# Patient Record
Sex: MALE | Race: BLACK OR AFRICAN AMERICAN
[De-identification: names, ages, dates, MRNs, and addresses within clinical notes are randomized per-mention and may not be internally consistent; named-entity substitution may affect disease eponyms.]

---

## 2017-02-14 ENCOUNTER — HOSPITAL ENCOUNTER (OUTPATIENT)
Dept: HOSPITAL 61 - SURG | Age: 67
Discharge: HOME | End: 2017-02-14
Attending: SURGERY
Payer: MEDICARE

## 2017-02-14 VITALS — WEIGHT: 254 LBS | BODY MASS INDEX: 36.77 KG/M2 | HEIGHT: 69.5 IN

## 2017-02-14 VITALS — SYSTOLIC BLOOD PRESSURE: 174 MMHG | DIASTOLIC BLOOD PRESSURE: 97 MMHG

## 2017-02-14 DIAGNOSIS — I10: ICD-10-CM

## 2017-02-14 DIAGNOSIS — J44.9: ICD-10-CM

## 2017-02-14 DIAGNOSIS — K42.0: ICD-10-CM

## 2017-02-14 DIAGNOSIS — N18.6: Primary | ICD-10-CM

## 2017-02-14 DIAGNOSIS — Z98.42: ICD-10-CM

## 2017-02-14 DIAGNOSIS — J45.909: ICD-10-CM

## 2017-02-14 DIAGNOSIS — E78.00: ICD-10-CM

## 2017-02-14 LAB
ALBUMIN SERPL-MCNC: 3.6 G/DL (ref 3.4–5)
ANION GAP SERPL CALC-SCNC: 11 MMOL/L (ref 6–14)
BASOPHILS # BLD AUTO: 0 X10^3/UL (ref 0–0.2)
BASOPHILS NFR BLD: 1 % (ref 0–3)
BUN SERPL-MCNC: 37 MG/DL (ref 8–26)
CALCIUM SERPL-MCNC: 9.3 MG/DL (ref 8.5–10.1)
CHLORIDE SERPL-SCNC: 103 MMOL/L (ref 98–107)
CO2 SERPL-SCNC: 28 MMOL/L (ref 21–32)
CREAT SERPL-MCNC: 4 MG/DL (ref 0.7–1.3)
EOSINOPHIL NFR BLD: 3 % (ref 0–3)
ERYTHROCYTE [DISTWIDTH] IN BLOOD BY AUTOMATED COUNT: 17.1 % (ref 11.5–14.5)
GFR SERPLBLD BASED ON 1.73 SQ M-ARVRAT: 18.2 ML/MIN
GLUCOSE SERPL-MCNC: 149 MG/DL (ref 70–99)
HCT VFR BLD CALC: 36.2 % (ref 39–53)
HGB BLD-MCNC: 11.7 G/DL (ref 13–17.5)
LYMPHOCYTES # BLD: 1.2 X10^3/UL (ref 1–4.8)
LYMPHOCYTES NFR BLD AUTO: 17 % (ref 24–48)
MCH RBC QN AUTO: 32 PG (ref 25–35)
MCHC RBC AUTO-ENTMCNC: 32 G/DL (ref 31–37)
MCV RBC AUTO: 100 FL (ref 79–100)
MONOCYTES NFR BLD: 9 % (ref 0–9)
NEUTROPHILS NFR BLD AUTO: 70 % (ref 31–73)
PLATELET # BLD AUTO: 181 X10^3/UL (ref 140–400)
POTASSIUM SERPL-SCNC: 4.2 MMOL/L (ref 3.5–5.1)
RBC # BLD AUTO: 3.64 X10^6/UL (ref 4.3–5.7)
SODIUM SERPL-SCNC: 142 MMOL/L (ref 136–145)
WBC # BLD AUTO: 7.1 X10^3/UL (ref 4–11)

## 2017-02-14 PROCEDURE — 85027 COMPLETE CBC AUTOMATED: CPT

## 2017-02-14 PROCEDURE — 49587: CPT

## 2017-02-14 PROCEDURE — 82040 ASSAY OF SERUM ALBUMIN: CPT

## 2017-02-14 PROCEDURE — 82947 ASSAY GLUCOSE BLOOD QUANT: CPT

## 2017-02-14 PROCEDURE — 36415 COLL VENOUS BLD VENIPUNCTURE: CPT

## 2017-02-14 PROCEDURE — 80048 BASIC METABOLIC PNL TOTAL CA: CPT

## 2017-02-14 PROCEDURE — C1769 GUIDE WIRE: HCPCS

## 2017-02-14 PROCEDURE — 49324 LAP INSERT TUNNEL IP CATH: CPT

## 2017-02-14 NOTE — DISCH
DISCHARGE INSTRUCTIONS


Condition on Discharge


Condition on Discharge:  Stable





Activity After Discharge


Activity Instructions for Disc:  Activity as tolerated, Avoid exertion


Lifting Instructions after Dis:  No heavy lifting


Driving Instructions after Dis:  Do not drive today





Diet after Discharge


Diet after Discharge:  Renal Dialysis





Wound Incision Care


Wound/Incision Care:  Ice to area for comfort, Keep wound/cast CDI





Follow-Up


Follow up with:  Gutierrez next week








EDI THOMASON MD Feb 14, 2017 09:58

## 2017-02-14 NOTE — OP
DATE OF SURGERY:  



PREOPERATIVE DIAGNOSIS:  End-stage renal disease on hemodialysis, requesting

peritoneal dialysis catheter incarcerated umbilical hernia.



POSTOPERATIVE DIAGNOSIS:   End-stage renal disease on hemodialysis, requesting

peritoneal dialysis catheter incarcerated umbilical hernia.



PROCEDURE:

1.  Laparoscopic placement of peritoneal dialysis catheter.

2.  Primary repair of incarcerated umbilical hernia.



SURGEON:  Royal Thomason MD



ANESTHESIA:  General endotracheal.



BLOOD LOSS:  5 mL.



IV FLUID:  250.



INDICATIONS:  The patient is a 66-year-old gentleman with end-stage renal

disease on hemodialysis through a temporary catheter who comes for placement of

peritoneal dialysis catheter.



OPERATIVE FINDINGS:  There was incarcerated preperitoneal fat in the umbilical

hernia.  Visual inspection of the remainder of the abdomen failed to reveal

obvious abnormalities.



OPERATIVE REPORT:  The patient brought to the operating suite, given a general

endotracheal anesthetic and the abdomen prepped and draped in usual sterile

fashion.  An epigastric incision was infiltrated with local anesthetic, sharply

incised and a 5 mm Visiport used to gain access into the abdominal cavity. 

Pneumoperitoneum established.  Inspection revealed no evidence of injury to

abdominal contents.  The umbilical hernia was identified and contained

preperitoneal fat.



The template for placement of the catheter had been used before insufflation to

henry at the course of the catheter.  The insertion site was infiltrated with

local anesthetic, sharply incised and under direct vision the needle passed in

the abdominal cavity.  The needle was removed and the dilators were passed

through the sheath and then the catheter was then threaded through the sheath. 

The distal Dacron cuff was seated just above the peritoneum.  The sheath was

then removed.



The remaining catheter was tunneled subcutaneously through the access site

placing the second Dacron cuff in the subcutaneous space away from the access

site incision.  We then turned our attention to the umbilical hernia.



An infraumbilical incision was infiltrated with local anesthetic, sharply

incised and dissection carried down to the anterior sheath.  The hernia was

encircled.  A Penrose drain placed around it and the umbilical skin carefully

freed from the underlying hernia sac and contents.  The contents were then

reduced and the small defect was repaired with interrupted inverted

figure-of-eight 0 Vicryl suture.  A second suture was placed as a second row of

reinforcement and good hemostasis was present.  When a correct sponge count was

obtained, the umbilical skin was tacked to the underlying repair.



The abdomen was then decompressed and the peritoneal dialysis catheter radially

accepted 500 mL of normal saline under a minute.  A similar amount was then

drained.  The catheter was "packed" with heparinized saline.  Skin incisions

closed with subcuticular 4-0 Monocryl.  Steri-Strips and sterile dressings

applied.  The patient was awakened from his anesthetic and taken to the recovery

room in satisfactory condition.

 



______________________________

ROYAL THOMASON MD



DR:  BRUNA/yuniel  JOB#:  [id number] / [id number]

DD:  02/14/2017 09:55  DT:  02/14/2017 11:40



LILA Gee MD

## 2017-02-14 NOTE — PDOC
BRIEF OPERATIVE NOTE


Date:  Feb 14, 2017


Pre-Op Diagnosis


ESRD, incarcerated umbilical hernia


Post-Op Diagnosis


same


Procedure Performed


l/s placement of PD catheter, primary repair of incarcerated umbilical hernia


Surgeon


Gutierrez


Anesthesia Type:  General


Blood Loss


5cc


IV Fluid


250cc


Findings


incarcerated fat in umbilical hernia


Complications


none


Additional Remarks


 # [id number]








EDI THOMASON MD Feb 14, 2017 09:57

## 2017-05-21 ENCOUNTER — HOSPITAL ENCOUNTER (INPATIENT)
Dept: HOSPITAL 61 - ER | Age: 67
LOS: 4 days | Discharge: HOME | DRG: 981 | End: 2017-05-25
Attending: FAMILY MEDICINE | Admitting: FAMILY MEDICINE
Payer: COMMERCIAL

## 2017-05-21 VITALS — SYSTOLIC BLOOD PRESSURE: 107 MMHG | DIASTOLIC BLOOD PRESSURE: 64 MMHG

## 2017-05-21 VITALS — BODY MASS INDEX: 36.68 KG/M2 | WEIGHT: 262 LBS | HEIGHT: 71 IN

## 2017-05-21 VITALS — DIASTOLIC BLOOD PRESSURE: 62 MMHG | SYSTOLIC BLOOD PRESSURE: 95 MMHG

## 2017-05-21 VITALS — SYSTOLIC BLOOD PRESSURE: 138 MMHG | DIASTOLIC BLOOD PRESSURE: 78 MMHG

## 2017-05-21 VITALS — DIASTOLIC BLOOD PRESSURE: 78 MMHG | SYSTOLIC BLOOD PRESSURE: 138 MMHG

## 2017-05-21 DIAGNOSIS — Z90.49: ICD-10-CM

## 2017-05-21 DIAGNOSIS — I25.10: ICD-10-CM

## 2017-05-21 DIAGNOSIS — J44.9: ICD-10-CM

## 2017-05-21 DIAGNOSIS — Z96.619: ICD-10-CM

## 2017-05-21 DIAGNOSIS — Z79.4: ICD-10-CM

## 2017-05-21 DIAGNOSIS — T85.611A: Primary | ICD-10-CM

## 2017-05-21 DIAGNOSIS — Z90.2: ICD-10-CM

## 2017-05-21 DIAGNOSIS — K21.9: ICD-10-CM

## 2017-05-21 DIAGNOSIS — E78.5: ICD-10-CM

## 2017-05-21 DIAGNOSIS — I89.0: ICD-10-CM

## 2017-05-21 DIAGNOSIS — K59.00: ICD-10-CM

## 2017-05-21 DIAGNOSIS — Z99.2: ICD-10-CM

## 2017-05-21 DIAGNOSIS — E11.22: ICD-10-CM

## 2017-05-21 DIAGNOSIS — I50.22: ICD-10-CM

## 2017-05-21 DIAGNOSIS — Z79.891: ICD-10-CM

## 2017-05-21 DIAGNOSIS — Z79.899: ICD-10-CM

## 2017-05-21 DIAGNOSIS — K66.0: ICD-10-CM

## 2017-05-21 DIAGNOSIS — Z79.82: ICD-10-CM

## 2017-05-21 DIAGNOSIS — Z95.1: ICD-10-CM

## 2017-05-21 DIAGNOSIS — I13.2: ICD-10-CM

## 2017-05-21 DIAGNOSIS — N18.6: ICD-10-CM

## 2017-05-21 DIAGNOSIS — Z87.891: ICD-10-CM

## 2017-05-21 DIAGNOSIS — K76.0: ICD-10-CM

## 2017-05-21 DIAGNOSIS — K65.8: ICD-10-CM

## 2017-05-21 DIAGNOSIS — B96.89: ICD-10-CM

## 2017-05-21 DIAGNOSIS — E21.3: ICD-10-CM

## 2017-05-21 DIAGNOSIS — D64.9: ICD-10-CM

## 2017-05-21 LAB
ALBUMIN SERPL-MCNC: 2.9 G/DL (ref 3.4–5)
ALBUMIN/GLOB SERPL: 0.6 {RATIO} (ref 1–1.7)
ALP SERPL-CCNC: 83 U/L (ref 46–116)
ALT SERPL-CCNC: 21 U/L (ref 16–63)
ANION GAP SERPL CALC-SCNC: 10 MMOL/L (ref 6–14)
APTT BLD: 31 SEC (ref 24–38)
AST SERPL-CCNC: 16 U/L (ref 15–37)
BASOPHILS # BLD AUTO: 0.1 X10^3/UL (ref 0–0.2)
BASOPHILS NFR BLD: 1 % (ref 0–3)
BILIRUB SERPL-MCNC: 0.6 MG/DL (ref 0.2–1)
BUN SERPL-MCNC: 63 MG/DL (ref 8–26)
BUN/CREAT SERPL: 10 (ref 6–20)
CALCIUM SERPL-MCNC: 8.8 MG/DL (ref 8.5–10.1)
CHLORIDE SERPL-SCNC: 100 MMOL/L (ref 98–107)
CK SERPL-CCNC: 145 U/L (ref 39–308)
CO2 SERPL-SCNC: 28 MMOL/L (ref 21–32)
CREAT SERPL-MCNC: 6.3 MG/DL (ref 0.7–1.3)
EOSINOPHIL NFR BLD: 1 % (ref 0–3)
ERYTHROCYTE [DISTWIDTH] IN BLOOD BY AUTOMATED COUNT: 15 % (ref 11.5–14.5)
GFR SERPLBLD BASED ON 1.73 SQ M-ARVRAT: 10.8 ML/MIN
GLOBULIN SER-MCNC: 4.9 G/DL (ref 2.2–3.8)
GLUCOSE SERPL-MCNC: 162 MG/DL (ref 70–99)
HCT VFR BLD CALC: 32.8 % (ref 39–53)
HGB BLD-MCNC: 10.8 G/DL (ref 13–17.5)
INR PPP: 1.1 (ref 0.8–1.1)
LYMPHOCYTES # BLD: 1 X10^3/UL (ref 1–4.8)
LYMPHOCYTES NFR BLD AUTO: 10 % (ref 24–48)
MAGNESIUM SERPL-MCNC: 2.2 MG/DL (ref 1.8–2.4)
MCH RBC QN AUTO: 32 PG (ref 25–35)
MCHC RBC AUTO-ENTMCNC: 33 G/DL (ref 31–37)
MCV RBC AUTO: 98 FL (ref 79–100)
MONOCYTES NFR BLD: 8 % (ref 0–9)
NEUTROPHILS NFR BLD AUTO: 81 % (ref 31–73)
PLATELET # BLD AUTO: 189 X10^3/UL (ref 140–400)
POTASSIUM SERPL-SCNC: 4.1 MMOL/L (ref 3.5–5.1)
PROT SERPL-MCNC: 7.8 G/DL (ref 6.4–8.2)
PROTHROMBIN TIME: 13.8 SEC (ref 11.7–14)
RBC # BLD AUTO: 3.34 X10^6/UL (ref 4.3–5.7)
SODIUM SERPL-SCNC: 138 MMOL/L (ref 136–145)
WBC # BLD AUTO: 10.3 X10^3/UL (ref 4–11)

## 2017-05-21 PROCEDURE — 83880 ASSAY OF NATRIURETIC PEPTIDE: CPT

## 2017-05-21 PROCEDURE — 85730 THROMBOPLASTIN TIME PARTIAL: CPT

## 2017-05-21 PROCEDURE — 85610 PROTHROMBIN TIME: CPT

## 2017-05-21 PROCEDURE — 85027 COMPLETE CBC AUTOMATED: CPT

## 2017-05-21 PROCEDURE — 96375 TX/PRO/DX INJ NEW DRUG ADDON: CPT

## 2017-05-21 PROCEDURE — 74176 CT ABD & PELVIS W/O CONTRAST: CPT

## 2017-05-21 PROCEDURE — C1713 ANCHOR/SCREW BN/BN,TIS/BN: HCPCS

## 2017-05-21 PROCEDURE — 77001 FLUOROGUIDE FOR VEIN DEVICE: CPT

## 2017-05-21 PROCEDURE — 80048 BASIC METABOLIC PNL TOTAL CA: CPT

## 2017-05-21 PROCEDURE — A4215 STERILE NEEDLE: HCPCS

## 2017-05-21 PROCEDURE — 96374 THER/PROPH/DIAG INJ IV PUSH: CPT

## 2017-05-21 PROCEDURE — 36415 COLL VENOUS BLD VENIPUNCTURE: CPT

## 2017-05-21 PROCEDURE — C1892 INTRO/SHEATH,FIXED,PEEL-AWAY: HCPCS

## 2017-05-21 PROCEDURE — S0028 INJECTION, FAMOTIDINE, 20 MG: HCPCS

## 2017-05-21 PROCEDURE — 49400 AIR INJECTION INTO ABDOMEN: CPT

## 2017-05-21 PROCEDURE — C1769 GUIDE WIRE: HCPCS

## 2017-05-21 PROCEDURE — C1750 CATH, HEMODIALYSIS,LONG-TERM: HCPCS

## 2017-05-21 PROCEDURE — 74000: CPT

## 2017-05-21 PROCEDURE — 83690 ASSAY OF LIPASE: CPT

## 2017-05-21 PROCEDURE — 83735 ASSAY OF MAGNESIUM: CPT

## 2017-05-21 PROCEDURE — 82947 ASSAY GLUCOSE BLOOD QUANT: CPT

## 2017-05-21 PROCEDURE — 96376 TX/PRO/DX INJ SAME DRUG ADON: CPT

## 2017-05-21 PROCEDURE — 85007 BL SMEAR W/DIFF WBC COUNT: CPT

## 2017-05-21 PROCEDURE — 87641 MR-STAPH DNA AMP PROBE: CPT

## 2017-05-21 PROCEDURE — 36558 INSERT TUNNELED CV CATH: CPT

## 2017-05-21 PROCEDURE — 74190 PERITONEOGRAM RS&I: CPT

## 2017-05-21 PROCEDURE — 76937 US GUIDE VASCULAR ACCESS: CPT

## 2017-05-21 PROCEDURE — 80053 COMPREHEN METABOLIC PANEL: CPT

## 2017-05-21 PROCEDURE — 82550 ASSAY OF CK (CPK): CPT

## 2017-05-21 RX ADMIN — OXYCODONE HYDROCHLORIDE AND ACETAMINOPHEN PRN TAB: 5; 325 TABLET ORAL at 16:42

## 2017-05-21 RX ADMIN — INSULIN ASPART SCH UNITS: 100 INJECTION, SOLUTION INTRAVENOUS; SUBCUTANEOUS at 17:06

## 2017-05-21 NOTE — RAD
Indication: Peritoneal dialysis catheter does not appear to be functioning

adequately. 



Technique:  KUB was obtained. Please see separate KUB report post

injection.



Findings: Peritoneal dialysis catheter is noted in the pelvis. There is a kink

at the pelvic inlet. Bowel gas pattern is nonobstructive. Vascular

calcifications are noted. There are degenerative changes in the spine.



Impression: Peritoneal dialysis catheter is coiled in the pelvis. There is a

mild kink at the pelvic inlet.

## 2017-05-21 NOTE — PHYS DOC
Past Medical History


Past Medical History:  CAD, CHF, COPD, Diabetes-Type II, Hypertension, Renal 

Disease, Renal Failure


Past Surgical History:  Coronary Bypass Surgery, Other


Additional Past Surgical Histo:  R middle and lower lobe lung removal, vein 

graft left thigh, SHUNT RT CHEST


Alcohol Use:  Sober


Drug Use:  None





Adult General


Chief Complaint


Chief Complaint:  ABDOMINAL PAIN





HPI


HPI





Patient is a 66  year old male presenting to the emergency department for 

evaluation of left lower quadrant abdominal pain that has been going on for the 

past several days. He also says that his peritoneal dialysis catheter has not 

been draining as it usually does.  He says that he has gained 7 pounds since 

his peritoneal dialysis stop working.  He was to follow with the dialysis team 

tomorrow however his pain was bothering him too much and he came to the 

emergency department for further evaluation.  Patient says that he has been 

quite constipated but denies any nausea vomiting diarrhea dysuria fevers chills 

shortness of breath or chest pain.





Review of Systems


Review of Systems





Constitutional: Denies fever or chills []


Eyes: Denies change in visual acuity, redness, or eye pain []


HENT: Denies nasal congestion or sore throat []


Respiratory: Denies cough or shortness of breath []


Cardiovascular: No additional information not addressed in HPI []


GI: + abdominal pain.  No nausea, vomiting, bloody stools or diarrhea []


: Denies dysuria or hematuria []


Musculoskeletal: Denies back pain or joint pain []


Integument: Denies rash or skin lesions []


Neurologic: Denies headache, focal weakness or sensory changes []





Current Medications


Current Medications





Current Medications








 Medications


  (Trade)  Dose


 Ordered  Sig/Anna  Start Time


 Stop Time Status Last Admin


Dose Admin


 


 Hydromorphone HCl


  (Dilaudid)  2 mg  STK-MED ONCE  5/21/17 13:14


 5/21/17 13:15 DC  


 


 


 Iohexol


  (Omnipaque 240


 Mg/ml)  50 ml  1X  ONCE  5/21/17 10:00


 5/21/17 10:09 DC 5/21/17 10:00


50 ML


 


 Iohexol


  (Omnipaque 300


 Mg/ml)  50 ml  1X  ONCE  5/21/17 10:00


 5/21/17 10:01 DC 5/21/17 10:15


25 ML


 


 Morphine Sulfate  5 mg  1X  ONCE  5/21/17 11:15


 5/21/17 11:16 DC 5/21/17 11:20


5 MG


 


 Ondansetron HCl


  (Zofran)  4 mg  1X  ONCE  5/21/17 12:00


 5/21/17 12:01 DC 5/21/17 12:02


4 MG











Allergies


Allergies





Allergies








Coded Allergies Type Severity Reaction Last Updated Verified


 


  No Known Drug Allergies    5/21/17 No











Physical Exam


Physical Exam





Constitutional: Well developed, well nourished, no acute distress, non-toxic 

appearance. []


HENT: Normocephalic, atraumatic, bilateral external ears normal, oropharynx 

moist, no oral exudates, nose normal. []


Eyes: PERRLA, EOMI, conjunctiva normal, no discharge. [] 


Neck: Normal range of motion, no tenderness, supple, no stridor. [] 


Cardiovascular:Heart rate regular rhythm, no murmur []


Lungs & Thorax:  Bilateral breath sounds clear to auscultation []


Abdomen: Bowel sounds normal, soft, + LLQ abdominal tenderness, no masses, no 

pulsatile masses. [] 


Skin: Warm, dry, no erythema, no rash. [] 


Back: No tenderness, no CVA tenderness. [] 


Extremities: No tenderness, no cyanosis, no clubbing, ROM intact, +2-3+ edema 

BL. [] 


Neurologic: Alert and oriented X 3, normal motor function, normal sensory 

function, no focal deficits noted. []





Current Patient Data


Vital Signs





 Vital Signs








  Date Time  Temp Pulse Resp B/P (MAP) Pulse Ox O2 Delivery O2 Flow Rate FiO2


 


5/21/17 13:19   20  95 Room Air  


 


5/21/17 09:16 98.8 79  110/63 (79)    





 98.8       








Lab Values





 Laboratory Tests








Test


  5/21/17


11:00


 


White Blood Count


  10.3 x10^3/uL


(4.0-11.0)


 


Red Blood Count


  3.34 x10^6/uL


(4.30-5.70)  L


 


Hemoglobin


  10.8 g/dL


(13.0-17.5)  L


 


Hematocrit


  32.8 %


(39.0-53.0)  L


 


Mean Corpuscular Volume


  98 fL ()


 


 


Mean Corpuscular Hemoglobin 32 pg (25-35)  


 


Mean Corpuscular Hemoglobin


Concent 33 g/dL


(31-37)


 


Red Cell Distribution Width


  15.0 %


(11.5-14.5)  H


 


Platelet Count


  189 x10^3/uL


(140-400)


 


Neutrophils (%) (Auto) 81 % (31-73)  H


 


Lymphocytes (%) (Auto) 10 % (24-48)  L


 


Monocytes (%) (Auto) 8 % (0-9)  


 


Eosinophils (%) (Auto) 1 % (0-3)  


 


Basophils (%) (Auto) 1 % (0-3)  


 


Neutrophils # (Auto)


  8.3 x10^3uL


(1.8-7.7)  H


 


Lymphocytes # (Auto)


  1.0 x10^3/uL


(1.0-4.8)


 


Monocytes # (Auto)


  0.8 x10^3/uL


(0.0-1.1)


 


Eosinophils # (Auto)


  0.1 x10^3/uL


(0.0-0.7)


 


Basophils # (Auto)


  0.1 x10^3/uL


(0.0-0.2)


 


Prothrombin Time


  13.8 SEC


(11.7-14.0)


 


Prothrombin Time INR 1.1 (0.8-1.1)  


 


PTT


  31 SEC (24-38)


 


 


Sodium Level


  138 mmol/L


(136-145)


 


Potassium Level


  4.1 mmol/L


(3.5-5.1)


 


Chloride Level


  100 mmol/L


()


 


Carbon Dioxide Level


  28 mmol/L


(21-32)


 


Anion Gap 10 (6-14)  


 


Blood Urea Nitrogen


  63 mg/dL


(8-26)  H


 


Creatinine


  6.3 mg/dL


(0.7-1.3)  H


 


Estimated GFR


(Cockcroft-Gault) 10.8  


 


 


BUN/Creatinine Ratio 10 (6-20)  


 


Glucose Level


  162 mg/dL


(70-99)  H


 


Calcium Level


  8.8 mg/dL


(8.5-10.1)


 


Magnesium Level


  2.2 mg/dL


(1.8-2.4)


 


Total Bilirubin


  0.6 mg/dL


(0.2-1.0)


 


Aspartate Amino Transferase


(AST) 16 U/L (15-37)


 


 


Alanine Aminotransferase (ALT)


  21 U/L (16-63)


 


 


Alkaline Phosphatase


  83 U/L


()


 


Creatine Kinase


  145 U/L


()


 


NT-Pro-B-Type Natriuretic


Peptide 1181 pg/mL


(0-124)  H


 


Total Protein


  7.8 g/dL


(6.4-8.2)


 


Albumin


  2.9 g/dL


(3.4-5.0)  L


 


Albumin/Globulin Ratio


  0.6 (1.0-1.7)


L


 


Lipase


  79 U/L


()





 Laboratory Tests


5/21/17 11:00








 Laboratory Tests


5/21/17 11:00











EKG


EKG


[]





Radiology/Procedures


Radiology/Procedures


Indication: Dialysis catheter does not appear to be functioning properly.





Technique: KUB was obtained and repeated to center over the pelvis after


injection of approximately 25 mL of iodinated contrast into the catheter


either by the radiology technologist or the ER clinician.





Findings: Contrast spills freely into the pelvis, catheter is patent. There


again is a mild kink where the catheter enters the pelvis.





Impression: Peritoneal dialysis catheter is patent.














DICTATED and SIGNED BY:     ELLIE KAYE MD


DATE:     05/21/17 1029





Indication: Left lower quadrant pain.





Technique: Axial images and coronal and sagittal reformatted images are


provided. Oral contrast was administered. The patient also had recent


injection of contrast into the peritoneal dialysis catheter. Recent KUBs along


with a CT from February 20, 2012 were reviewed in comparison.





One or more of the following individualized dose reduction techniques were


utilized for this examination:


1. Automated exposure control


2. Adjustment of the mA and/or kV according to patient size


3. Use of iterative reconstruction technique





Findings: There is linear atelectasis or scarring in the left lung base. There


is no pleural effusion. The heart is not enlarged. Coronary artery


calcifications are noted. Calcified granulomas are noted.





Solid organ evaluation is limited without contrast. There is mild fatty


infiltration of the liver. Gallbladder is unremarkable. Spleen, pancreas, and


adrenals are unremarkable. Probable cyst is noted in the right kidney


measuring 21 mm. There is atheromatous disease in the abdominal aorta without


aneurysm. Contrast in the esophagus may be related to reflux. There is no


small bowel obstruction or mural thickening. Moderate amount of stool is noted


in the colon. There are a few diverticula in the colon. Small amount of


ascites may be related to the dialysis catheter. Appendix is not definitely


visualized. Evaluation is limited. There is fluid in the paracolic gutter on


the right although again this could be related to the dialysis. Patient's


symptoms are on the left.





Peritoneal dialysis catheter is in position. Bladder is unremarkable. Prostate


is not enlarged. There are degenerative changes in the spine.





Impression:


1. No acute abdominal findings.


2. Fatty infiltration of the liver.


3. Peritoneal dialysis catheter.














DICTATED and SIGNED BY:     ELLIE KAYE MD


DATE:     05/21/17 5928





Course & Med Decision Making


Course & Med Decision Making


Catheter appears to be patent on the KUB. We will check labs CT his abdomen and 

pelvis to check for diverticulitis and then reassess.





Patient continues to have intractable abdominal pain in the emergency 

department.  Workup is largely negative but given his intractable pain and his 

failure of outpatient dialysis he will be admitted for further observation and 

treatment.  Patient admitted in stable condition.





Dragon Disclaimer


Dragon Disclaimer


This electronic medical record was generated, in whole or in part, using a 

voice recognition dictation system.





Departure


Departure


Impression:  


 Primary Impression:  


 Abdominal pain


 Additional Impressions:  


 ESRD (end stage renal disease) on dialysis


 Peripheral edema


 Fluid overload


Disposition:  09 ADMITTED AS INPATIENT


Admitting Physician:  Valentina Nuno


Condition:  IMPROVED


Referrals:  


VALENTINA NUNO MD (PCP)





Problem Qualifiers








 Primary Impression:  


 Abdominal pain


 Abdominal location:  left lower quadrant  Qualified Codes:  R10.32 - Left 

lower quadrant pain








MERISSA FAJARDO DO May 21, 2017 09:14

## 2017-05-21 NOTE — RAD
Indication: Left lower quadrant pain.



Technique: Axial images and coronal and sagittal reformatted images are

provided. Oral contrast was administered. The patient also had recent

injection of contrast into the peritoneal dialysis catheter. Recent KUBs along

with a CT from February 20, 2012 were reviewed in comparison.



One or more of the following individualized dose reduction techniques were

utilized for this examination:

1. Automated exposure control

2. Adjustment of the mA and/or kV according to patient size

3. Use of iterative reconstruction technique



Findings: There is linear atelectasis or scarring in the left lung base. There

is no pleural effusion. The heart is not enlarged. Coronary artery

calcifications are noted. Calcified granulomas are noted.



Solid organ evaluation is limited without contrast. There is mild fatty

infiltration of the liver. Gallbladder is unremarkable. Spleen, pancreas, and

adrenals are unremarkable. Probable cyst is noted in the right kidney

measuring 21 mm. There is atheromatous disease in the abdominal aorta without

aneurysm. Contrast in the esophagus may be related to reflux. There is no

small bowel obstruction or mural thickening. Moderate amount of stool is noted

in the colon. There are a few diverticula in the colon. Small amount of

ascites may be related to the dialysis catheter. Appendix is not definitely

visualized. Evaluation is limited. There is fluid in the paracolic gutter on

the right although again this could be related to the dialysis. Patient's

symptoms are on the left.



Peritoneal dialysis catheter is in position. Bladder is unremarkable. Prostate

is not enlarged. There are degenerative changes in the spine.



Impression:

1. No acute abdominal findings.

2. Fatty infiltration of the liver.

3. Peritoneal dialysis catheter.

## 2017-05-21 NOTE — RAD
Indication: Dialysis catheter does not appear to be functioning properly.



Technique: KUB was obtained and repeated to center over the pelvis after

injection of approximately 25 mL of iodinated contrast into the catheter

either by the radiology technologist or the ER clinician.



Findings: Contrast spills freely into the pelvis, catheter is patent. There

again is a mild kink where the catheter enters the pelvis.



Impression: Peritoneal dialysis catheter is patent.

## 2017-05-22 VITALS — DIASTOLIC BLOOD PRESSURE: 60 MMHG | SYSTOLIC BLOOD PRESSURE: 99 MMHG

## 2017-05-22 VITALS — SYSTOLIC BLOOD PRESSURE: 106 MMHG | DIASTOLIC BLOOD PRESSURE: 56 MMHG

## 2017-05-22 VITALS — DIASTOLIC BLOOD PRESSURE: 57 MMHG | SYSTOLIC BLOOD PRESSURE: 102 MMHG

## 2017-05-22 VITALS — DIASTOLIC BLOOD PRESSURE: 56 MMHG | SYSTOLIC BLOOD PRESSURE: 105 MMHG

## 2017-05-22 VITALS — DIASTOLIC BLOOD PRESSURE: 65 MMHG | SYSTOLIC BLOOD PRESSURE: 111 MMHG

## 2017-05-22 VITALS — SYSTOLIC BLOOD PRESSURE: 104 MMHG | DIASTOLIC BLOOD PRESSURE: 55 MMHG

## 2017-05-22 LAB
ANION GAP SERPL CALC-SCNC: 13 MMOL/L (ref 6–14)
BASOPHILS # BLD AUTO: 0 X10^3/UL (ref 0–0.2)
BASOPHILS NFR BLD: 0 % (ref 0–3)
BUN SERPL-MCNC: 67 MG/DL (ref 8–26)
CALCIUM SERPL-MCNC: 8.3 MG/DL (ref 8.5–10.1)
CHLORIDE SERPL-SCNC: 99 MMOL/L (ref 98–107)
CO2 SERPL-SCNC: 27 MMOL/L (ref 21–32)
CREAT SERPL-MCNC: 6.6 MG/DL (ref 0.7–1.3)
EOSINOPHIL NFR BLD AUTO: 1 % (ref 0–5)
EOSINOPHIL NFR BLD: 0 % (ref 0–3)
ERYTHROCYTE [DISTWIDTH] IN BLOOD BY AUTOMATED COUNT: 15.6 % (ref 11.5–14.5)
GFR SERPLBLD BASED ON 1.73 SQ M-ARVRAT: 10.2 ML/MIN
GLUCOSE SERPL-MCNC: 135 MG/DL (ref 70–99)
HCT VFR BLD CALC: 34 % (ref 39–53)
HGB BLD-MCNC: 11 G/DL (ref 13–17.5)
LYMPHOCYTES # BLD: 1.1 X10^3/UL (ref 1–4.8)
LYMPHOCYTES NFR BLD AUTO: 7 % (ref 24–48)
MCH RBC QN AUTO: 32 PG (ref 25–35)
MCHC RBC AUTO-ENTMCNC: 32 G/DL (ref 31–37)
MCV RBC AUTO: 99 FL (ref 79–100)
MONOCYTES NFR BLD: 4 % (ref 0–9)
NEUTROPHILS NFR BLD AUTO: 89 % (ref 31–73)
PLATELET # BLD AUTO: 222 X10^3/UL (ref 140–400)
PLATELET # BLD EST: ADEQUATE 10*3/UL
POTASSIUM SERPL-SCNC: 4.2 MMOL/L (ref 3.5–5.1)
RBC # BLD AUTO: 3.44 X10^6/UL (ref 4.3–5.7)
SODIUM SERPL-SCNC: 139 MMOL/L (ref 136–145)
WBC # BLD AUTO: 17.6 X10^3/UL (ref 4–11)

## 2017-05-22 RX ADMIN — OXYCODONE HYDROCHLORIDE AND ACETAMINOPHEN PRN TAB: 5; 325 TABLET ORAL at 03:30

## 2017-05-22 RX ADMIN — INSULIN ASPART SCH UNITS: 100 INJECTION, SOLUTION INTRAVENOUS; SUBCUTANEOUS at 17:00

## 2017-05-22 RX ADMIN — SEVELAMER CARBONATE SCH MG: 800 TABLET, FILM COATED ORAL at 12:41

## 2017-05-22 RX ADMIN — FUROSEMIDE SCH MG: 80 TABLET ORAL at 15:49

## 2017-05-22 RX ADMIN — FUROSEMIDE SCH MG: 80 TABLET ORAL at 10:07

## 2017-05-22 RX ADMIN — CALCITRIOL SCH MCG: 0.25 CAPSULE, LIQUID FILLED ORAL at 10:07

## 2017-05-22 RX ADMIN — POLYETHYLENE GLYCOL 3350 SCH GM: 17 POWDER, FOR SOLUTION ORAL at 08:11

## 2017-05-22 RX ADMIN — Medication SCH TAB: at 10:07

## 2017-05-22 RX ADMIN — OXYCODONE HYDROCHLORIDE AND ACETAMINOPHEN PRN TAB: 5; 325 TABLET ORAL at 10:56

## 2017-05-22 RX ADMIN — PIPERACILLIN SODIUM AND TAZOBACTAM SODIUM SCH MLS/HR: 2; .25 INJECTION, POWDER, LYOPHILIZED, FOR SOLUTION INTRAVENOUS at 20:27

## 2017-05-22 RX ADMIN — SEVELAMER CARBONATE SCH MG: 800 TABLET, FILM COATED ORAL at 10:07

## 2017-05-22 RX ADMIN — PIPERACILLIN SODIUM AND TAZOBACTAM SODIUM SCH MLS/HR: 2; .25 INJECTION, POWDER, LYOPHILIZED, FOR SOLUTION INTRAVENOUS at 10:00

## 2017-05-22 RX ADMIN — SIMVASTATIN SCH MG: 10 TABLET, FILM COATED ORAL at 20:27

## 2017-05-22 RX ADMIN — PIPERACILLIN SODIUM AND TAZOBACTAM SODIUM SCH MLS/HR: 2; .25 INJECTION, POWDER, LYOPHILIZED, FOR SOLUTION INTRAVENOUS at 17:02

## 2017-05-22 RX ADMIN — INSULIN ASPART SCH UNITS: 100 INJECTION, SOLUTION INTRAVENOUS; SUBCUTANEOUS at 08:00

## 2017-05-22 RX ADMIN — INSULIN ASPART SCH UNITS: 100 INJECTION, SOLUTION INTRAVENOUS; SUBCUTANEOUS at 12:00

## 2017-05-22 RX ADMIN — MORPHINE SULFATE PRN MG: 2 INJECTION, SOLUTION INTRAMUSCULAR; INTRAVENOUS at 15:49

## 2017-05-22 RX ADMIN — SEVELAMER CARBONATE SCH MG: 800 TABLET, FILM COATED ORAL at 17:02

## 2017-05-22 RX ADMIN — OXYCODONE HYDROCHLORIDE AND ACETAMINOPHEN PRN TAB: 5; 325 TABLET ORAL at 18:21

## 2017-05-22 RX ADMIN — MORPHINE SULFATE PRN MG: 4 INJECTION, SOLUTION INTRAMUSCULAR; INTRAVENOUS at 20:25

## 2017-05-22 RX ADMIN — PIPERACILLIN SODIUM AND TAZOBACTAM SODIUM SCH MLS/HR: 2; .25 INJECTION, POWDER, LYOPHILIZED, FOR SOLUTION INTRAVENOUS at 16:00

## 2017-05-22 NOTE — PDOC
Infectious Disease Note


Vital Sign


Vital Signs





Vital Signs








  Date Time  Temp Pulse Resp B/P (MAP) Pulse Ox O2 Delivery O2 Flow Rate FiO2


 


5/22/17 07:15     95 Room Air 2.0 


 


5/22/17 07:00 97.7 81 20 102/57 (72)    





 97.7       











Labs


Lab





Laboratory Tests








Test


  5/21/17


11:00 5/21/17


16:27 5/21/17


21:28 5/22/17


03:22


 


White Blood Count


  10.3 x10^3/uL


(4.0-11.0) 


  


  


 


 


Red Blood Count


  3.34 x10^6/uL


(4.30-5.70) 


  


  


 


 


Hemoglobin


  10.8 g/dL


(13.0-17.5) 


  


  


 


 


Hematocrit


  32.8 %


(39.0-53.0) 


  


  


 


 


Mean Corpuscular Volume 98 fL ()    


 


Mean Corpuscular Hemoglobin 32 pg (25-35)    


 


Mean Corpuscular Hemoglobin


Concent 33 g/dL


(31-37) 


  


  


 


 


Red Cell Distribution Width


  15.0 %


(11.5-14.5) 


  


  


 


 


Platelet Count


  189 x10^3/uL


(140-400) 


  


  


 


 


Neutrophils (%) (Auto) 81 % (31-73)    


 


Lymphocytes (%) (Auto) 10 % (24-48)    


 


Monocytes (%) (Auto) 8 % (0-9)    


 


Eosinophils (%) (Auto) 1 % (0-3)    


 


Basophils (%) (Auto) 1 % (0-3)    


 


Neutrophils # (Auto)


  8.3 x10^3uL


(1.8-7.7) 


  


  


 


 


Lymphocytes # (Auto)


  1.0 x10^3/uL


(1.0-4.8) 


  


  


 


 


Monocytes # (Auto)


  0.8 x10^3/uL


(0.0-1.1) 


  


  


 


 


Eosinophils # (Auto)


  0.1 x10^3/uL


(0.0-0.7) 


  


  


 


 


Basophils # (Auto)


  0.1 x10^3/uL


(0.0-0.2) 


  


  


 


 


Prothrombin Time


  13.8 SEC


(11.7-14.0) 


  


  


 


 


Prothromb Time International


Ratio 1.1 (0.8-1.1) 


  


  


  


 


 


Activated Partial


Thromboplast Time 31 SEC (24-38) 


  


  


  


 


 


Sodium Level


  138 mmol/L


(136-145) 


  


  139 mmol/L


(136-145)


 


Potassium Level


  4.1 mmol/L


(3.5-5.1) 


  


  4.2 mmol/L


(3.5-5.1)


 


Chloride Level


  100 mmol/L


() 


  


  99 mmol/L


()


 


Carbon Dioxide Level


  28 mmol/L


(21-32) 


  


  27 mmol/L


(21-32)


 


Anion Gap 10 (6-14)    13 (6-14) 


 


Blood Urea Nitrogen


  63 mg/dL


(8-26) 


  


  67 mg/dL


(8-26)


 


Creatinine


  6.3 mg/dL


(0.7-1.3) 


  


  6.6 mg/dL


(0.7-1.3)


 


Estimated GFR


(Cockcroft-Gault) 10.8 


  


  


  10.2 


 


 


BUN/Creatinine Ratio 10 (6-20)    


 


Glucose Level


  162 mg/dL


(70-99) 


  


  135 mg/dL


(70-99)


 


Calcium Level


  8.8 mg/dL


(8.5-10.1) 


  


  8.3 mg/dL


(8.5-10.1)


 


Magnesium Level


  2.2 mg/dL


(1.8-2.4) 


  


  


 


 


Total Bilirubin


  0.6 mg/dL


(0.2-1.0) 


  


  


 


 


Aspartate Amino Transf


(AST/SGOT) 16 U/L (15-37) 


  


  


  


 


 


Alanine Aminotransferase


(ALT/SGPT) 21 U/L (16-63) 


  


  


  


 


 


Alkaline Phosphatase


  83 U/L


() 


  


  


 


 


Creatine Kinase


  145 U/L


() 


  


  


 


 


NT-Pro-B-Type Natriuretic


Peptide 1181 pg/mL


(0-124) 


  


  


 


 


Total Protein


  7.8 g/dL


(6.4-8.2) 


  


  


 


 


Albumin


  2.9 g/dL


(3.4-5.0) 


  


  


 


 


Albumin/Globulin Ratio 0.6 (1.0-1.7)    


 


Lipase


  79 U/L


() 


  


  


 


 


Glucose (Fingerstick)


  


  114 mg/dL


(70-99) 144 mg/dL


(70-99) 


 


 


Test


  5/22/17


03:38 5/22/17


07:40 


  


 


 


White Blood Count


  17.6 x10^3/uL


(4.0-11.0) 


  


  


 


 


Red Blood Count


  3.44 x10^6/uL


(4.30-5.70) 


  


  


 


 


Hemoglobin


  11.0 g/dL


(13.0-17.5) 


  


  


 


 


Hematocrit


  34.0 %


(39.0-53.0) 


  


  


 


 


Mean Corpuscular Volume 99 fL ()    


 


Mean Corpuscular Hemoglobin 32 pg (25-35)    


 


Mean Corpuscular Hemoglobin


Concent 32 g/dL


(31-37) 


  


  


 


 


Red Cell Distribution Width


  15.6 %


(11.5-14.5) 


  


  


 


 


Platelet Count


  222 x10^3/uL


(140-400) 


  


  


 


 


Neutrophils (%) (Auto) 89 % (31-73)    


 


Lymphocytes (%) (Auto) 7 % (24-48)    


 


Monocytes (%) (Auto) 4 % (0-9)    


 


Eosinophils (%) (Auto) 0 % (0-3)    


 


Basophils (%) (Auto) 0 % (0-3)    


 


Neutrophils # (Auto)


  15.7 x10^3uL


(1.8-7.7) 


  


  


 


 


Lymphocytes # (Auto)


  1.1 x10^3/uL


(1.0-4.8) 


  


  


 


 


Monocytes # (Auto)


  0.8 x10^3/uL


(0.0-1.1) 


  


  


 


 


Eosinophils # (Auto)


  0.0 x10^3/uL


(0.0-0.7) 


  


  


 


 


Basophils # (Auto)


  0.0 x10^3/uL


(0.0-0.2) 


  


  


 


 


Segmented Neutrophils % 88 % (35-66)    


 


Band Neutrophils % 1 % (0-9)    


 


Lymphocytes % 8 % (24-48)    


 


Monocytes % 2 % (0-10)    


 


Eosinophils % 1 % (0-5)    


 


Platelet Estimate


  Adequate


(ADEQUATE) 


  


  


 


 


Glucose (Fingerstick)


  


  131 mg/dL


(70-99) 


  


 











Objective


Assessment


Abdominal pain


PD cath mal function vs infection/peritonitis


ESRD


DM


Chronic lymphedema


CHF





Plan


Plan of Care


PD fluid for cell count and culture


d/w Dr Whitley


once fluid taken then start zosyn


check culture


d/w SALOME Fisher MD May 22, 2017 09:37

## 2017-05-22 NOTE — CONS
DATE OF CONSULTATION:  05/22/2017



REQUESTING PHYSICIAN:  Dr. Valentina Delaney.



REASON FOR CONSULTATION:  Abdominal pain, possible peritonitis.



HISTORY OF PRESENT ILLNESS:  This is a 66-year-old -American gentleman

with history of congestive heart failure, end-stage renal disease, on peritoneal

dialysis for last 3 months or so, who came in with unable to do peritoneal

dialysis.  Last 4 days, he has not been able to get any fluid out and he is

having abdominal pain.  The patient also has been complaining of some

constipation.  The patient denies any fever, chills, nausea, vomiting, diarrhea.

 Denies any chest pain, headache, visual symptoms or urinary symptoms.



PAST MEDICAL HISTORY:  Positive for coronary artery disease, congestive heart

failure, COPD, diabetes, hypertension, end-stage renal disease on peritoneal

dialysis, has had coronary artery bypass surgery done as well as bilateral lower

extremity lymphedema.



SOCIAL HISTORY:  Negative for smoking, alcohol, illicit drug use.



ALLERGIES:  No known drug allergies.



CURRENT MEDICATIONS:  Reviewed.  The patient is not on any antibiotics.



REVIEW OF SYSTEMS:  As per HPI, all other systems reviewed are negative.



PHYSICAL EXAMINATION:

GENERAL:  Alert, oriented gentleman, not in distress.

VITAL SIGNS:  Stable, afebrile.

HEENT:  NAD.

NECK:  Supple, no JVP, no lymphadenopathy.

LUNGS:  Clear.

HEART:  S1, S2 regular.

ABDOMEN:  Not much distended, it is not very minimal or mild diffuse tenderness

present.  No rebound or guarding.  PD catheter site is unremarkable.

EXTREMITIES:  Bilateral chronic lymphedema, no signs of infection.

NEUROLOGIC:  The patient is neurologically intact.



LABORATORY DATA:  White count yesterday was 10,000, today is 17,000.  BUN 67,

creatinine 6.6.  PD fluid has not been obtained yet.



CT scan of the abdomen and pelvis was unremarkable.



IMPRESSION:

1.  Abdominal pain whether it is a mechanical failure of PD catheter versus PD

catheter associated peritonitis, needs to be ruled out.

2.  End-stage renal disease, on peritoneal dialysis.

3.  Leukocytosis.

4.  Diabetes.

5.  Coronary artery disease.

6.  Congestive heart failure.



RECOMMENDATIONS:  Would get PD fluid first for cell count, diff and culture and

then start Zosyn, supportive care.  Discussion with Dr. Whitley done and

consulted him and discussion with Dr. Delaney done.



Thank you very much, Dr. Delaney for giving me the opportunity to participate in

this patient's care.

 



______________________________

SALOME CRUZ MD



DR:  SULLY/yuniel  JOB#:  [id number] / [id number]

DD:  05/22/2017 09:43  DT:  05/22/2017 12:12

## 2017-05-22 NOTE — PDOC2
CONSULT


Date of Consult


Date of Consult


DATE: 5/22/17 


TIME: 11:28





Reason for Consult


Reason for Consult:


ESRD





Referring Physician


Referring Physician:


YAAKOV





Identification/Chief Complaint


Chief Complaint


ABD PAIN





Source


Source:  Chart review, Patient





History of Present Illness


Reason for Visit:


THIS IS A 66 YR OLD ESRD PT ON PD. HE HAS BEEN DOING WELL TILL THIS WEEKEND 

WHEN HE NOTICED PAIN WHILE ON HIS CYCLER AT NIGHT FOR DIALYSIS. HE HAS NOT HAD 

ANY FEVERS OR CHILLS. DUE TO AN INABILITY TO DO HIS PD AND ABD PAIN HE CAME TO 

THE ER AND THEN WAS ADMITTED TO THE HOSPITAL. PD CATHETER WAS EVALUATED AND 

THERE WAS NO OBSTRUCTION NOTED. HE IS NOTED TO HAVE LEUCOCYTOSIS. NO DIARRHEA. 

HAD SOME CONSTIPATION BUT THIS IS APPARENTLY RESOLVED





Past Medical History


Cardiovascular:  CAD, CHF, HTN, Hyperlipidemia, Other


Pulmonary:  Asthma, COPD


CENTRAL NERVOUS SYSTEM:  Other


GI:  GERD


Heme/Onc:  No pertinent hx


Hepatobiliary:  No pertinent hx


Psych:  No pertinent hx


Rheumatologic:  No pertinent hx


Infectious disease:  No pertinent hx


Renal/:  Chronic renal failure, Benign prostatic enlarg.


Endocrine:  Diabetes, Hyperparathyroidism





Past Surgical History


Past Surgical History:  Appendectomy, CABG, Other





Family History


Family History:  Diabetes, Hypertension, Family History Unknown





Social History


ALCOHOL:  none


Drugs:  None


Lives:  with Family


Domestic Violence:  Neg





Current Problem List


Problem List


Problems


Medical Problems:


(1) Abdominal pain


Status: Acute  





(2) ESRD (end stage renal disease) on dialysis


Status: Acute  





(3) Fluid overload


Status: Acute  





(4) Peripheral edema


Status: Acute  











Current Medications


Current Medications





Current Medications


Iohexol (Omnipaque 300 Mg/ml) 50 ml 1X  ONCE IJ  Last administered on 5/21/17at 

10:15;  Start 5/21/17 at 10:00;  Stop 5/21/17 at 10:01;  Status DC


Iohexol (Omnipaque 240 Mg/ml) 50 ml 1X  ONCE PO  Last administered on 5/21/17at 

10:00;  Start 5/21/17 at 10:00;  Stop 5/21/17 at 10:09;  Status DC


Ondansetron HCl (Zofran) 4 mg 1X  ONCE IV  Last administered on 5/21/17at 11:20

;  Start 5/21/17 at 11:15;  Stop 5/21/17 at 11:16;  Status DC


Morphine Sulfate 10 mg STK-MED ONCE .ROUTE ;  Start 5/21/17 at 11:08;  Stop 5/21 /17 at 11:09;  Status DC


Morphine Sulfate 5 mg 1X  ONCE IV  Last administered on 5/21/17at 11:20;  Start 

5/21/17 at 11:15;  Stop 5/21/17 at 11:16;  Status DC


Ondansetron HCl (Zofran) 4 mg 1X  ONCE IV  Last administered on 5/21/17at 12:02

;  Start 5/21/17 at 12:00;  Stop 5/21/17 at 12:01;  Status DC


Hydromorphone HCl (Dilaudid) 2 mg STK-MED ONCE .ROUTE ;  Start 5/21/17 at 13:14

;  Stop 5/21/17 at 13:15;  Status DC


Hydromorphone HCl (Dilaudid) 1 mg 1X  ONCE IV  Last administered on 5/21/17at 13

:19;  Start 5/21/17 at 13:30;  Stop 5/21/17 at 13:31;  Status DC


Ondansetron HCl (Zofran) 4 mg PRN Q8HRS  PRN IV NAUSEA/VOMITING;  Start 5/21/17 

at 14:30;  Stop 5/22/17 at 14:29


Fentanyl Citrate (Fentanyl 2ml Vial) 50 mcg PRN Q1HR  PRN IV PAIN;  Start 5/21/ 17 at 14:30;  Stop 5/22/17 at 14:29


Insulin Aspart (NovoLOG) 0-7 UNITS TIDWMEALS SQ ;  Start 5/21/17 at 17:00


Dextrose (Dextrose 50%-Water Syringe) 12.5 gm PRN Q15MIN  PRN IV SEE COMMENTS;  

Start 5/21/17 at 16:00


Oxycodone/ Acetaminophen (Percocet 5/325) 1 tab PRN Q6HRS  PRN PO PAIN Last 

administered on 5/22/17at 10:56;  Start 5/21/17 at 16:00


Polyethylene Glycol (miraLAX PACKET) 17 gm DAILY PO  Last administered on 5/22/ 17at 08:11;  Start 5/22/17 at 09:00


Bisacodyl (Dulcolax Tab) 10 mg PRN DAILY  PRN PO CONSTIPATION;  Start 5/21/17 

at 21:00


Vitamin B Complex/ Vitamin C (Winsome-Samir) 1 tab DAILY PO  Last administered on 5/ 22/17at 10:07;  Start 5/22/17 at 09:00


Furosemide (Lasix) 80 mg BID94 PO  Last administered on 5/22/17at 10:07;  Start 

5/22/17 at 09:00


Sevelamer Carbonate (Renvela) 400 mg TIDWMEALS PO  Last administered on 5/22/ 17at 10:07;  Start 5/22/17 at 09:00


Simvastatin (Zocor) 10 mg QHS PO ;  Start 5/22/17 at 21:00


Calcitriol (Rocaltrol) 0.5 mcg DAILY PO  Last administered on 5/22/17at 10:07;  

Start 5/22/17 at 09:00


Piperacillin Sod/ Tazobactam Sod 2.25 gm/Sodium Chloride 50 ml @  100 mls/hr 

Q8HRS IV ;  Start 5/22/17 at 10:00


Info (PHARMACY MONITORING -- do not chart) 4 each Q6HRS MC ;  Start 5/22/17 at 

12:00;  Status Cancel


Peritoneal Dialysis Solution 2,000 ml @  500 mls/hr Q4HRS IP ;  Start 5/22/17 

at 12:00





Active Scripts


Active


Calcitriol 0.5 Mcg Capsule 1 Cap PO DAILY


Reported


Nephro-Samir Tablet (Folic Acid/Vitamin B Comp W-C) 0.8 Mg Tablet 1 Tab PO DAILY


Lasix (Furosemide) 80 Mg Tablet 1 Tab PO BID


Simvastatin 10 Mg Tablet 1 Tab PO QHS


Renvela (Sevelamer Carbonate) 800 Mg Tablet 0.5 Tab PO TID


Percocet 5-325 Mg Tablet (Oxycodone/Acetaminophen) 1 Each Tablet 1 Tab PO Q4HRS 

PRN


     dose taken __________,  next dose may be taken at __________





Allergies


Allergies:  


Coded Allergies:  


     No Known Drug Allergies (Unverified , 5/21/17)





ROS


General:  YES: Fatigue, Malaise, Appetite


PSYCHOLOGICAL ROS:  YES: Anxiety


Eyes:  Yes Decreased vision


HEENT:  YES: Heacaches


Gastrointestinal:  Yes Abdominal Pain, Yes Constipation


Genitourinary:  YES Other (OLIGURIA)


Musculoskeletal:  Yes Muscular Weakness


Neurological:  Yes Weakness


Skin:  Yes Dry Skin





Physical Exam


General:  Alert, Oriented X3, Cooperative, No acute distress


HEENT:  Atraumatic, PERRLA, EOMI


Lungs:  Clear to auscultation


Heart:  Regular rate, Normal S1, Normal S2, No murmurs


Abdomen:  Normal bowel sounds, Soft, No hepatosplenomegaly, No masses, Other (

PD CATH IS CLEAN AND DRY. TENDER TO DEEP PALPATION BUT NO REBOUND PAIN NOTED)


Neuro:  Normal gait, Normal speech, Cranial nerves 3-12 NL


Psych/Mental Status:  Mental status NL, Mood NL


MUSCULOSKELETAL:  No deformity, No swelling





Vitals


VITALS





Vital Signs








  Date Time  Temp Pulse Resp B/P (MAP) Pulse Ox O2 Delivery O2 Flow Rate FiO2


 


5/22/17 10:56     96 Room Air 2.0 


 


5/22/17 10:54 97.8 93 20 104/55 (71)    





 97.8       











Labs


Labs





Laboratory Tests








Test


  5/21/17


11:00 5/21/17


16:27 5/21/17


21:28 5/22/17


03:22


 


White Blood Count


  10.3 x10^3/uL


(4.0-11.0) 


  


  


 


 


Red Blood Count


  3.34 x10^6/uL


(4.30-5.70) 


  


  


 


 


Hemoglobin


  10.8 g/dL


(13.0-17.5) 


  


  


 


 


Hematocrit


  32.8 %


(39.0-53.0) 


  


  


 


 


Mean Corpuscular Volume 98 fL ()    


 


Mean Corpuscular Hemoglobin 32 pg (25-35)    


 


Mean Corpuscular Hemoglobin


Concent 33 g/dL


(31-37) 


  


  


 


 


Red Cell Distribution Width


  15.0 %


(11.5-14.5) 


  


  


 


 


Platelet Count


  189 x10^3/uL


(140-400) 


  


  


 


 


Neutrophils (%) (Auto) 81 % (31-73)    


 


Lymphocytes (%) (Auto) 10 % (24-48)    


 


Monocytes (%) (Auto) 8 % (0-9)    


 


Eosinophils (%) (Auto) 1 % (0-3)    


 


Basophils (%) (Auto) 1 % (0-3)    


 


Neutrophils # (Auto)


  8.3 x10^3uL


(1.8-7.7) 


  


  


 


 


Lymphocytes # (Auto)


  1.0 x10^3/uL


(1.0-4.8) 


  


  


 


 


Monocytes # (Auto)


  0.8 x10^3/uL


(0.0-1.1) 


  


  


 


 


Eosinophils # (Auto)


  0.1 x10^3/uL


(0.0-0.7) 


  


  


 


 


Basophils # (Auto)


  0.1 x10^3/uL


(0.0-0.2) 


  


  


 


 


Prothrombin Time


  13.8 SEC


(11.7-14.0) 


  


  


 


 


Prothromb Time International


Ratio 1.1 (0.8-1.1) 


  


  


  


 


 


Activated Partial


Thromboplast Time 31 SEC (24-38) 


  


  


  


 


 


Sodium Level


  138 mmol/L


(136-145) 


  


  139 mmol/L


(136-145)


 


Potassium Level


  4.1 mmol/L


(3.5-5.1) 


  


  4.2 mmol/L


(3.5-5.1)


 


Chloride Level


  100 mmol/L


() 


  


  99 mmol/L


()


 


Carbon Dioxide Level


  28 mmol/L


(21-32) 


  


  27 mmol/L


(21-32)


 


Anion Gap 10 (6-14)    13 (6-14) 


 


Blood Urea Nitrogen


  63 mg/dL


(8-26) 


  


  67 mg/dL


(8-26)


 


Creatinine


  6.3 mg/dL


(0.7-1.3) 


  


  6.6 mg/dL


(0.7-1.3)


 


Estimated GFR


(Cockcroft-Gault) 10.8 


  


  


  10.2 


 


 


BUN/Creatinine Ratio 10 (6-20)    


 


Glucose Level


  162 mg/dL


(70-99) 


  


  135 mg/dL


(70-99)


 


Calcium Level


  8.8 mg/dL


(8.5-10.1) 


  


  8.3 mg/dL


(8.5-10.1)


 


Magnesium Level


  2.2 mg/dL


(1.8-2.4) 


  


  


 


 


Total Bilirubin


  0.6 mg/dL


(0.2-1.0) 


  


  


 


 


Aspartate Amino Transf


(AST/SGOT) 16 U/L (15-37) 


  


  


  


 


 


Alanine Aminotransferase


(ALT/SGPT) 21 U/L (16-63) 


  


  


  


 


 


Alkaline Phosphatase


  83 U/L


() 


  


  


 


 


Creatine Kinase


  145 U/L


() 


  


  


 


 


NT-Pro-B-Type Natriuretic


Peptide 1181 pg/mL


(0-124) 


  


  


 


 


Total Protein


  7.8 g/dL


(6.4-8.2) 


  


  


 


 


Albumin


  2.9 g/dL


(3.4-5.0) 


  


  


 


 


Albumin/Globulin Ratio 0.6 (1.0-1.7)    


 


Lipase


  79 U/L


() 


  


  


 


 


Glucose (Fingerstick)


  


  114 mg/dL


(70-99) 144 mg/dL


(70-99) 


 


 


Test


  5/22/17


03:38 5/22/17


07:40 5/22/17


10:33 


 


 


White Blood Count


  17.6 x10^3/uL


(4.0-11.0) 


  


  


 


 


Red Blood Count


  3.44 x10^6/uL


(4.30-5.70) 


  


  


 


 


Hemoglobin


  11.0 g/dL


(13.0-17.5) 


  


  


 


 


Hematocrit


  34.0 %


(39.0-53.0) 


  


  


 


 


Mean Corpuscular Volume 99 fL ()    


 


Mean Corpuscular Hemoglobin 32 pg (25-35)    


 


Mean Corpuscular Hemoglobin


Concent 32 g/dL


(31-37) 


  


  


 


 


Red Cell Distribution Width


  15.6 %


(11.5-14.5) 


  


  


 


 


Platelet Count


  222 x10^3/uL


(140-400) 


  


  


 


 


Neutrophils (%) (Auto) 89 % (31-73)    


 


Lymphocytes (%) (Auto) 7 % (24-48)    


 


Monocytes (%) (Auto) 4 % (0-9)    


 


Eosinophils (%) (Auto) 0 % (0-3)    


 


Basophils (%) (Auto) 0 % (0-3)    


 


Neutrophils # (Auto)


  15.7 x10^3uL


(1.8-7.7) 


  


  


 


 


Lymphocytes # (Auto)


  1.1 x10^3/uL


(1.0-4.8) 


  


  


 


 


Monocytes # (Auto)


  0.8 x10^3/uL


(0.0-1.1) 


  


  


 


 


Eosinophils # (Auto)


  0.0 x10^3/uL


(0.0-0.7) 


  


  


 


 


Basophils # (Auto)


  0.0 x10^3/uL


(0.0-0.2) 


  


  


 


 


Segmented Neutrophils % 88 % (35-66)    


 


Band Neutrophils % 1 % (0-9)    


 


Lymphocytes % 8 % (24-48)    


 


Monocytes % 2 % (0-10)    


 


Eosinophils % 1 % (0-5)    


 


Platelet Estimate


  Adequate


(ADEQUATE) 


  


  


 


 


Glucose (Fingerstick)


  


  131 mg/dL


(70-99) 156 mg/dL


(70-99) 


 








Laboratory Tests








Test


  5/21/17


16:27 5/21/17


21:28 5/22/17


03:22 5/22/17


03:38


 


Glucose (Fingerstick)


  114 mg/dL


(70-99) 144 mg/dL


(70-99) 


  


 


 


Sodium Level


  


  


  139 mmol/L


(136-145) 


 


 


Potassium Level


  


  


  4.2 mmol/L


(3.5-5.1) 


 


 


Chloride Level


  


  


  99 mmol/L


() 


 


 


Carbon Dioxide Level


  


  


  27 mmol/L


(21-32) 


 


 


Anion Gap   13 (6-14)  


 


Blood Urea Nitrogen


  


  


  67 mg/dL


(8-26) 


 


 


Creatinine


  


  


  6.6 mg/dL


(0.7-1.3) 


 


 


Estimated GFR


(Cockcroft-Gault) 


  


  10.2 


  


 


 


Glucose Level


  


  


  135 mg/dL


(70-99) 


 


 


Calcium Level


  


  


  8.3 mg/dL


(8.5-10.1) 


 


 


White Blood Count


  


  


  


  17.6 x10^3/uL


(4.0-11.0)


 


Red Blood Count


  


  


  


  3.44 x10^6/uL


(4.30-5.70)


 


Hemoglobin


  


  


  


  11.0 g/dL


(13.0-17.5)


 


Hematocrit


  


  


  


  34.0 %


(39.0-53.0)


 


Mean Corpuscular Volume    99 fL () 


 


Mean Corpuscular Hemoglobin    32 pg (25-35) 


 


Mean Corpuscular Hemoglobin


Concent 


  


  


  32 g/dL


(31-37)


 


Red Cell Distribution Width


  


  


  


  15.6 %


(11.5-14.5)


 


Platelet Count


  


  


  


  222 x10^3/uL


(140-400)


 


Neutrophils (%) (Auto)    89 % (31-73) 


 


Lymphocytes (%) (Auto)    7 % (24-48) 


 


Monocytes (%) (Auto)    4 % (0-9) 


 


Eosinophils (%) (Auto)    0 % (0-3) 


 


Basophils (%) (Auto)    0 % (0-3) 


 


Neutrophils # (Auto)


  


  


  


  15.7 x10^3uL


(1.8-7.7)


 


Lymphocytes # (Auto)


  


  


  


  1.1 x10^3/uL


(1.0-4.8)


 


Monocytes # (Auto)


  


  


  


  0.8 x10^3/uL


(0.0-1.1)


 


Eosinophils # (Auto)


  


  


  


  0.0 x10^3/uL


(0.0-0.7)


 


Basophils # (Auto)


  


  


  


  0.0 x10^3/uL


(0.0-0.2)


 


Segmented Neutrophils %    88 % (35-66) 


 


Band Neutrophils %    1 % (0-9) 


 


Lymphocytes %    8 % (24-48) 


 


Monocytes %    2 % (0-10) 


 


Eosinophils %    1 % (0-5) 


 


Platelet Estimate


  


  


  


  Adequate


(ADEQUATE)


 


Test


  5/22/17


07:40 5/22/17


10:33 


  


 


 


Glucose (Fingerstick)


  131 mg/dL


(70-99) 156 mg/dL


(70-99) 


  


 











Assessment/Plan


Assessment/Plan


IMP





ABD PAIN


PERIPHERAL LEUCOCYTOSIS


ANEMIA


ESRD-ON PD


DM II


HTN





PD CATHETER EVALUATED YESTERDAY SHOWED NO OBSTRUCTION





PLAN





PD FLUID CELL COUNT AND DIFF


ARANESP WHEN NEEDED


ANTIBIOTICS PER ID


RESUME PD - WILL DO CAPD


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


WILL DO EXCHANGES Q 6 HRS











LILA GONZALEZ MD May 22, 2017 11:33

## 2017-05-22 NOTE — PDOC2
CONSULT


Date of Consult


Date of Consult


DATE: 5/22/17 


TIME: 12:53





Reason for Consult


Reason for Consult:


pd cath dysfunction





Referring Physician


Referring Physician:


Dr Crook





Identification/Chief Complaint


Chief Complaint


abdominal pain





Source


Source:  Chart review, Patient





History of Present Illness


Reason for Visit:


PD cath placed in February by Dr Whitley, it has been functioning until 

Wednesday when nothing would flow and developed severe abdominal pain/cramping


denies n/v, no constipation 


tolerating his diet





Past Medical History


Cardiovascular:  CAD, CHF, HTN, Hyperlipidemia, Other


Pulmonary:  Asthma, COPD


CENTRAL NERVOUS SYSTEM:  Other


GI:  GERD


Heme/Onc:  No pertinent hx


Hepatobiliary:  No pertinent hx


Psych:  No pertinent hx


Rheumatologic:  No pertinent hx


Infectious disease:  No pertinent hx


Renal/:  Chronic renal failure, Benign prostatic enlarg.


Endocrine:  Diabetes, Hyperparathyroidism





Past Surgical History


Past Surgical History:  Appendectomy, CABG, Other (PD cath)





Family History


Family History:  Diabetes, Hypertension, Family History Unknown





Social History


ALCOHOL:  none


Drugs:  None


Lives:  with Family


Domestic Violence:  Neg





Current Problem List


Problem List


Problems


Medical Problems:


(1) Abdominal pain


Status: Acute  





(2) ESRD (end stage renal disease) on dialysis


Status: Acute  





(3) Fluid overload


Status: Acute  





(4) Peripheral edema


Status: Acute  











Current Medications


Current Medications





Current Medications


Iohexol (Omnipaque 300 Mg/ml) 50 ml 1X  ONCE IJ  Last administered on 5/21/17at 

10:15;  Start 5/21/17 at 10:00;  Stop 5/21/17 at 10:01;  Status DC


Iohexol (Omnipaque 240 Mg/ml) 50 ml 1X  ONCE PO  Last administered on 5/21/17at 

10:00;  Start 5/21/17 at 10:00;  Stop 5/21/17 at 10:09;  Status DC


Ondansetron HCl (Zofran) 4 mg 1X  ONCE IV  Last administered on 5/21/17at 11:20

;  Start 5/21/17 at 11:15;  Stop 5/21/17 at 11:16;  Status DC


Morphine Sulfate 10 mg STK-MED ONCE .ROUTE ;  Start 5/21/17 at 11:08;  Stop 5/21 /17 at 11:09;  Status DC


Morphine Sulfate 5 mg 1X  ONCE IV  Last administered on 5/21/17at 11:20;  Start 

5/21/17 at 11:15;  Stop 5/21/17 at 11:16;  Status DC


Ondansetron HCl (Zofran) 4 mg 1X  ONCE IV  Last administered on 5/21/17at 12:02

;  Start 5/21/17 at 12:00;  Stop 5/21/17 at 12:01;  Status DC


Hydromorphone HCl (Dilaudid) 2 mg STK-MED ONCE .ROUTE ;  Start 5/21/17 at 13:14

;  Stop 5/21/17 at 13:15;  Status DC


Hydromorphone HCl (Dilaudid) 1 mg 1X  ONCE IV  Last administered on 5/21/17at 13

:19;  Start 5/21/17 at 13:30;  Stop 5/21/17 at 13:31;  Status DC


Ondansetron HCl (Zofran) 4 mg PRN Q8HRS  PRN IV NAUSEA/VOMITING;  Start 5/21/17 

at 14:30;  Stop 5/22/17 at 14:29


Fentanyl Citrate (Fentanyl 2ml Vial) 50 mcg PRN Q1HR  PRN IV PAIN;  Start 5/21/ 17 at 14:30;  Stop 5/22/17 at 14:29


Insulin Aspart (NovoLOG) 0-7 UNITS TIDWMEALS SQ ;  Start 5/21/17 at 17:00


Dextrose (Dextrose 50%-Water Syringe) 12.5 gm PRN Q15MIN  PRN IV SEE COMMENTS;  

Start 5/21/17 at 16:00


Oxycodone/ Acetaminophen (Percocet 5/325) 1 tab PRN Q6HRS  PRN PO PAIN Last 

administered on 5/22/17at 10:56;  Start 5/21/17 at 16:00


Polyethylene Glycol (miraLAX PACKET) 17 gm DAILY PO  Last administered on 5/22/ 17at 08:11;  Start 5/22/17 at 09:00


Bisacodyl (Dulcolax Tab) 10 mg PRN DAILY  PRN PO CONSTIPATION;  Start 5/21/17 

at 21:00


Vitamin B Complex/ Vitamin C (Winsome-Samir) 1 tab DAILY PO  Last administered on 5/ 22/17at 10:07;  Start 5/22/17 at 09:00


Furosemide (Lasix) 80 mg BID94 PO  Last administered on 5/22/17at 10:07;  Start 

5/22/17 at 09:00


Sevelamer Carbonate (Renvela) 400 mg TIDWMEALS PO  Last administered on 5/22/ 17at 12:41;  Start 5/22/17 at 09:00


Simvastatin (Zocor) 10 mg QHS PO ;  Start 5/22/17 at 21:00


Calcitriol (Rocaltrol) 0.5 mcg DAILY PO  Last administered on 5/22/17at 10:07;  

Start 5/22/17 at 09:00


Piperacillin Sod/ Tazobactam Sod 2.25 gm/Sodium Chloride 50 ml @  100 mls/hr 

Q8HRS IV ;  Start 5/22/17 at 10:00


Info (PHARMACY MONITORING -- do not chart) 4 each Q6HRS MC ;  Start 5/22/17 at 

12:00;  Status Cancel


Peritoneal Dialysis Solution 2,000 ml @  500 mls/hr Q4HRS IP  Last administered 

on 5/22/17at 12:41;  Start 5/22/17 at 12:00





Active Scripts


Active


Calcitriol 0.5 Mcg Capsule 1 Cap PO DAILY


Reported


Nephro-Samir Tablet (Folic Acid/Vitamin B Comp W-C) 0.8 Mg Tablet 1 Tab PO DAILY


Lasix (Furosemide) 80 Mg Tablet 1 Tab PO BID


Simvastatin 10 Mg Tablet 1 Tab PO QHS


Renvela (Sevelamer Carbonate) 800 Mg Tablet 0.5 Tab PO TID


Percocet 5-325 Mg Tablet (Oxycodone/Acetaminophen) 1 Each Tablet 1 Tab PO Q4HRS 

PRN


     dose taken __________,  next dose may be taken at __________





Allergies


Allergies:  


Coded Allergies:  


     No Known Drug Allergies (Unverified , 5/21/17)





ROS


General:  YES: Chills, 


   No: Other (fevers)


PSYCHOLOGICAL ROS:  No: Anxiety, Depression


Eyes:  No Blurry vision, No Double vision


HEENT:  No: Heacaches, Sore Throat


Hematological and Lymphatic:  No: Bleeding Problems, Blood Clots


Respiratory:  No: Cough, Shortness of breath


Cardiovascular:  No Chest Pain, No Palpitations


Gastrointestinal:  Yes Other (see hpi)


Genitourinary:  YES Dysuria, 


   No Hematuria


Musculoskeletal:  No Joint Pain, No Muscle Pain


Neurological:  No Memory Loss, No Numbness/Tingling


Skin:  No Pruritus, No Rash





Physical Exam


General:  Alert, Oriented X3, Cooperative, No acute distress


HEENT:  PERRLA, Mucous membr. moist/pink


Lungs:  Clear to auscultation, Normal air movement


Heart:  Regular rate, Normal S1, Normal S2, No murmurs


Abdomen:  Soft, Other (moderated distention, nontender, PD cath in place)


Extremities:  No clubbing, No cyanosis


Skin:  No rashes, No breakdown


Neuro:  Normal gait, Normal speech


Psych/Mental Status:  Mental status NL, Mood NL


MUSCULOSKELETAL:  No deformity, No swelling





Vitals


VITALS





Vital Signs








  Date Time  Temp Pulse Resp B/P (MAP) Pulse Ox O2 Delivery O2 Flow Rate FiO2


 


5/22/17 10:56     96 Room Air 2.0 


 


5/22/17 10:54 97.8 93 20 104/55 (71)    





 97.8       











Labs


Labs





Laboratory Tests








Test


  5/21/17


11:00 5/21/17


16:27 5/21/17


21:28 5/22/17


03:22


 


White Blood Count


  10.3 x10^3/uL


(4.0-11.0) 


  


  


 


 


Red Blood Count


  3.34 x10^6/uL


(4.30-5.70) 


  


  


 


 


Hemoglobin


  10.8 g/dL


(13.0-17.5) 


  


  


 


 


Hematocrit


  32.8 %


(39.0-53.0) 


  


  


 


 


Mean Corpuscular Volume 98 fL ()    


 


Mean Corpuscular Hemoglobin 32 pg (25-35)    


 


Mean Corpuscular Hemoglobin


Concent 33 g/dL


(31-37) 


  


  


 


 


Red Cell Distribution Width


  15.0 %


(11.5-14.5) 


  


  


 


 


Platelet Count


  189 x10^3/uL


(140-400) 


  


  


 


 


Neutrophils (%) (Auto) 81 % (31-73)    


 


Lymphocytes (%) (Auto) 10 % (24-48)    


 


Monocytes (%) (Auto) 8 % (0-9)    


 


Eosinophils (%) (Auto) 1 % (0-3)    


 


Basophils (%) (Auto) 1 % (0-3)    


 


Neutrophils # (Auto)


  8.3 x10^3uL


(1.8-7.7) 


  


  


 


 


Lymphocytes # (Auto)


  1.0 x10^3/uL


(1.0-4.8) 


  


  


 


 


Monocytes # (Auto)


  0.8 x10^3/uL


(0.0-1.1) 


  


  


 


 


Eosinophils # (Auto)


  0.1 x10^3/uL


(0.0-0.7) 


  


  


 


 


Basophils # (Auto)


  0.1 x10^3/uL


(0.0-0.2) 


  


  


 


 


Prothrombin Time


  13.8 SEC


(11.7-14.0) 


  


  


 


 


Prothromb Time International


Ratio 1.1 (0.8-1.1) 


  


  


  


 


 


Activated Partial


Thromboplast Time 31 SEC (24-38) 


  


  


  


 


 


Sodium Level


  138 mmol/L


(136-145) 


  


  139 mmol/L


(136-145)


 


Potassium Level


  4.1 mmol/L


(3.5-5.1) 


  


  4.2 mmol/L


(3.5-5.1)


 


Chloride Level


  100 mmol/L


() 


  


  99 mmol/L


()


 


Carbon Dioxide Level


  28 mmol/L


(21-32) 


  


  27 mmol/L


(21-32)


 


Anion Gap 10 (6-14)    13 (6-14) 


 


Blood Urea Nitrogen


  63 mg/dL


(8-26) 


  


  67 mg/dL


(8-26)


 


Creatinine


  6.3 mg/dL


(0.7-1.3) 


  


  6.6 mg/dL


(0.7-1.3)


 


Estimated GFR


(Cockcroft-Gault) 10.8 


  


  


  10.2 


 


 


BUN/Creatinine Ratio 10 (6-20)    


 


Glucose Level


  162 mg/dL


(70-99) 


  


  135 mg/dL


(70-99)


 


Calcium Level


  8.8 mg/dL


(8.5-10.1) 


  


  8.3 mg/dL


(8.5-10.1)


 


Magnesium Level


  2.2 mg/dL


(1.8-2.4) 


  


  


 


 


Total Bilirubin


  0.6 mg/dL


(0.2-1.0) 


  


  


 


 


Aspartate Amino Transf


(AST/SGOT) 16 U/L (15-37) 


  


  


  


 


 


Alanine Aminotransferase


(ALT/SGPT) 21 U/L (16-63) 


  


  


  


 


 


Alkaline Phosphatase


  83 U/L


() 


  


  


 


 


Creatine Kinase


  145 U/L


() 


  


  


 


 


NT-Pro-B-Type Natriuretic


Peptide 1181 pg/mL


(0-124) 


  


  


 


 


Total Protein


  7.8 g/dL


(6.4-8.2) 


  


  


 


 


Albumin


  2.9 g/dL


(3.4-5.0) 


  


  


 


 


Albumin/Globulin Ratio 0.6 (1.0-1.7)    


 


Lipase


  79 U/L


() 


  


  


 


 


Glucose (Fingerstick)


  


  114 mg/dL


(70-99) 144 mg/dL


(70-99) 


 


 


Test


  5/22/17


03:38 5/22/17


07:40 5/22/17


10:33 


 


 


White Blood Count


  17.6 x10^3/uL


(4.0-11.0) 


  


  


 


 


Red Blood Count


  3.44 x10^6/uL


(4.30-5.70) 


  


  


 


 


Hemoglobin


  11.0 g/dL


(13.0-17.5) 


  


  


 


 


Hematocrit


  34.0 %


(39.0-53.0) 


  


  


 


 


Mean Corpuscular Volume 99 fL ()    


 


Mean Corpuscular Hemoglobin 32 pg (25-35)    


 


Mean Corpuscular Hemoglobin


Concent 32 g/dL


(31-37) 


  


  


 


 


Red Cell Distribution Width


  15.6 %


(11.5-14.5) 


  


  


 


 


Platelet Count


  222 x10^3/uL


(140-400) 


  


  


 


 


Neutrophils (%) (Auto) 89 % (31-73)    


 


Lymphocytes (%) (Auto) 7 % (24-48)    


 


Monocytes (%) (Auto) 4 % (0-9)    


 


Eosinophils (%) (Auto) 0 % (0-3)    


 


Basophils (%) (Auto) 0 % (0-3)    


 


Neutrophils # (Auto)


  15.7 x10^3uL


(1.8-7.7) 


  


  


 


 


Lymphocytes # (Auto)


  1.1 x10^3/uL


(1.0-4.8) 


  


  


 


 


Monocytes # (Auto)


  0.8 x10^3/uL


(0.0-1.1) 


  


  


 


 


Eosinophils # (Auto)


  0.0 x10^3/uL


(0.0-0.7) 


  


  


 


 


Basophils # (Auto)


  0.0 x10^3/uL


(0.0-0.2) 


  


  


 


 


Segmented Neutrophils % 88 % (35-66)    


 


Band Neutrophils % 1 % (0-9)    


 


Lymphocytes % 8 % (24-48)    


 


Monocytes % 2 % (0-10)    


 


Eosinophils % 1 % (0-5)    


 


Platelet Estimate


  Adequate


(ADEQUATE) 


  


  


 


 


Glucose (Fingerstick)


  


  131 mg/dL


(70-99) 156 mg/dL


(70-99) 


 








Laboratory Tests








Test


  5/21/17


16:27 5/21/17


21:28 5/22/17


03:22 5/22/17


03:38


 


Glucose (Fingerstick)


  114 mg/dL


(70-99) 144 mg/dL


(70-99) 


  


 


 


Sodium Level


  


  


  139 mmol/L


(136-145) 


 


 


Potassium Level


  


  


  4.2 mmol/L


(3.5-5.1) 


 


 


Chloride Level


  


  


  99 mmol/L


() 


 


 


Carbon Dioxide Level


  


  


  27 mmol/L


(21-32) 


 


 


Anion Gap   13 (6-14)  


 


Blood Urea Nitrogen


  


  


  67 mg/dL


(8-26) 


 


 


Creatinine


  


  


  6.6 mg/dL


(0.7-1.3) 


 


 


Estimated GFR


(Cockcroft-Gault) 


  


  10.2 


  


 


 


Glucose Level


  


  


  135 mg/dL


(70-99) 


 


 


Calcium Level


  


  


  8.3 mg/dL


(8.5-10.1) 


 


 


White Blood Count


  


  


  


  17.6 x10^3/uL


(4.0-11.0)


 


Red Blood Count


  


  


  


  3.44 x10^6/uL


(4.30-5.70)


 


Hemoglobin


  


  


  


  11.0 g/dL


(13.0-17.5)


 


Hematocrit


  


  


  


  34.0 %


(39.0-53.0)


 


Mean Corpuscular Volume    99 fL () 


 


Mean Corpuscular Hemoglobin    32 pg (25-35) 


 


Mean Corpuscular Hemoglobin


Concent 


  


  


  32 g/dL


(31-37)


 


Red Cell Distribution Width


  


  


  


  15.6 %


(11.5-14.5)


 


Platelet Count


  


  


  


  222 x10^3/uL


(140-400)


 


Neutrophils (%) (Auto)    89 % (31-73) 


 


Lymphocytes (%) (Auto)    7 % (24-48) 


 


Monocytes (%) (Auto)    4 % (0-9) 


 


Eosinophils (%) (Auto)    0 % (0-3) 


 


Basophils (%) (Auto)    0 % (0-3) 


 


Neutrophils # (Auto)


  


  


  


  15.7 x10^3uL


(1.8-7.7)


 


Lymphocytes # (Auto)


  


  


  


  1.1 x10^3/uL


(1.0-4.8)


 


Monocytes # (Auto)


  


  


  


  0.8 x10^3/uL


(0.0-1.1)


 


Eosinophils # (Auto)


  


  


  


  0.0 x10^3/uL


(0.0-0.7)


 


Basophils # (Auto)


  


  


  


  0.0 x10^3/uL


(0.0-0.2)


 


Segmented Neutrophils %    88 % (35-66) 


 


Band Neutrophils %    1 % (0-9) 


 


Lymphocytes %    8 % (24-48) 


 


Monocytes %    2 % (0-10) 


 


Eosinophils %    1 % (0-5) 


 


Platelet Estimate


  


  


  


  Adequate


(ADEQUATE)


 


Test


  5/22/17


07:40 5/22/17


10:33 


  


 


 


Glucose (Fingerstick)


  131 mg/dL


(70-99) 156 mg/dL


(70-99) 


  


 











Assessment/Plan


Assessment/Plan


PD cath malfunction 


renal failure, requiring dialysis


leukocytosis


abd pain





will consult IR to assess PD cath











TORIN VALDEZ May 22, 2017 13:05

## 2017-05-22 NOTE — PDOC
Exam








Chris





Assistant


Assistant


JOSE DANIEL Bermudez





Pre-Procedure Diagnosis


Pre-Procedure Diagnosis


Nonfunctioning PDC.


Severe LLQ pain.





Post-Procedure Diagnosis


Post-Procedure Diagnosis


Unable to restore PDC function---Attempted injection of 2 cc contrast material 

met with high resistance, and produced acute exacerbation of LLQ pain, with 

radiation to left testicle.





Procedure Performed


Procedure Performed


PDC check





Type of Anesthesia


Type of Anesthesia


None





Estimated Blood Loss


EBL:


None





Condition of Patient


Condition of Patient


No change.  Persistent, severe  LLQ pain.





Disposition


Disposition


From IR return to [facility].  


Unable to restore PDC function---do not attempt to use PDC.


General surgeon notified.


Full report to follow.











ROSA BAUTISTA MD May 22, 2017 15:23

## 2017-05-22 NOTE — PDOC
PROGRESS NOTES


Subjective


Subjective


Patient reports some L LQ abdominal pain persists, not as bad as at admission.





Objective


Objective





Vital Signs








  Date Time  Temp Pulse Resp B/P (MAP) Pulse Ox O2 Delivery O2 Flow Rate FiO2


 


5/22/17 07:15     95 Room Air 2.0 


 


5/22/17 07:00 97.7 81 20 102/57 (72)    





 97.7       














Intake and Output 


 


 5/22/17





 06:59


 


Intake Total 120 ml


 


Output Total 925 ml


 


Balance -805 ml


 


 


 


Intake Oral 120 ml


 


Output Urine Total 925 ml


 


# Voids 3











Physical Exam


Abdomen:  Normal bowel sounds, Soft, Other (mild TTP L LQ and diffusely, no 

guarding or rebound, no erythema around dialysis catheter site)


Heart:  Regular rate


Extremities:  Other (chronic severe lymphedema bilateral LE's)


General:  Alert, Oriented X3, No acute distress


Lungs:  Other (BS mildly decreased throughout but otherwise CTA)





Assessment


Assessment


Problems


Medical Problems:


(1) Abdominal pain


Status: Acute  





(2) ESRD (end stage renal disease) on dialysis


Status: Acute  





(3) Fluid overload


Status: Acute  





(4) Peripheral edema


Status: Acute  











Plan


Plan of Care


1. ESRD - patient unable to do his usual peritoneal dialysis at home for 

several days as machine indicated line was clogged and wouldn't draw. KUB at 

admission showed contrast flowed through catheter without difficulty but 

partial kink in tubing seen. Awaiting Renal input.


2. abdominal pain - this is unusual for patient. Not constipated as he has been 

taking Miralax at home every day as advised. WBC's increased today raising 

possibility of SBP. CT at admission showed no evidence of localized infection. 

Consulting ID.


3. Hx mild systolic CHF - appears stable, continue patient's usual Lasix BID.


4. DM2 - this has mainly been diet-controlled for him. Continue ADA diet and SS 

insulin.


5. chronic lymphedema - patient unfortunately at his baseline. Knows to elevate 

his legs as much as possible.





Comment


Review of Relevant


I have reviewed the following items henry (where applicable) has been applied.


Labs





Laboratory Tests








Test


  5/21/17


11:00 5/21/17


16:27 5/21/17


21:28 5/22/17


03:22


 


White Blood Count


  10.3 x10^3/uL


(4.0-11.0) 


  


  


 


 


Red Blood Count


  3.34 x10^6/uL


(4.30-5.70) 


  


  


 


 


Hemoglobin


  10.8 g/dL


(13.0-17.5) 


  


  


 


 


Hematocrit


  32.8 %


(39.0-53.0) 


  


  


 


 


Mean Corpuscular Volume 98 fL ()    


 


Mean Corpuscular Hemoglobin 32 pg (25-35)    


 


Mean Corpuscular Hemoglobin


Concent 33 g/dL


(31-37) 


  


  


 


 


Red Cell Distribution Width


  15.0 %


(11.5-14.5) 


  


  


 


 


Platelet Count


  189 x10^3/uL


(140-400) 


  


  


 


 


Neutrophils (%) (Auto) 81 % (31-73)    


 


Lymphocytes (%) (Auto) 10 % (24-48)    


 


Monocytes (%) (Auto) 8 % (0-9)    


 


Eosinophils (%) (Auto) 1 % (0-3)    


 


Basophils (%) (Auto) 1 % (0-3)    


 


Neutrophils # (Auto)


  8.3 x10^3uL


(1.8-7.7) 


  


  


 


 


Lymphocytes # (Auto)


  1.0 x10^3/uL


(1.0-4.8) 


  


  


 


 


Monocytes # (Auto)


  0.8 x10^3/uL


(0.0-1.1) 


  


  


 


 


Eosinophils # (Auto)


  0.1 x10^3/uL


(0.0-0.7) 


  


  


 


 


Basophils # (Auto)


  0.1 x10^3/uL


(0.0-0.2) 


  


  


 


 


Prothrombin Time


  13.8 SEC


(11.7-14.0) 


  


  


 


 


Prothromb Time International


Ratio 1.1 (0.8-1.1) 


  


  


  


 


 


Activated Partial


Thromboplast Time 31 SEC (24-38) 


  


  


  


 


 


Sodium Level


  138 mmol/L


(136-145) 


  


  139 mmol/L


(136-145)


 


Potassium Level


  4.1 mmol/L


(3.5-5.1) 


  


  4.2 mmol/L


(3.5-5.1)


 


Chloride Level


  100 mmol/L


() 


  


  99 mmol/L


()


 


Carbon Dioxide Level


  28 mmol/L


(21-32) 


  


  27 mmol/L


(21-32)


 


Anion Gap 10 (6-14)    13 (6-14) 


 


Blood Urea Nitrogen


  63 mg/dL


(8-26) 


  


  67 mg/dL


(8-26)


 


Creatinine


  6.3 mg/dL


(0.7-1.3) 


  


  6.6 mg/dL


(0.7-1.3)


 


Estimated GFR


(Cockcroft-Gault) 10.8 


  


  


  10.2 


 


 


BUN/Creatinine Ratio 10 (6-20)    


 


Glucose Level


  162 mg/dL


(70-99) 


  


  135 mg/dL


(70-99)


 


Calcium Level


  8.8 mg/dL


(8.5-10.1) 


  


  8.3 mg/dL


(8.5-10.1)


 


Magnesium Level


  2.2 mg/dL


(1.8-2.4) 


  


  


 


 


Total Bilirubin


  0.6 mg/dL


(0.2-1.0) 


  


  


 


 


Aspartate Amino Transf


(AST/SGOT) 16 U/L (15-37) 


  


  


  


 


 


Alanine Aminotransferase


(ALT/SGPT) 21 U/L (16-63) 


  


  


  


 


 


Alkaline Phosphatase


  83 U/L


() 


  


  


 


 


Creatine Kinase


  145 U/L


() 


  


  


 


 


NT-Pro-B-Type Natriuretic


Peptide 1181 pg/mL


(0-124) 


  


  


 


 


Total Protein


  7.8 g/dL


(6.4-8.2) 


  


  


 


 


Albumin


  2.9 g/dL


(3.4-5.0) 


  


  


 


 


Albumin/Globulin Ratio 0.6 (1.0-1.7)    


 


Lipase


  79 U/L


() 


  


  


 


 


Glucose (Fingerstick)


  


  114 mg/dL


(70-99) 144 mg/dL


(70-99) 


 


 


Test


  5/22/17


03:38 5/22/17


07:40 


  


 


 


White Blood Count


  17.6 x10^3/uL


(4.0-11.0) 


  


  


 


 


Red Blood Count


  3.44 x10^6/uL


(4.30-5.70) 


  


  


 


 


Hemoglobin


  11.0 g/dL


(13.0-17.5) 


  


  


 


 


Hematocrit


  34.0 %


(39.0-53.0) 


  


  


 


 


Mean Corpuscular Volume 99 fL ()    


 


Mean Corpuscular Hemoglobin 32 pg (25-35)    


 


Mean Corpuscular Hemoglobin


Concent 32 g/dL


(31-37) 


  


  


 


 


Red Cell Distribution Width


  15.6 %


(11.5-14.5) 


  


  


 


 


Platelet Count


  222 x10^3/uL


(140-400) 


  


  


 


 


Neutrophils (%) (Auto) 89 % (31-73)    


 


Lymphocytes (%) (Auto) 7 % (24-48)    


 


Monocytes (%) (Auto) 4 % (0-9)    


 


Eosinophils (%) (Auto) 0 % (0-3)    


 


Basophils (%) (Auto) 0 % (0-3)    


 


Neutrophils # (Auto)


  15.7 x10^3uL


(1.8-7.7) 


  


  


 


 


Lymphocytes # (Auto)


  1.1 x10^3/uL


(1.0-4.8) 


  


  


 


 


Monocytes # (Auto)


  0.8 x10^3/uL


(0.0-1.1) 


  


  


 


 


Eosinophils # (Auto)


  0.0 x10^3/uL


(0.0-0.7) 


  


  


 


 


Basophils # (Auto)


  0.0 x10^3/uL


(0.0-0.2) 


  


  


 


 


Segmented Neutrophils % 88 % (35-66)    


 


Band Neutrophils % 1 % (0-9)    


 


Lymphocytes % 8 % (24-48)    


 


Monocytes % 2 % (0-10)    


 


Eosinophils % 1 % (0-5)    


 


Platelet Estimate


  Adequate


(ADEQUATE) 


  


  


 


 


Glucose (Fingerstick)


  


  131 mg/dL


(70-99) 


  


 








Laboratory Tests








Test


  5/21/17


11:00 5/21/17


16:27 5/21/17


21:28 5/22/17


03:22


 


White Blood Count


  10.3 x10^3/uL


(4.0-11.0) 


  


  


 


 


Red Blood Count


  3.34 x10^6/uL


(4.30-5.70) 


  


  


 


 


Hemoglobin


  10.8 g/dL


(13.0-17.5) 


  


  


 


 


Hematocrit


  32.8 %


(39.0-53.0) 


  


  


 


 


Mean Corpuscular Volume 98 fL ()    


 


Mean Corpuscular Hemoglobin 32 pg (25-35)    


 


Mean Corpuscular Hemoglobin


Concent 33 g/dL


(31-37) 


  


  


 


 


Red Cell Distribution Width


  15.0 %


(11.5-14.5) 


  


  


 


 


Platelet Count


  189 x10^3/uL


(140-400) 


  


  


 


 


Neutrophils (%) (Auto) 81 % (31-73)    


 


Lymphocytes (%) (Auto) 10 % (24-48)    


 


Monocytes (%) (Auto) 8 % (0-9)    


 


Eosinophils (%) (Auto) 1 % (0-3)    


 


Basophils (%) (Auto) 1 % (0-3)    


 


Neutrophils # (Auto)


  8.3 x10^3uL


(1.8-7.7) 


  


  


 


 


Lymphocytes # (Auto)


  1.0 x10^3/uL


(1.0-4.8) 


  


  


 


 


Monocytes # (Auto)


  0.8 x10^3/uL


(0.0-1.1) 


  


  


 


 


Eosinophils # (Auto)


  0.1 x10^3/uL


(0.0-0.7) 


  


  


 


 


Basophils # (Auto)


  0.1 x10^3/uL


(0.0-0.2) 


  


  


 


 


Prothrombin Time


  13.8 SEC


(11.7-14.0) 


  


  


 


 


Prothromb Time International


Ratio 1.1 (0.8-1.1) 


  


  


  


 


 


Activated Partial


Thromboplast Time 31 SEC (24-38) 


  


  


  


 


 


Sodium Level


  138 mmol/L


(136-145) 


  


  139 mmol/L


(136-145)


 


Potassium Level


  4.1 mmol/L


(3.5-5.1) 


  


  4.2 mmol/L


(3.5-5.1)


 


Chloride Level


  100 mmol/L


() 


  


  99 mmol/L


()


 


Carbon Dioxide Level


  28 mmol/L


(21-32) 


  


  27 mmol/L


(21-32)


 


Anion Gap 10 (6-14)    13 (6-14) 


 


Blood Urea Nitrogen


  63 mg/dL


(8-26) 


  


  67 mg/dL


(8-26)


 


Creatinine


  6.3 mg/dL


(0.7-1.3) 


  


  6.6 mg/dL


(0.7-1.3)


 


Estimated GFR


(Cockcroft-Gault) 10.8 


  


  


  10.2 


 


 


BUN/Creatinine Ratio 10 (6-20)    


 


Glucose Level


  162 mg/dL


(70-99) 


  


  135 mg/dL


(70-99)


 


Calcium Level


  8.8 mg/dL


(8.5-10.1) 


  


  8.3 mg/dL


(8.5-10.1)


 


Magnesium Level


  2.2 mg/dL


(1.8-2.4) 


  


  


 


 


Total Bilirubin


  0.6 mg/dL


(0.2-1.0) 


  


  


 


 


Aspartate Amino Transf


(AST/SGOT) 16 U/L (15-37) 


  


  


  


 


 


Alanine Aminotransferase


(ALT/SGPT) 21 U/L (16-63) 


  


  


  


 


 


Alkaline Phosphatase


  83 U/L


() 


  


  


 


 


Creatine Kinase


  145 U/L


() 


  


  


 


 


NT-Pro-B-Type Natriuretic


Peptide 1181 pg/mL


(0-124) 


  


  


 


 


Total Protein


  7.8 g/dL


(6.4-8.2) 


  


  


 


 


Albumin


  2.9 g/dL


(3.4-5.0) 


  


  


 


 


Albumin/Globulin Ratio 0.6 (1.0-1.7)    


 


Lipase


  79 U/L


() 


  


  


 


 


Glucose (Fingerstick)


  


  114 mg/dL


(70-99) 144 mg/dL


(70-99) 


 


 


Test


  5/22/17


03:38 5/22/17


07:40 


  


 


 


White Blood Count


  17.6 x10^3/uL


(4.0-11.0) 


  


  


 


 


Red Blood Count


  3.44 x10^6/uL


(4.30-5.70) 


  


  


 


 


Hemoglobin


  11.0 g/dL


(13.0-17.5) 


  


  


 


 


Hematocrit


  34.0 %


(39.0-53.0) 


  


  


 


 


Mean Corpuscular Volume 99 fL ()    


 


Mean Corpuscular Hemoglobin 32 pg (25-35)    


 


Mean Corpuscular Hemoglobin


Concent 32 g/dL


(31-37) 


  


  


 


 


Red Cell Distribution Width


  15.6 %


(11.5-14.5) 


  


  


 


 


Platelet Count


  222 x10^3/uL


(140-400) 


  


  


 


 


Neutrophils (%) (Auto) 89 % (31-73)    


 


Lymphocytes (%) (Auto) 7 % (24-48)    


 


Monocytes (%) (Auto) 4 % (0-9)    


 


Eosinophils (%) (Auto) 0 % (0-3)    


 


Basophils (%) (Auto) 0 % (0-3)    


 


Neutrophils # (Auto)


  15.7 x10^3uL


(1.8-7.7) 


  


  


 


 


Lymphocytes # (Auto)


  1.1 x10^3/uL


(1.0-4.8) 


  


  


 


 


Monocytes # (Auto)


  0.8 x10^3/uL


(0.0-1.1) 


  


  


 


 


Eosinophils # (Auto)


  0.0 x10^3/uL


(0.0-0.7) 


  


  


 


 


Basophils # (Auto)


  0.0 x10^3/uL


(0.0-0.2) 


  


  


 


 


Segmented Neutrophils % 88 % (35-66)    


 


Band Neutrophils % 1 % (0-9)    


 


Lymphocytes % 8 % (24-48)    


 


Monocytes % 2 % (0-10)    


 


Eosinophils % 1 % (0-5)    


 


Platelet Estimate


  Adequate


(ADEQUATE) 


  


  


 


 


Glucose (Fingerstick)


  


  131 mg/dL


(70-99) 


  


 








Medications





Current Medications


Iohexol (Omnipaque 300 Mg/ml) 50 ml 1X  ONCE IJ  Last administered on 5/21/17at 

10:15;  Start 5/21/17 at 10:00;  Stop 5/21/17 at 10:01;  Status DC


Iohexol (Omnipaque 240 Mg/ml) 50 ml 1X  ONCE PO  Last administered on 5/21/17at 

10:00;  Start 5/21/17 at 10:00;  Stop 5/21/17 at 10:09;  Status DC


Ondansetron HCl (Zofran) 4 mg 1X  ONCE IV  Last administered on 5/21/17at 11:20

;  Start 5/21/17 at 11:15;  Stop 5/21/17 at 11:16;  Status DC


Morphine Sulfate 10 mg STK-MED ONCE .ROUTE ;  Start 5/21/17 at 11:08;  Stop 5/21 /17 at 11:09;  Status DC


Morphine Sulfate 5 mg 1X  ONCE IV  Last administered on 5/21/17at 11:20;  Start 

5/21/17 at 11:15;  Stop 5/21/17 at 11:16;  Status DC


Ondansetron HCl (Zofran) 4 mg 1X  ONCE IV  Last administered on 5/21/17at 12:02

;  Start 5/21/17 at 12:00;  Stop 5/21/17 at 12:01;  Status DC


Hydromorphone HCl (Dilaudid) 2 mg STK-MED ONCE .ROUTE ;  Start 5/21/17 at 13:14

;  Stop 5/21/17 at 13:15;  Status DC


Hydromorphone HCl (Dilaudid) 1 mg 1X  ONCE IV  Last administered on 5/21/17at 13

:19;  Start 5/21/17 at 13:30;  Stop 5/21/17 at 13:31;  Status DC


Ondansetron HCl (Zofran) 4 mg PRN Q8HRS  PRN IV NAUSEA/VOMITING;  Start 5/21/17 

at 14:30;  Stop 5/22/17 at 14:29


Fentanyl Citrate (Fentanyl 2ml Vial) 50 mcg PRN Q1HR  PRN IV PAIN;  Start 5/21/ 17 at 14:30;  Stop 5/22/17 at 14:29


Insulin Aspart (NovoLOG) 0-7 UNITS TIDWMEALS SQ ;  Start 5/21/17 at 17:00


Dextrose (Dextrose 50%-Water Syringe) 12.5 gm PRN Q15MIN  PRN IV SEE COMMENTS;  

Start 5/21/17 at 16:00


Oxycodone/ Acetaminophen (Percocet 5/325) 1 tab PRN Q6HRS  PRN PO PAIN Last 

administered on 5/22/17at 03:30;  Start 5/21/17 at 16:00


Polyethylene Glycol (miraLAX PACKET) 17 gm DAILY PO  Last administered on 5/22/ 17at 08:11;  Start 5/22/17 at 09:00


Bisacodyl (Dulcolax Tab) 10 mg PRN DAILY  PRN PO CONSTIPATION;  Start 5/21/17 

at 21:00





Active Scripts


Active


Calcitriol 0.5 Mcg Capsule 1 Cap PO DAILY


Multi Vitamin Daily (Multivitamin) 1 Each Tablet 1 Each PO DAILY


Reported


Nephro-Samir Tablet (Folic Acid/Vitamin B Comp W-C) 0.8 Mg Tablet 1 Tab PO DAILY


Lasix (Furosemide) 80 Mg Tablet 1 Tab PO BID


Simvastatin 10 Mg Tablet 1 Tab PO QHS


Renvela (Sevelamer Carbonate) 800 Mg Tablet 0.5 Tab PO TID


Percocet 5-325 Mg Tablet (Oxycodone/Acetaminophen) 1 Each Tablet 1 Tab PO Q4HRS 

PRN


     dose taken __________,  next dose may be taken at __________


Vitals/I & O





Vital Sign - Last 24 Hours








 5/21/17 5/21/17 5/21/17 5/21/17





 09:16 11:20 11:58 12:18


 


Temp 98.8   





 98.8   


 


Pulse 79  78 88


 


Resp 18 18 24 20


 


B/P (MAP) 110/63 (79)  167/86 (113) 159/76 (103)


 


Pulse Ox 98 95 95 94


 


O2 Delivery Room Air Room Air Room Air 


 


    





    





 5/21/17 5/21/17 5/21/17 5/21/17





 12:53 13:18 13:19 13:48


 


Pulse 100 98  94


 


Resp 20 18 20 16


 


B/P (MAP) 164/96 (118) 145/70 (95)  123/63 (83)


 


Pulse Ox 94 95 95 96


 


O2 Delivery  Room Air Room Air Nasal Cannula


 


O2 Flow Rate    2.0





 5/21/17 5/21/17 5/21/17 5/21/17





 14:58 16:19 17:40 19:20


 


Temp 98.3 98.3  98.4





 98.3 98.3  98.4


 


Pulse 95 95  90


 


Resp 20   20


 


B/P (MAP) 138/78 (98) 138/78 (98)  95/62 (73)


 


Pulse Ox 93 93  95


 


O2 Delivery Room Air  Room Air Room Air


 


O2 Flow Rate  2.0  


 


    





    





 5/21/17 5/21/17 5/22/17 5/22/17





 20:00 23:00 03:20 03:30


 


Temp  98.8 97.8 





  98.8 97.8 


 


Pulse  85 84 


 


Resp  18 18 20


 


B/P (MAP)  107/64 (78) 99/60 (73) 


 


Pulse Ox  90 94 


 


O2 Delivery Room Air  Room Air Room Air


 


O2 Flow Rate 2.0   


 


    





    





 5/22/17 5/22/17  





 07:00 07:15  


 


Temp 97.7   





 97.7   


 


Pulse 81   


 


Resp 20   


 


B/P (MAP) 102/57 (72)   


 


Pulse Ox 95 95  


 


O2 Delivery Room Air Room Air  


 


O2 Flow Rate  2.0  














Intake and Output   


 


 5/21/17 5/21/17 5/22/17





 14:59 22:59 06:59


 


Intake Total  120 ml 


 


Output Total  600 ml 325 ml


 


Balance  -480 ml -325 ml

















KIESHA NUNO MD May 22, 2017 08:50

## 2017-05-22 NOTE — ACF
Admit Criteria Forms


                         RENAL FAILURE, CHRONIC





Clinical Indications for Admission to Inpatient Care





                                                                     (Place 'X' 

for any and all applicable criteria):





Admission is indicated for ANY ONE of the following (1)(2)(3)(4)(5):


[X]I.   Inpatient admission required rather than observation care (Use Renal 

Failure, Chronic: Observation Care 


        Criteria as appropriate) because of ANY ONE of the following:


               [X]a)Volume overload or uremic symptoms (eg, clinically 

significant pulmonary edema, hypertension, 


                        pericarditis, acidosis) too severe for, or not 

responsive (eg, for over 24 hours) to emergency 


                        department or observation care dialysis or treatment 

regimen (11)


                [ ]b)  Hemodynamic instability that is severe or persistent


                [ ]c)  Respiratory distress that is severe or persistent (11)


                [ ]d)  Clinically significant electrolyte abnormality that 

requires inpatient care (eg,hyperkalemia with 


                        severe ECG findings)[B]


                [ ]e)  Supplement O2 or respiratory therapy for over 24hrs that 

is performable only in acute inpatient setting


                [ ]f)   Continuous IV infusion of anticoagulation, platelet 

inhibitor, vasoactive, or Antiarrhythmic medication (15),


                [ ]g)  Pulmonary artery catheter monitoring               


                [ ]h)  Temporary pacemaker placement


                [ ]i)   Emergent pericardiocentesis                      


                [ ]j)   Other condition, treatment or monitoring requiring 

inpatient admission


[ ]II.   Unexplained syncope [A]


[ ]III.  Recurrent seizures 


[ ]IV. Severe infections not treatable in outpatient setting (eg, peritonitis)(9

)


[ ]V.  Cardiac arrhythmias of immediate concern


[ ]VI. Encephalopathy


[ ]VII.Bleeding abnormalities (eg, platelet dysfunction) with active (eg, 

gastrointestinal)  bleeding











Extended stay beyond goal length of stay may be needed for (3)(4)(35)(36):


[ ]a)   Continuing uremic complications


[ ]b)   Comorbidities or complications


   


     


                                                                          


The original [employer] content created by [employer] has been revised. 


The portions of the content which have been revised are identified through the 

use of italic text or in bold, and [email or web address] 


has neither reviewed nor approved the modified material. All other unmodified 

content is copyright  [employer].                              





Please see references footnoted in the original [email or web address] edition 

2016











AGUSTIN WHITE May 22, 2017 03:52

## 2017-05-23 VITALS — SYSTOLIC BLOOD PRESSURE: 152 MMHG | DIASTOLIC BLOOD PRESSURE: 69 MMHG

## 2017-05-23 VITALS — DIASTOLIC BLOOD PRESSURE: 79 MMHG | SYSTOLIC BLOOD PRESSURE: 142 MMHG

## 2017-05-23 VITALS — SYSTOLIC BLOOD PRESSURE: 117 MMHG | DIASTOLIC BLOOD PRESSURE: 61 MMHG

## 2017-05-23 VITALS — SYSTOLIC BLOOD PRESSURE: 135 MMHG | DIASTOLIC BLOOD PRESSURE: 77 MMHG

## 2017-05-23 VITALS — DIASTOLIC BLOOD PRESSURE: 67 MMHG | SYSTOLIC BLOOD PRESSURE: 112 MMHG

## 2017-05-23 VITALS — SYSTOLIC BLOOD PRESSURE: 120 MMHG | DIASTOLIC BLOOD PRESSURE: 67 MMHG

## 2017-05-23 VITALS — DIASTOLIC BLOOD PRESSURE: 76 MMHG | SYSTOLIC BLOOD PRESSURE: 151 MMHG

## 2017-05-23 VITALS — DIASTOLIC BLOOD PRESSURE: 67 MMHG | SYSTOLIC BLOOD PRESSURE: 120 MMHG

## 2017-05-23 VITALS — SYSTOLIC BLOOD PRESSURE: 113 MMHG | DIASTOLIC BLOOD PRESSURE: 71 MMHG

## 2017-05-23 VITALS — SYSTOLIC BLOOD PRESSURE: 147 MMHG | DIASTOLIC BLOOD PRESSURE: 82 MMHG

## 2017-05-23 LAB
ANION GAP SERPL CALC-SCNC: 13 MMOL/L (ref 6–14)
BUN SERPL-MCNC: 77 MG/DL (ref 8–26)
CALCIUM SERPL-MCNC: 8.7 MG/DL (ref 8.5–10.1)
CHLORIDE SERPL-SCNC: 99 MMOL/L (ref 98–107)
CO2 SERPL-SCNC: 26 MMOL/L (ref 21–32)
CREAT SERPL-MCNC: 7.3 MG/DL (ref 0.7–1.3)
ERYTHROCYTE [DISTWIDTH] IN BLOOD BY AUTOMATED COUNT: 15.6 % (ref 11.5–14.5)
GFR SERPLBLD BASED ON 1.73 SQ M-ARVRAT: 9.1 ML/MIN
GLUCOSE SERPL-MCNC: 142 MG/DL (ref 70–99)
HCT VFR BLD CALC: 32.1 % (ref 39–53)
HGB BLD-MCNC: 10.7 G/DL (ref 13–17.5)
MCH RBC QN AUTO: 33 PG (ref 25–35)
MCHC RBC AUTO-ENTMCNC: 33 G/DL (ref 31–37)
MCV RBC AUTO: 98 FL (ref 79–100)
PLATELET # BLD AUTO: 218 X10^3/UL (ref 140–400)
POTASSIUM SERPL-SCNC: 4.1 MMOL/L (ref 3.5–5.1)
RBC # BLD AUTO: 3.27 X10^6/UL (ref 4.3–5.7)
SODIUM SERPL-SCNC: 138 MMOL/L (ref 136–145)
WBC # BLD AUTO: 14.4 X10^3/UL (ref 4–11)

## 2017-05-23 PROCEDURE — 5A1D60Z: ICD-10-PCS | Performed by: RADIOLOGY

## 2017-05-23 PROCEDURE — 02H633Z INSERTION OF INFUSION DEVICE INTO RIGHT ATRIUM, PERCUTANEOUS APPROACH: ICD-10-PCS | Performed by: RADIOLOGY

## 2017-05-23 PROCEDURE — B244ZZZ ULTRASONOGRAPHY OF RIGHT HEART: ICD-10-PCS | Performed by: RADIOLOGY

## 2017-05-23 PROCEDURE — 0WPG43Z REMOVAL OF INFUSION DEVICE FROM PERITONEAL CAVITY, PERCUTANEOUS ENDOSCOPIC APPROACH: ICD-10-PCS | Performed by: SURGERY

## 2017-05-23 RX ADMIN — PIPERACILLIN SODIUM AND TAZOBACTAM SODIUM SCH MLS/HR: 2; .25 INJECTION, POWDER, LYOPHILIZED, FOR SOLUTION INTRAVENOUS at 17:40

## 2017-05-23 RX ADMIN — SIMVASTATIN SCH MG: 10 TABLET, FILM COATED ORAL at 20:16

## 2017-05-23 RX ADMIN — OXYCODONE HYDROCHLORIDE AND ACETAMINOPHEN PRN TAB: 5; 325 TABLET ORAL at 05:15

## 2017-05-23 RX ADMIN — PIPERACILLIN SODIUM AND TAZOBACTAM SODIUM SCH MLS/HR: 2; .25 INJECTION, POWDER, LYOPHILIZED, FOR SOLUTION INTRAVENOUS at 05:15

## 2017-05-23 RX ADMIN — MORPHINE SULFATE PRN MG: 4 INJECTION, SOLUTION INTRAMUSCULAR; INTRAVENOUS at 18:29

## 2017-05-23 RX ADMIN — MORPHINE SULFATE PRN MG: 4 INJECTION, SOLUTION INTRAMUSCULAR; INTRAVENOUS at 22:32

## 2017-05-23 RX ADMIN — BACITRACIN SCH MLS/HR: 5000 INJECTION, POWDER, FOR SOLUTION INTRAMUSCULAR at 14:15

## 2017-05-23 RX ADMIN — PIPERACILLIN SODIUM AND TAZOBACTAM SODIUM SCH MLS/HR: 2; .25 INJECTION, POWDER, LYOPHILIZED, FOR SOLUTION INTRAVENOUS at 22:32

## 2017-05-23 RX ADMIN — POLYETHYLENE GLYCOL 3350 SCH GM: 17 POWDER, FOR SOLUTION ORAL at 17:37

## 2017-05-23 RX ADMIN — MORPHINE SULFATE PRN MG: 4 INJECTION, SOLUTION INTRAMUSCULAR; INTRAVENOUS at 03:07

## 2017-05-23 RX ADMIN — FUROSEMIDE SCH MG: 80 TABLET ORAL at 17:37

## 2017-05-23 RX ADMIN — SEVELAMER CARBONATE SCH MG: 800 TABLET, FILM COATED ORAL at 17:38

## 2017-05-23 RX ADMIN — INSULIN ASPART SCH UNITS: 100 INJECTION, SOLUTION INTRAVENOUS; SUBCUTANEOUS at 17:00

## 2017-05-23 RX ADMIN — PIPERACILLIN SODIUM AND TAZOBACTAM SODIUM SCH MLS/HR: 2; .25 INJECTION, POWDER, LYOPHILIZED, FOR SOLUTION INTRAVENOUS at 11:04

## 2017-05-23 RX ADMIN — Medication SCH TAB: at 17:38

## 2017-05-23 RX ADMIN — OXYCODONE HYDROCHLORIDE AND ACETAMINOPHEN PRN TAB: 5; 325 TABLET ORAL at 20:16

## 2017-05-23 RX ADMIN — SEVELAMER CARBONATE SCH MG: 800 TABLET, FILM COATED ORAL at 08:00

## 2017-05-23 RX ADMIN — FUROSEMIDE SCH MG: 80 TABLET ORAL at 09:00

## 2017-05-23 RX ADMIN — SEVELAMER CARBONATE SCH MG: 800 TABLET, FILM COATED ORAL at 17:00

## 2017-05-23 RX ADMIN — INSULIN ASPART SCH UNITS: 100 INJECTION, SOLUTION INTRAVENOUS; SUBCUTANEOUS at 08:00

## 2017-05-23 RX ADMIN — INSULIN ASPART SCH UNITS: 100 INJECTION, SOLUTION INTRAVENOUS; SUBCUTANEOUS at 12:00

## 2017-05-23 RX ADMIN — MORPHINE SULFATE PRN MG: 4 INJECTION, SOLUTION INTRAMUSCULAR; INTRAVENOUS at 11:03

## 2017-05-23 RX ADMIN — OXYCODONE HYDROCHLORIDE AND ACETAMINOPHEN PRN TAB: 5; 325 TABLET ORAL at 13:11

## 2017-05-23 RX ADMIN — CALCITRIOL SCH MCG: 0.25 CAPSULE, LIQUID FILLED ORAL at 17:38

## 2017-05-23 NOTE — PDOC
Infectious Disease Note


Subjective


Subjective


pt feeling better, some abd pain +





ROS


ROS


GEN: Denies fevers, chills, sweats


HEENT: Denies blurred vision, sore throat


CV: Denies chest pain


RESP: Denies shortness of air, cough


GI: Denies n/v/d


NEURO: Denies confusion, dizziness


MSK: Denies weakness, joint pain/swelling





Vital Sign


Vital Signs





Vital Signs








  Date Time  Temp Pulse Resp B/P (MAP) Pulse Ox O2 Delivery O2 Flow Rate FiO2


 


5/23/17 07:00 98.0 90 18 113/71 (85) 91 Room Air  





 98.0       


 


5/23/17 05:15       2.0 











Physical Exam


PHYSICAL EXAM


GENERAL:  NAD, Alert


HEENT:  PERRL, OC/OP


NECK:  Supple, no JVD, no LN


LUNGS:  Clear


HEART:  S1S2, no gallop, no murmur


ABD:  Soft, NT, no organomegaly, no rebound


EXT:  No edema, no cyanosis


CNS:  Alert, oriented x 3, no focal neurologic deficit


SKIN:  No rash


IV: ok





Labs


Lab





Laboratory Tests








Test


  5/22/17


10:33 5/22/17


16:30 5/22/17


20:52 5/23/17


03:20


 


Glucose (Fingerstick)


  156 mg/dL


(70-99) 124 mg/dL


(70-99) 169 mg/dL


(70-99) 


 


 


White Blood Count


  


  


  


  14.4 x10^3/uL


(4.0-11.0)


 


Red Blood Count


  


  


  


  3.27 x10^6/uL


(4.30-5.70)


 


Hemoglobin


  


  


  


  10.7 g/dL


(13.0-17.5)


 


Hematocrit


  


  


  


  32.1 %


(39.0-53.0)


 


Mean Corpuscular Volume    98 fL () 


 


Mean Corpuscular Hemoglobin    33 pg (25-35) 


 


Mean Corpuscular Hemoglobin


Concent 


  


  


  33 g/dL


(31-37)


 


Red Cell Distribution Width


  


  


  


  15.6 %


(11.5-14.5)


 


Platelet Count


  


  


  


  218 x10^3/uL


(140-400)


 


Sodium Level


  


  


  


  138 mmol/L


(136-145)


 


Potassium Level


  


  


  


  4.1 mmol/L


(3.5-5.1)


 


Chloride Level


  


  


  


  99 mmol/L


()


 


Carbon Dioxide Level


  


  


  


  26 mmol/L


(21-32)


 


Anion Gap    13 (6-14) 


 


Blood Urea Nitrogen


  


  


  


  77 mg/dL


(8-26)


 


Creatinine


  


  


  


  7.3 mg/dL


(0.7-1.3)


 


Estimated GFR


(Cockcroft-Gault) 


  


  


  9.1 


 


 


Glucose Level


  


  


  


  142 mg/dL


(70-99)


 


Calcium Level


  


  


  


  8.7 mg/dL


(8.5-10.1)


 


Test


  5/23/17


07:26 


  


  


 


 


Glucose (Fingerstick)


  162 mg/dL


(70-99) 


  


  


 











Objective


Assessment


Abdominal pain


PD cath mal function vs infection/peritonitis


ESRD


DM


Chronic lymphedema


CHF





Plan


Plan of Care


unable to get PD fluid


zosyn


check culture


d/w dr Rhett CRUZ,SALOME BEACH MD May 23, 2017 10:23

## 2017-05-23 NOTE — RAD
Fluoroscopy guided peritoneal dialysis catheter check



Indication: 66-year-old male with end stage renal disease. His peritoneal

dialysis catheter is nonfunctioning. He has severe left lower quadrant pain.

Peritoneal dialysis catheter check has been requested.



Fluoroscopy time: 1.7 minutes



Kerma-area Product:    14 Gycm2



Contrast material: 2 cc Omnipaque 300



Anesthesia: None.



Sterility: All elements of maximal sterile barrier technique, hand hygiene,

skin preparation, and, if ultrasound was used, sterile ultrasound technique

were followed.





Procedure: Informed consent was obtained from the patient. He was placed

supine on the angiography table. Preliminary fluoroscopic evaluation revealed

the presence of an intact peritoneal dialysis catheter, with its tip coiled

within left hemipelvis.  Using aseptic technique, aspiration through the

peritoneal dialysis catheter was attempted but was unsuccessful. Using aseptic

technique, a small amount of dilute Omnipaque 300 was injected into the PDC. 

This resulted in contrast opacification of only proximal portion of the

dialysis catheter, with abrupt exacerbation of left lower quadrant pain, with

radiation to left groin.  Using aseptic technique and fluoroscopic guidance, a

Terumo advantage guidewire, followed by a Roadrunner guidewire, were

sequentially advanced through the peritoneal dialysis catheter, with some

resistance.  This guidewire manipulation failed to restore PDC function. The

peritoneal dialysis check procedure was then terminated. Referring general

surgeon was contacted.



Impression: Fluoroscopy guided peritoneal dialysis catheter check, as

described.  Attempted injection of a minimal amount of dilute Omnipaque 300

resulted in acute exacerbation of left lower quadrant pain, with radiation to

left groin. Guidewire manipulation failed to restore PDC patency/function.

Referring general surgeon was contacted. PDC removal was recommended.

## 2017-05-23 NOTE — PDOC
PROGRESS NOTES


Subjective


Subjective


Patient reports abdominal pain persists but not as bad as at admission.





Objective


Objective





Vital Signs








  Date Time  Temp Pulse Resp B/P (MAP) Pulse Ox O2 Delivery O2 Flow Rate FiO2


 


5/23/17 07:00 98.0 90 18 113/71 (85) 91 Room Air  





 98.0       


 


5/23/17 05:15       2.0 














Intake and Output 


 


 5/23/17





 07:00


 


Intake Total 1400 ml


 


Output Total 600 ml


 


Balance 800 ml


 


 


 


Intake Oral 1350 ml


 


IV Total 50 ml


 


Output Urine Total 600 ml


 


# Voids 1











Physical Exam


Abdomen:  Normal bowel sounds, Soft, Other (mild diffuse TTP)


Heart:  Regular rate


Extremities:  Other (severe chronic lymphedema bilateral LE's)


General:  Alert, Oriented X3, No acute distress


Lungs:  Other (BS decreased throughout but otherwise CTA)





Assessment


Assessment


Problems


Medical Problems:


(1) Abdominal pain


Status: Acute  





(2) ESRD (end stage renal disease) on dialysis


Status: Acute  





(3) Fluid overload


Status: Acute  





(4) Peripheral edema


Status: Acute  











Plan


Plan of Care


1. Abdominal pain with possible peritonitis - stable, afebrile, WBC's mildly 

improved today. Continue Zosyn per ID. Patient has po and IV pain meds ordered. 

Awaiting PICC line for resumption of abx.


2. ESRD - peritoneal dialysis catheter nonfunctional, IR unable to unblock. Dr Whitley to remove the catheter today. Patient to get temporary hemodialysis 

catheter placed, tx as per Dr Fraga.


3. DM2 - fairly well controlled with diet, continue SS insulin as needed.


4. mild systolic CHF - stable, continue his usual Lasix.





Comment


Review of Relevant


I have reviewed the following items henry (where applicable) has been applied.


Labs





Laboratory Tests








Test


  5/21/17


11:00 5/21/17


16:27 5/21/17


21:28 5/22/17


03:22


 


White Blood Count


  10.3 x10^3/uL


(4.0-11.0) 


  


  


 


 


Red Blood Count


  3.34 x10^6/uL


(4.30-5.70) 


  


  


 


 


Hemoglobin


  10.8 g/dL


(13.0-17.5) 


  


  


 


 


Hematocrit


  32.8 %


(39.0-53.0) 


  


  


 


 


Mean Corpuscular Volume 98 fL ()    


 


Mean Corpuscular Hemoglobin 32 pg (25-35)    


 


Mean Corpuscular Hemoglobin


Concent 33 g/dL


(31-37) 


  


  


 


 


Red Cell Distribution Width


  15.0 %


(11.5-14.5) 


  


  


 


 


Platelet Count


  189 x10^3/uL


(140-400) 


  


  


 


 


Neutrophils (%) (Auto) 81 % (31-73)    


 


Lymphocytes (%) (Auto) 10 % (24-48)    


 


Monocytes (%) (Auto) 8 % (0-9)    


 


Eosinophils (%) (Auto) 1 % (0-3)    


 


Basophils (%) (Auto) 1 % (0-3)    


 


Neutrophils # (Auto)


  8.3 x10^3uL


(1.8-7.7) 


  


  


 


 


Lymphocytes # (Auto)


  1.0 x10^3/uL


(1.0-4.8) 


  


  


 


 


Monocytes # (Auto)


  0.8 x10^3/uL


(0.0-1.1) 


  


  


 


 


Eosinophils # (Auto)


  0.1 x10^3/uL


(0.0-0.7) 


  


  


 


 


Basophils # (Auto)


  0.1 x10^3/uL


(0.0-0.2) 


  


  


 


 


Prothrombin Time


  13.8 SEC


(11.7-14.0) 


  


  


 


 


Prothromb Time International


Ratio 1.1 (0.8-1.1) 


  


  


  


 


 


Activated Partial


Thromboplast Time 31 SEC (24-38) 


  


  


  


 


 


Sodium Level


  138 mmol/L


(136-145) 


  


  139 mmol/L


(136-145)


 


Potassium Level


  4.1 mmol/L


(3.5-5.1) 


  


  4.2 mmol/L


(3.5-5.1)


 


Chloride Level


  100 mmol/L


() 


  


  99 mmol/L


()


 


Carbon Dioxide Level


  28 mmol/L


(21-32) 


  


  27 mmol/L


(21-32)


 


Anion Gap 10 (6-14)    13 (6-14) 


 


Blood Urea Nitrogen


  63 mg/dL


(8-26) 


  


  67 mg/dL


(8-26)


 


Creatinine


  6.3 mg/dL


(0.7-1.3) 


  


  6.6 mg/dL


(0.7-1.3)


 


Estimated GFR


(Cockcroft-Gault) 10.8 


  


  


  10.2 


 


 


BUN/Creatinine Ratio 10 (6-20)    


 


Glucose Level


  162 mg/dL


(70-99) 


  


  135 mg/dL


(70-99)


 


Calcium Level


  8.8 mg/dL


(8.5-10.1) 


  


  8.3 mg/dL


(8.5-10.1)


 


Magnesium Level


  2.2 mg/dL


(1.8-2.4) 


  


  


 


 


Total Bilirubin


  0.6 mg/dL


(0.2-1.0) 


  


  


 


 


Aspartate Amino Transf


(AST/SGOT) 16 U/L (15-37) 


  


  


  


 


 


Alanine Aminotransferase


(ALT/SGPT) 21 U/L (16-63) 


  


  


  


 


 


Alkaline Phosphatase


  83 U/L


() 


  


  


 


 


Creatine Kinase


  145 U/L


() 


  


  


 


 


NT-Pro-B-Type Natriuretic


Peptide 1181 pg/mL


(0-124) 


  


  


 


 


Total Protein


  7.8 g/dL


(6.4-8.2) 


  


  


 


 


Albumin


  2.9 g/dL


(3.4-5.0) 


  


  


 


 


Albumin/Globulin Ratio 0.6 (1.0-1.7)    


 


Lipase


  79 U/L


() 


  


  


 


 


Glucose (Fingerstick)


  


  114 mg/dL


(70-99) 144 mg/dL


(70-99) 


 


 


Test


  5/22/17


03:38 5/22/17


03:40 5/22/17


07:40 5/22/17


10:33


 


White Blood Count


  17.6 x10^3/uL


(4.0-11.0) 


  


  


 


 


Red Blood Count


  3.44 x10^6/uL


(4.30-5.70) 


  


  


 


 


Hemoglobin


  11.0 g/dL


(13.0-17.5) 


  


  


 


 


Hematocrit


  34.0 %


(39.0-53.0) 


  


  


 


 


Mean Corpuscular Volume 99 fL ()    


 


Mean Corpuscular Hemoglobin 32 pg (25-35)    


 


Mean Corpuscular Hemoglobin


Concent 32 g/dL


(31-37) 


  


  


 


 


Red Cell Distribution Width


  15.6 %


(11.5-14.5) 


  


  


 


 


Platelet Count


  222 x10^3/uL


(140-400) 


  


  


 


 


Neutrophils (%) (Auto) 89 % (31-73)    


 


Lymphocytes (%) (Auto) 7 % (24-48)    


 


Monocytes (%) (Auto) 4 % (0-9)    


 


Eosinophils (%) (Auto) 0 % (0-3)    


 


Basophils (%) (Auto) 0 % (0-3)    


 


Neutrophils # (Auto)


  15.7 x10^3uL


(1.8-7.7) 


  


  


 


 


Lymphocytes # (Auto)


  1.1 x10^3/uL


(1.0-4.8) 


  


  


 


 


Monocytes # (Auto)


  0.8 x10^3/uL


(0.0-1.1) 


  


  


 


 


Eosinophils # (Auto)


  0.0 x10^3/uL


(0.0-0.7) 


  


  


 


 


Basophils # (Auto)


  0.0 x10^3/uL


(0.0-0.2) 


  


  


 


 


Segmented Neutrophils % 88 % (35-66)    


 


Band Neutrophils % 1 % (0-9)    


 


Lymphocytes % 8 % (24-48)    


 


Monocytes % 2 % (0-10)    


 


Eosinophils % 1 % (0-5)    


 


Platelet Estimate


  Adequate


(ADEQUATE) 


  


  


 


 


Nasal Screen MRSA (PCR)


  


  Negative


(Negative) 


  


 


 


Glucose (Fingerstick)


  


  


  131 mg/dL


(70-99) 156 mg/dL


(70-99)


 


Test


  5/22/17


16:30 5/22/17


20:52 5/23/17


03:20 5/23/17


07:26


 


Glucose (Fingerstick)


  124 mg/dL


(70-99) 169 mg/dL


(70-99) 


  162 mg/dL


(70-99)


 


White Blood Count


  


  


  14.4 x10^3/uL


(4.0-11.0) 


 


 


Red Blood Count


  


  


  3.27 x10^6/uL


(4.30-5.70) 


 


 


Hemoglobin


  


  


  10.7 g/dL


(13.0-17.5) 


 


 


Hematocrit


  


  


  32.1 %


(39.0-53.0) 


 


 


Mean Corpuscular Volume   98 fL ()  


 


Mean Corpuscular Hemoglobin   33 pg (25-35)  


 


Mean Corpuscular Hemoglobin


Concent 


  


  33 g/dL


(31-37) 


 


 


Red Cell Distribution Width


  


  


  15.6 %


(11.5-14.5) 


 


 


Platelet Count


  


  


  218 x10^3/uL


(140-400) 


 


 


Sodium Level


  


  


  138 mmol/L


(136-145) 


 


 


Potassium Level


  


  


  4.1 mmol/L


(3.5-5.1) 


 


 


Chloride Level


  


  


  99 mmol/L


() 


 


 


Carbon Dioxide Level


  


  


  26 mmol/L


(21-32) 


 


 


Anion Gap   13 (6-14)  


 


Blood Urea Nitrogen


  


  


  77 mg/dL


(8-26) 


 


 


Creatinine


  


  


  7.3 mg/dL


(0.7-1.3) 


 


 


Estimated GFR


(Cockcroft-Gault) 


  


  9.1 


  


 


 


Glucose Level


  


  


  142 mg/dL


(70-99) 


 


 


Calcium Level


  


  


  8.7 mg/dL


(8.5-10.1) 


 








Laboratory Tests








Test


  5/22/17


10:33 5/22/17


16:30 5/22/17


20:52 5/23/17


03:20


 


Glucose (Fingerstick)


  156 mg/dL


(70-99) 124 mg/dL


(70-99) 169 mg/dL


(70-99) 


 


 


White Blood Count


  


  


  


  14.4 x10^3/uL


(4.0-11.0)


 


Red Blood Count


  


  


  


  3.27 x10^6/uL


(4.30-5.70)


 


Hemoglobin


  


  


  


  10.7 g/dL


(13.0-17.5)


 


Hematocrit


  


  


  


  32.1 %


(39.0-53.0)


 


Mean Corpuscular Volume    98 fL () 


 


Mean Corpuscular Hemoglobin    33 pg (25-35) 


 


Mean Corpuscular Hemoglobin


Concent 


  


  


  33 g/dL


(31-37)


 


Red Cell Distribution Width


  


  


  


  15.6 %


(11.5-14.5)


 


Platelet Count


  


  


  


  218 x10^3/uL


(140-400)


 


Sodium Level


  


  


  


  138 mmol/L


(136-145)


 


Potassium Level


  


  


  


  4.1 mmol/L


(3.5-5.1)


 


Chloride Level


  


  


  


  99 mmol/L


()


 


Carbon Dioxide Level


  


  


  


  26 mmol/L


(21-32)


 


Anion Gap    13 (6-14) 


 


Blood Urea Nitrogen


  


  


  


  77 mg/dL


(8-26)


 


Creatinine


  


  


  


  7.3 mg/dL


(0.7-1.3)


 


Estimated GFR


(Cockcroft-Gault) 


  


  


  9.1 


 


 


Glucose Level


  


  


  


  142 mg/dL


(70-99)


 


Calcium Level


  


  


  


  8.7 mg/dL


(8.5-10.1)


 


Test


  5/23/17


07:26 


  


  


 


 


Glucose (Fingerstick)


  162 mg/dL


(70-99) 


  


  


 








Medications





Current Medications


Iohexol (Omnipaque 300 Mg/ml) 50 ml 1X  ONCE IJ  Last administered on 5/21/17at 

10:15;  Start 5/21/17 at 10:00;  Stop 5/21/17 at 10:01;  Status DC


Iohexol (Omnipaque 240 Mg/ml) 50 ml 1X  ONCE PO  Last administered on 5/21/17at 

10:00;  Start 5/21/17 at 10:00;  Stop 5/21/17 at 10:09;  Status DC


Ondansetron HCl (Zofran) 4 mg 1X  ONCE IV  Last administered on 5/21/17at 11:20

;  Start 5/21/17 at 11:15;  Stop 5/21/17 at 11:16;  Status DC


Morphine Sulfate 10 mg STK-MED ONCE .ROUTE ;  Start 5/21/17 at 11:08;  Stop 5/21 /17 at 11:09;  Status DC


Morphine Sulfate 5 mg 1X  ONCE IV  Last administered on 5/21/17at 11:20;  Start 

5/21/17 at 11:15;  Stop 5/21/17 at 11:16;  Status DC


Ondansetron HCl (Zofran) 4 mg 1X  ONCE IV  Last administered on 5/21/17at 12:02

;  Start 5/21/17 at 12:00;  Stop 5/21/17 at 12:01;  Status DC


Hydromorphone HCl (Dilaudid) 2 mg STK-MED ONCE .ROUTE ;  Start 5/21/17 at 13:14

;  Stop 5/21/17 at 13:15;  Status DC


Hydromorphone HCl (Dilaudid) 1 mg 1X  ONCE IV  Last administered on 5/21/17at 13

:19;  Start 5/21/17 at 13:30;  Stop 5/21/17 at 13:31;  Status DC


Ondansetron HCl (Zofran) 4 mg PRN Q8HRS  PRN IV NAUSEA/VOMITING;  Start 5/21/17 

at 14:30;  Stop 5/22/17 at 14:29;  Status DC


Fentanyl Citrate (Fentanyl 2ml Vial) 50 mcg PRN Q1HR  PRN IV PAIN Last 

administered on 5/22/17at 13:09;  Start 5/21/17 at 14:30;  Stop 5/22/17 at 14:29

;  Status DC


Insulin Aspart (NovoLOG) 0-7 UNITS TIDWMEALS SQ ;  Start 5/21/17 at 17:00


Dextrose (Dextrose 50%-Water Syringe) 12.5 gm PRN Q15MIN  PRN IV SEE COMMENTS;  

Start 5/21/17 at 16:00


Oxycodone/ Acetaminophen (Percocet 5/325) 1 tab PRN Q6HRS  PRN PO PAIN Last 

administered on 5/22/17at 10:56;  Start 5/21/17 at 16:00


Polyethylene Glycol (miraLAX PACKET) 17 gm DAILY PO  Last administered on 5/22/ 17at 08:11;  Start 5/22/17 at 09:00


Bisacodyl (Dulcolax Tab) 10 mg PRN DAILY  PRN PO CONSTIPATION;  Start 5/21/17 

at 21:00


Vitamin B Complex/ Vitamin C (Winsome-Samir) 1 tab DAILY PO  Last administered on 5/ 22/17at 10:07;  Start 5/22/17 at 09:00


Furosemide (Lasix) 80 mg BID94 PO  Last administered on 5/22/17at 15:49;  Start 

5/22/17 at 09:00


Sevelamer Carbonate (Renvela) 400 mg TIDWMEALS PO  Last administered on 5/22/ 17at 17:02;  Start 5/22/17 at 09:00


Simvastatin (Zocor) 10 mg QHS PO  Last administered on 5/22/17at 20:27;  Start 5 /22/17 at 21:00


Calcitriol (Rocaltrol) 0.5 mcg DAILY PO  Last administered on 5/22/17at 10:07;  

Start 5/22/17 at 09:00


Piperacillin Sod/ Tazobactam Sod 2.25 gm/Sodium Chloride 50 ml @  100 mls/hr 

Q8HRS IV  Last administered on 5/22/17at 20:27;  Start 5/22/17 at 10:00


Info (PHARMACY MONITORING -- do not chart) 4 each Q6HRS MC ;  Start 5/22/17 at 

12:00;  Status Cancel


Peritoneal Dialysis Solution 2,000 ml @  500 mls/hr Q4HRS IP  Last administered 

on 5/22/17at 12:41;  Start 5/22/17 at 12:00;  Stop 5/22/17 at 15:12;  Status DC


Iohexol (Omnipaque 300 Mg/ml) 50 ml STK-MED ONCE .ROUTE ;  Start 5/22/17 at 14:

27;  Stop 5/22/17 at 14:28;  Status DC


Iohexol (Omnipaque 300 Mg/ml) 50 ml 1X  ONCE INT CAT  Last administered on 5/22/ 17at 14:58;  Start 5/22/17 at 15:00;  Stop 5/22/17 at 15:01;  Status DC


Morphine Sulfate 2 mg PRN Q4HRS  PRN IV SEVERE PAIN Last administered on 5/22/ 17at 15:49;  Start 5/22/17 at 15:45


Morphine Sulfate 3 mg PRN Q4HRS  PRN IV SEVERE PAIN;  Start 5/22/17 at 16:00


Morphine Sulfate 4 mg PRN Q4HRS  PRN IV SEVERE PAIN Last administered on 5/23/ 17at 03:07;  Start 5/22/17 at 16:00


Morphine Sulfate 5 mg PRN Q4HRS  PRN IV SEVERE PAIN;  Start 5/22/17 at 16:00


Cefazolin Sodium/ Dextrose 50 ml @  100 mls/hr 1X PREOP  PRN IV prophylaxis;  

Start 5/23/17 at 06:00;  Stop 5/23/17 at 18:00


Oxycodone/ Acetaminophen (Percocet 5/325) 2 tab PRN Q6HRS  PRN PO PAIN Last 

administered on 5/23/17at 05:15;  Start 5/22/17 at 18:30





Active Scripts


Active


Calcitriol 0.5 Mcg Capsule 1 Cap PO DAILY


Reported


Nephro-Samir Tablet (Folic Acid/Vitamin B Comp W-C) 0.8 Mg Tablet 1 Tab PO DAILY


Lasix (Furosemide) 80 Mg Tablet 1 Tab PO BID


Simvastatin 10 Mg Tablet 1 Tab PO QHS


Renvela (Sevelamer Carbonate) 800 Mg Tablet 0.5 Tab PO TID


Percocet 5-325 Mg Tablet (Oxycodone/Acetaminophen) 1 Each Tablet 1 Tab PO Q4HRS 

PRN


     dose taken __________,  next dose may be taken at __________


Vitals/I & O





Vital Sign - Last 24 Hours








 5/22/17 5/22/17 5/22/17 5/22/17





 10:54 10:56 12:00 13:09


 


Temp 97.8   





 97.8   


 


Pulse 93   


 


Resp 20   


 


B/P (MAP) 104/55 (71)   


 


Pulse Ox 96 96 96 96


 


O2 Delivery Room Air Room Air Room Air Room Air


 


O2 Flow Rate  2.0 2.0 2.0


 


    





    





 5/22/17 5/22/17 5/22/17 5/22/17





 13:40 14:47 15:49 16:19


 


Temp  97.8  





  97.8  


 


Pulse  92  


 


Resp  20 18 


 


B/P (MAP)  106/56 (73)  


 


Pulse Ox 96 96  96


 


O2 Delivery Room Air Room Air Room Air Room Air


 


O2 Flow Rate 2.0   2.0


 


    





    





 5/22/17 5/22/17 5/22/17 5/22/17





 18:21 19:00 19:21 20:00


 


Temp  97.7  





  97.7  


 


Pulse  92  


 


Resp  20  


 


B/P (MAP)  105/56 (72)  


 


Pulse Ox 96 97 96 


 


O2 Delivery Room Air  Room Air Room Air


 


O2 Flow Rate 2.0  2.0 


 


    





    





 5/22/17 5/22/17 5/22/17 5/23/17





 20:25 20:55 23:00 03:00


 


Temp   99.4 99.3





   99.4 99.3


 


Pulse   89 90


 


Resp   18 18


 


B/P (MAP)   111/65 (80) 151/76 (101)


 


Pulse Ox 96 96 95 93


 


O2 Delivery Room Air Room Air  


 


O2 Flow Rate 2.0 2.0  


 


    





    





 5/23/17 5/23/17 5/23/17 





 03:07 05:15 07:00 


 


Temp   98.0 





   98.0 


 


Pulse   90 


 


Resp 20  18 


 


B/P (MAP)   113/71 (85) 


 


Pulse Ox 92 92 91 


 


O2 Delivery Room Air Room Air Room Air 


 


O2 Flow Rate  2.0  














Intake and Output   


 


 5/22/17 5/22/17 5/23/17





 15:00 23:00 07:00


 


Intake Total 500 ml 900 ml 


 


Output Total 100 ml  500 ml


 


Balance 400 ml 900 ml -500 ml

















KIESHA NUNO MD May 23, 2017 08:51

## 2017-05-23 NOTE — PDOC
BRIEF OPERATIVE NOTE


Date:  May 23, 2017


Pre-Op Diagnosis


peritonitis 2/2 non functioning PD catheter


Post-Op Diagnosis


same


Procedure Performed


Dx l/s, removal PD catheter


Surgeon


Gutierrez


Anesthesia Type:  General


Blood Loss


50cc


IV Fluid


400cc


Urine Output


200cc


Specimens Obtained


none


Findings


peritonitis, pig tail of catheter in pelvis surrounded by small bowel and 

omentum


Complications


none











EDI THOMASON MD May 23, 2017 16:14

## 2017-05-23 NOTE — PDOC
Renal-Progress Notes


Subjective Notes


Notes


FEELING BETTER





History of Present Illness


Hx of present illness


IMPROVED





Vitals


Vitals





Vital Signs








  Date Time  Temp Pulse Resp B/P (MAP) Pulse Ox O2 Delivery O2 Flow Rate FiO2


 


5/23/17 11:03     93 Room Air 2.0 


 


5/23/17 10:40 99.3 91 18 112/67 (82)    





 99.3       








Weight


Weight [ ]





I.O.


Intake and Output











Intake and Output 


 


 5/23/17





 07:00


 


Intake Total 1400 ml


 


Output Total 600 ml


 


Balance 800 ml


 


 


 


Intake Oral 1350 ml


 


IV Total 50 ml


 


Output Urine Total 600 ml


 


# Voids 1











Labs


Labs





Laboratory Tests








Test


  5/22/17


16:30 5/22/17


20:52 5/23/17


03:20 5/23/17


07:26


 


Glucose (Fingerstick)


  124 mg/dL


(70-99) 169 mg/dL


(70-99) 


  162 mg/dL


(70-99)


 


White Blood Count


  


  


  14.4 x10^3/uL


(4.0-11.0) 


 


 


Red Blood Count


  


  


  3.27 x10^6/uL


(4.30-5.70) 


 


 


Hemoglobin


  


  


  10.7 g/dL


(13.0-17.5) 


 


 


Hematocrit


  


  


  32.1 %


(39.0-53.0) 


 


 


Mean Corpuscular Volume   98 fL ()  


 


Mean Corpuscular Hemoglobin   33 pg (25-35)  


 


Mean Corpuscular Hemoglobin


Concent 


  


  33 g/dL


(31-37) 


 


 


Red Cell Distribution Width


  


  


  15.6 %


(11.5-14.5) 


 


 


Platelet Count


  


  


  218 x10^3/uL


(140-400) 


 


 


Sodium Level


  


  


  138 mmol/L


(136-145) 


 


 


Potassium Level


  


  


  4.1 mmol/L


(3.5-5.1) 


 


 


Chloride Level


  


  


  99 mmol/L


() 


 


 


Carbon Dioxide Level


  


  


  26 mmol/L


(21-32) 


 


 


Anion Gap   13 (6-14)  


 


Blood Urea Nitrogen


  


  


  77 mg/dL


(8-26) 


 


 


Creatinine


  


  


  7.3 mg/dL


(0.7-1.3) 


 


 


Estimated GFR


(Cockcroft-Gault) 


  


  9.1 


  


 


 


Glucose Level


  


  


  142 mg/dL


(70-99) 


 


 


Calcium Level


  


  


  8.7 mg/dL


(8.5-10.1) 


 


 


Test


  5/23/17


11:14 


  


  


 


 


Glucose (Fingerstick)


  139 mg/dL


(70-99) 


  


  


 











Review of Systems


Constitutional:  yes: alert, oriented


Ears/Nose/Throat:  Yes: no symptom reported


Eyes:  Yes: no symptom reported


Pulmonary:  Yes no symptom reported


Cardiovascular:  Yes no symptom reported


Gastrointestional:  Yes: abdominal pain


Musculoskeletal:  Yes: muscle stiffness


Skin:  Yes no symptom reported


Psychiatric/Neurological:  Yes: no symptom reported





Physical Exam


General Appearance:  no apparent distress


Skin:  warm


Respiratory:  bilateral CTA


Heart:  S1S2


Abdomen:  soft, bowel sounds present


Extremities:  pulses present


Neurology:  alert





Assessment


Assessment


IMP





ESRD


NON FUNCTIONING PD CATHETER


ANEMIA


LEUCOCYTOSIS


POSSIBLE PERITONITIS





PLAN





ANTIBIOTICS PER ID


PD CATHETER REPOSITIONING OR PROB REMOVAL TODAY


TUNNELED HD CATHETER TODAY


WILL PLAN FOR HD TOMORROW 


WILL HAVE HIM RESUME PD AT LATER DATE WHEN FEASIBLE


WILL HAVE SW SET UP OP HD AT [facility]











LILA GONZALEZ MD May 23, 2017 11:31

## 2017-05-24 VITALS — SYSTOLIC BLOOD PRESSURE: 119 MMHG | DIASTOLIC BLOOD PRESSURE: 69 MMHG

## 2017-05-24 VITALS — DIASTOLIC BLOOD PRESSURE: 60 MMHG | SYSTOLIC BLOOD PRESSURE: 114 MMHG

## 2017-05-24 VITALS — DIASTOLIC BLOOD PRESSURE: 53 MMHG | SYSTOLIC BLOOD PRESSURE: 113 MMHG

## 2017-05-24 VITALS — SYSTOLIC BLOOD PRESSURE: 120 MMHG | DIASTOLIC BLOOD PRESSURE: 65 MMHG

## 2017-05-24 VITALS — SYSTOLIC BLOOD PRESSURE: 116 MMHG | DIASTOLIC BLOOD PRESSURE: 61 MMHG

## 2017-05-24 VITALS — DIASTOLIC BLOOD PRESSURE: 73 MMHG | SYSTOLIC BLOOD PRESSURE: 131 MMHG

## 2017-05-24 VITALS — SYSTOLIC BLOOD PRESSURE: 100 MMHG | DIASTOLIC BLOOD PRESSURE: 58 MMHG

## 2017-05-24 VITALS — DIASTOLIC BLOOD PRESSURE: 44 MMHG | SYSTOLIC BLOOD PRESSURE: 104 MMHG

## 2017-05-24 VITALS — SYSTOLIC BLOOD PRESSURE: 155 MMHG | DIASTOLIC BLOOD PRESSURE: 72 MMHG

## 2017-05-24 RX ADMIN — MORPHINE SULFATE PRN MG: 4 INJECTION, SOLUTION INTRAMUSCULAR; INTRAVENOUS at 20:15

## 2017-05-24 RX ADMIN — SIMVASTATIN SCH MG: 10 TABLET, FILM COATED ORAL at 20:14

## 2017-05-24 RX ADMIN — INSULIN ASPART SCH UNITS: 100 INJECTION, SOLUTION INTRAVENOUS; SUBCUTANEOUS at 18:13

## 2017-05-24 RX ADMIN — PIPERACILLIN SODIUM AND TAZOBACTAM SODIUM SCH MLS/HR: 2; .25 INJECTION, POWDER, LYOPHILIZED, FOR SOLUTION INTRAVENOUS at 06:06

## 2017-05-24 RX ADMIN — SEVELAMER CARBONATE SCH MG: 800 TABLET, FILM COATED ORAL at 08:00

## 2017-05-24 RX ADMIN — FUROSEMIDE SCH MG: 80 TABLET ORAL at 18:11

## 2017-05-24 RX ADMIN — PIPERACILLIN SODIUM AND TAZOBACTAM SODIUM SCH MLS/HR: 2; .25 INJECTION, POWDER, LYOPHILIZED, FOR SOLUTION INTRAVENOUS at 18:12

## 2017-05-24 RX ADMIN — BACITRACIN SCH MLS/HR: 5000 INJECTION, POWDER, FOR SOLUTION INTRAMUSCULAR at 14:15

## 2017-05-24 RX ADMIN — CALCITRIOL SCH MCG: 0.25 CAPSULE, LIQUID FILLED ORAL at 09:00

## 2017-05-24 RX ADMIN — INSULIN ASPART SCH UNITS: 100 INJECTION, SOLUTION INTRAVENOUS; SUBCUTANEOUS at 07:54

## 2017-05-24 RX ADMIN — SEVELAMER CARBONATE SCH MG: 800 TABLET, FILM COATED ORAL at 18:11

## 2017-05-24 RX ADMIN — INSULIN ASPART SCH UNITS: 100 INJECTION, SOLUTION INTRAVENOUS; SUBCUTANEOUS at 12:59

## 2017-05-24 RX ADMIN — PIPERACILLIN SODIUM AND TAZOBACTAM SODIUM SCH MLS/HR: 2; .25 INJECTION, POWDER, LYOPHILIZED, FOR SOLUTION INTRAVENOUS at 23:15

## 2017-05-24 RX ADMIN — OXYCODONE HYDROCHLORIDE AND ACETAMINOPHEN PRN TAB: 5; 325 TABLET ORAL at 18:12

## 2017-05-24 RX ADMIN — SEVELAMER CARBONATE SCH MG: 800 TABLET, FILM COATED ORAL at 12:00

## 2017-05-24 RX ADMIN — FUROSEMIDE SCH MG: 80 TABLET ORAL at 09:00

## 2017-05-24 RX ADMIN — MORPHINE SULFATE PRN MG: 4 INJECTION, SOLUTION INTRAMUSCULAR; INTRAVENOUS at 09:58

## 2017-05-24 RX ADMIN — MORPHINE SULFATE PRN MG: 2 INJECTION, SOLUTION INTRAMUSCULAR; INTRAVENOUS at 02:50

## 2017-05-24 RX ADMIN — Medication SCH TAB: at 09:00

## 2017-05-24 RX ADMIN — POLYETHYLENE GLYCOL 3350 SCH GM: 17 POWDER, FOR SOLUTION ORAL at 09:00

## 2017-05-24 NOTE — PDOC
Exam








Chris





Assistant


Assistant


B Cates





Pre-Procedure Diagnosis


Pre-Procedure Diagnosis


ESRD.  Nonfunctioning PDC removed due to peritonitis.  HD now needed.  Tunneled 

HDC insertion requested by Renal.





Post-Procedure Diagnosis


Post-Procedure Diagnosis


Same





Procedure Performed


Procedure Performed


Sono/fluoro guided tunneled HDC placement





Type of Anesthesia


Type of Anesthesia


Local + Mod sedation





Estimated Blood Loss


EBL:


Minimal





Drain/Tubes


Drains/Tubes


15.5F 28cm rt IJ tunneled HDC





Condition of Patient


Condition of Patient


Stable.  No apparent complication.





Disposition


Disposition


From IR return to [phone] for recovery.  F/u with Renal.  OK to use tunneled HDC.  

Full report to follow.











ROSA BAUTISTA MD May 24, 2017 12:05

## 2017-05-24 NOTE — PDOC
Renal-Progress Notes


Subjective Notes


Notes


FEELS WELL





History of Present Illness


Hx of present illness


STABLE





Vitals


Vitals





Vital Signs








  Date Time  Temp Pulse Resp B/P (MAP) Pulse Ox O2 Delivery O2 Flow Rate FiO2


 


5/24/17 09:58      Room Air  


 


5/24/17 07:00 97.8 75 18 113/53 (73) 96   





 97.8       


 


5/24/17 03:30       2.0 








Weight


Weight [ ]





I.O.


Intake and Output











Intake and Output 


 


 5/24/17





 07:00


 


Intake Total 1000 ml


 


Output Total 250 ml


 


Balance 750 ml


 


 


 


Intake Oral 400 ml


 


IV Total 600 ml


 


Output Urine Total 200 ml


 


Estimated Blood Loss 50 ml











Labs


Labs





Laboratory Tests








Test


  5/23/17


11:14 5/23/17


16:22 5/23/17


20:06 5/24/17


07:21


 


Glucose (Fingerstick)


  139 mg/dL


(70-99) 117 mg/dL


(70-99) 219 mg/dL


(70-99) 274 mg/dL


(70-99)











Review of Systems


Constitutional:  yes: alert, oriented


Ears/Nose/Throat:  Yes: no symptom reported


Eyes:  Yes: no symptom reported


Pulmonary:  Yes no symptom reported


Cardiovascular:  Yes no symptom reported


Gastrointestional:  Yes: abdominal pain


Musculoskeletal:  Yes: muscle stiffness


Skin:  Yes no symptom reported


Psychiatric/Neurological:  Yes: no symptom reported





Physical Exam


General Appearance:  no apparent distress


Skin:  warm


Respiratory:  bilateral CTA


Heart:  S1S2


Abdomen:  soft, bowel sounds present


Extremities:  pulses present


Neurology:  alert





Assessment


Assessment


IMP





ESRD


NON FUNCTIONING PD CATHETER


S/P PD CATHETER REMOVAL


ANEMIA


LEUCOCYTOSIS


POSSIBLE PERITONITIS


ABD PAIN RESOLVED





PLAN





ANTIBIOTICS PER ID


TUNNELED HD CATHETER TODAY


HD TODAY


UF TO DW


WILL HAVE HIM RESUME PD AT LATER DATE WHEN FEASIBLE


SW TO SET UP OP HD AT [facility]











LILA GONZALEZ MD May 24, 2017 10:23

## 2017-05-24 NOTE — PDOC
PROGRESS NOTES


Subjective


Subjective


Patient reports abdominal pain is much better, hasn't taken any pain medication 

since last night.





Objective


Objective





Vital Signs








  Date Time  Temp Pulse Resp B/P (MAP) Pulse Ox O2 Delivery O2 Flow Rate FiO2


 


5/24/17 07:00 97.8 75 18 113/53 (73) 96 Room Air  





 97.8       


 


5/24/17 03:30       2.0 














Intake and Output 


 


 5/24/17





 06:59


 


Intake Total 1000 ml


 


Output Total 250 ml


 


Balance 750 ml


 


 


 


Intake Oral 400 ml


 


IV Total 600 ml


 


Output Urine Total 200 ml


 


Estimated Blood Loss 50 ml











Physical Exam


Abdomen:  Normal bowel sounds, Soft, No tenderness


Heart:  Regular rate


Extremities:  Other (chronic severe lymphedema)


General:  Alert, Oriented X3, No acute distress


Lungs:  Clear to auscultation





Assessment


Assessment


Problems


Medical Problems:


(1) Abdominal pain


Status: Acute  





(2) ESRD (end stage renal disease) on dialysis


Status: Acute  





(3) Fluid overload


Status: Acute  





(4) Peripheral edema


Status: Acute  











Plan


Plan of Care


1. Peritonitis with abdominal pain - symptoms are much improved, continue Zosyn 

per ID.


2. ESRD - non-functioning peritoneal catheter removed yesterday. Patient 

waiting for new temporary hemodialysis catheter today, to start hemodialysis 

after catheter placement.


3. DM2 - FSBG elevated this AM, had been well controlled with diet. Continue SS 

insulin.


4. mild CHF - stable, continue his usual Lasix.





Comment


Review of Relevant


I have reviewed the following items henry (where applicable) has been applied.


Labs





Laboratory Tests








Test


  5/22/17


10:33 5/22/17


16:30 5/22/17


20:52 5/23/17


03:20


 


Glucose (Fingerstick)


  156 mg/dL


(70-99) 124 mg/dL


(70-99) 169 mg/dL


(70-99) 


 


 


White Blood Count


  


  


  


  14.4 x10^3/uL


(4.0-11.0)


 


Red Blood Count


  


  


  


  3.27 x10^6/uL


(4.30-5.70)


 


Hemoglobin


  


  


  


  10.7 g/dL


(13.0-17.5)


 


Hematocrit


  


  


  


  32.1 %


(39.0-53.0)


 


Mean Corpuscular Volume    98 fL () 


 


Mean Corpuscular Hemoglobin    33 pg (25-35) 


 


Mean Corpuscular Hemoglobin


Concent 


  


  


  33 g/dL


(31-37)


 


Red Cell Distribution Width


  


  


  


  15.6 %


(11.5-14.5)


 


Platelet Count


  


  


  


  218 x10^3/uL


(140-400)


 


Sodium Level


  


  


  


  138 mmol/L


(136-145)


 


Potassium Level


  


  


  


  4.1 mmol/L


(3.5-5.1)


 


Chloride Level


  


  


  


  99 mmol/L


()


 


Carbon Dioxide Level


  


  


  


  26 mmol/L


(21-32)


 


Anion Gap    13 (6-14) 


 


Blood Urea Nitrogen


  


  


  


  77 mg/dL


(8-26)


 


Creatinine


  


  


  


  7.3 mg/dL


(0.7-1.3)


 


Estimated GFR


(Cockcroft-Gault) 


  


  


  9.1 


 


 


Glucose Level


  


  


  


  142 mg/dL


(70-99)


 


Calcium Level


  


  


  


  8.7 mg/dL


(8.5-10.1)


 


Test


  5/23/17


07:26 5/23/17


11:14 5/23/17


16:22 5/23/17


20:06


 


Glucose (Fingerstick)


  162 mg/dL


(70-99) 139 mg/dL


(70-99) 117 mg/dL


(70-99) 219 mg/dL


(70-99)


 


Test


  5/24/17


07:21 


  


  


 


 


Glucose (Fingerstick)


  274 mg/dL


(70-99) 


  


  


 








Laboratory Tests








Test


  5/23/17


11:14 5/23/17


16:22 5/23/17


20:06 5/24/17


07:21


 


Glucose (Fingerstick)


  139 mg/dL


(70-99) 117 mg/dL


(70-99) 219 mg/dL


(70-99) 274 mg/dL


(70-99)








Medications





Current Medications


Iohexol (Omnipaque 300 Mg/ml) 50 ml 1X  ONCE IJ  Last administered on 5/21/17at 

10:15;  Start 5/21/17 at 10:00;  Stop 5/21/17 at 10:01;  Status DC


Iohexol (Omnipaque 240 Mg/ml) 50 ml 1X  ONCE PO  Last administered on 5/21/17at 

10:00;  Start 5/21/17 at 10:00;  Stop 5/21/17 at 10:09;  Status DC


Ondansetron HCl (Zofran) 4 mg 1X  ONCE IV  Last administered on 5/21/17at 11:20

;  Start 5/21/17 at 11:15;  Stop 5/21/17 at 11:16;  Status DC


Morphine Sulfate 10 mg STK-MED ONCE .ROUTE ;  Start 5/21/17 at 11:08;  Stop 5/21 /17 at 11:09;  Status DC


Morphine Sulfate 5 mg 1X  ONCE IV  Last administered on 5/21/17at 11:20;  Start 

5/21/17 at 11:15;  Stop 5/21/17 at 11:16;  Status DC


Ondansetron HCl (Zofran) 4 mg 1X  ONCE IV  Last administered on 5/21/17at 12:02

;  Start 5/21/17 at 12:00;  Stop 5/21/17 at 12:01;  Status DC


Hydromorphone HCl (Dilaudid) 2 mg STK-MED ONCE .ROUTE ;  Start 5/21/17 at 13:14

;  Stop 5/21/17 at 13:15;  Status DC


Hydromorphone HCl (Dilaudid) 1 mg 1X  ONCE IV  Last administered on 5/21/17at 13

:19;  Start 5/21/17 at 13:30;  Stop 5/21/17 at 13:31;  Status DC


Ondansetron HCl (Zofran) 4 mg PRN Q8HRS  PRN IV NAUSEA/VOMITING;  Start 5/21/17 

at 14:30;  Stop 5/22/17 at 14:29;  Status DC


Fentanyl Citrate (Fentanyl 2ml Vial) 50 mcg PRN Q1HR  PRN IV PAIN Last 

administered on 5/22/17at 13:09;  Start 5/21/17 at 14:30;  Stop 5/22/17 at 14:29

;  Status DC


Insulin Aspart (NovoLOG) 0-7 UNITS TIDWMEALS SQ  Last administered on 5/24/17at 

07:54;  Start 5/21/17 at 17:00


Dextrose (Dextrose 50%-Water Syringe) 12.5 gm PRN Q15MIN  PRN IV SEE COMMENTS;  

Start 5/21/17 at 16:00


Oxycodone/ Acetaminophen (Percocet 5/325) 1 tab PRN Q6HRS  PRN PO PAIN Last 

administered on 5/22/17at 10:56;  Start 5/21/17 at 16:00


Polyethylene Glycol (miraLAX PACKET) 17 gm DAILY PO  Last administered on 5/23/ 17at 17:37;  Start 5/22/17 at 09:00


Bisacodyl (Dulcolax Tab) 10 mg PRN DAILY  PRN PO CONSTIPATION;  Start 5/21/17 

at 21:00


Vitamin B Complex/ Vitamin C (Winsome-Samir) 1 tab DAILY PO  Last administered on 5/ 23/17at 17:38;  Start 5/22/17 at 09:00


Furosemide (Lasix) 80 mg BID94 PO  Last administered on 5/23/17at 17:37;  Start 

5/22/17 at 09:00


Sevelamer Carbonate (Renvela) 400 mg TIDWMEALS PO  Last administered on 5/23/ 17at 17:38;  Start 5/22/17 at 09:00


Simvastatin (Zocor) 10 mg QHS PO  Last administered on 5/23/17at 20:16;  Start 5 /22/17 at 21:00


Calcitriol (Rocaltrol) 0.5 mcg DAILY PO  Last administered on 5/23/17at 17:38;  

Start 5/22/17 at 09:00


Piperacillin Sod/ Tazobactam Sod 2.25 gm/Sodium Chloride 50 ml @  100 mls/hr 

Q8HRS IV  Last administered on 5/24/17at 06:06;  Start 5/22/17 at 10:00


Info (PHARMACY MONITORING -- do not chart) 4 each Q6HRS MC ;  Start 5/22/17 at 

12:00;  Status Cancel


Peritoneal Dialysis Solution 2,000 ml @  500 mls/hr Q4HRS IP  Last administered 

on 5/22/17at 12:41;  Start 5/22/17 at 12:00;  Stop 5/22/17 at 15:12;  Status DC


Iohexol (Omnipaque 300 Mg/ml) 50 ml STK-MED ONCE .ROUTE ;  Start 5/22/17 at 14:

27;  Stop 5/22/17 at 14:28;  Status DC


Iohexol (Omnipaque 300 Mg/ml) 50 ml 1X  ONCE INT CAT  Last administered on 5/22/ 17at 14:58;  Start 5/22/17 at 15:00;  Stop 5/22/17 at 15:01;  Status DC


Morphine Sulfate 2 mg PRN Q4HRS  PRN IV SEVERE PAIN Last administered on 5/24/ 17at 02:50;  Start 5/22/17 at 15:45


Morphine Sulfate 3 mg PRN Q4HRS  PRN IV SEVERE PAIN;  Start 5/22/17 at 16:00


Morphine Sulfate 4 mg PRN Q4HRS  PRN IV SEVERE PAIN Last administered on 5/23/ 17at 22:32;  Start 5/22/17 at 16:00


Morphine Sulfate 5 mg PRN Q4HRS  PRN IV SEVERE PAIN;  Start 5/22/17 at 16:00


Cefazolin Sodium/ Dextrose 50 ml @  100 mls/hr 1X PREOP  PRN IV prophylaxis 

Last administered on 5/23/17at 15:25;  Start 5/23/17 at 06:00;  Stop 5/23/17 at 

18:00;  Status DC


Oxycodone/ Acetaminophen (Percocet 5/325) 2 tab PRN Q6HRS  PRN PO PAIN Last 

administered on 5/23/17at 20:16;  Start 5/22/17 at 18:30


Dexamethasone Sodium Phosphate (Decadron) 20 mg STK-MED ONCE .ROUTE ;  Start 5/ 23/17 at 12:21;  Stop 5/23/17 at 12:22;  Status DC


Ondansetron HCl (Zofran) 4 mg STK-MED ONCE .ROUTE ;  Start 5/23/17 at 12:21;  

Stop 5/23/17 at 12:22;  Status DC


Propofol 20 ml @ As Directed STK-MED ONCE IV ;  Start 5/23/17 at 12:21;  Stop 5/ 23/17 at 12:22;  Status DC


Lidocaine HCl (Lidocaine Pf 2% Vial) 5 ml STK-MED ONCE .ROUTE ;  Start 5/23/17 

at 12:21;  Stop 5/23/17 at 12:22;  Status DC


Midazolam HCl (Versed) 2 mg STK-MED ONCE .ROUTE ;  Start 5/23/17 at 12:21;  

Stop 5/23/17 at 12:22;  Status DC


Fentanyl Citrate (Fentanyl 2ml Vial) 100 mcg STK-MED ONCE .ROUTE ;  Start 5/23/ 17 at 12:22;  Stop 5/23/17 at 12:23;  Status DC


Rocuronium Bromide (Zemuron) 50 mg STK-MED ONCE .ROUTE ;  Start 5/23/17 at 12:22

;  Stop 5/23/17 at 12:23;  Status DC


Neomycin/ Polymyxin/ Bacitracin (Triple Antibiotic Ointment) 1 pkt STK-MED ONCE 

TP ;  Start 5/23/17 at 12:54;  Stop 5/23/17 at 12:55;  Status DC


Bupivacaine HCl/ Epinephrine Bitart (Sensorcain-Mpf Epi 0.5%-1:[id number]) 30 ml STK

-MED ONCE .ROUTE  Last administered on 5/23/17at 15:31;  Start 5/23/17 at 12:54

;  Stop 5/23/17 at 12:55;  Status DC


Sodium Chloride 1,000 ml @  30 mls/hr Q24H IV  Last administered on 5/23/17at 14

:15;  Start 5/23/17 at 14:15


Phenylephrine HCl 1 mg STK-MED ONCE IV ;  Start 5/23/17 at 15:24;  Stop 5/23/17 

at 15:25;  Status DC


Desflurane (Suprane) 30 ml STK-MED ONCE IH ;  Start 5/23/17 at 15:43;  Stop 5/23 /17 at 15:44;  Status DC


Propofol 20 ml @ As Directed STK-MED ONCE IV ;  Start 5/23/17 at 15:43;  Stop 5/ 23/17 at 15:44;  Status DC


Lidocaine HCl (Lidocaine Pf 2% Vial) 5 ml STK-MED ONCE .ROUTE ;  Start 5/23/17 

at 15:43;  Stop 5/23/17 at 15:44;  Status DC


Ondansetron HCl (Zofran) 4 mg STK-MED ONCE .ROUTE ;  Start 5/23/17 at 15:43;  

Stop 5/23/17 at 15:44;  Status DC


Famotidine (Pepcid) 20 mg STK-MED ONCE .ROUTE ;  Start 5/23/17 at 15:44;  Stop 5 /23/17 at 15:45;  Status DC


Phenylephrine HCl 1 mg STK-MED ONCE IV ;  Start 5/23/17 at 15:44;  Stop 5/23/17 

at 15:45;  Status DC


Glycopyrrolate (Robinul) 1 mg STK-MED ONCE .ROUTE ;  Start 5/23/17 at 15:54;  

Stop 5/23/17 at 15:55;  Status DC


Neostigmine Methylsulfate 5 mg STK-MED ONCE .ROUTE ;  Start 5/23/17 at 15:56;  

Stop 5/23/17 at 15:57;  Status DC


Fentanyl Citrate (Fentanyl 2ml Vial) 100 mcg STK-MED ONCE .ROUTE ;  Start 5/23/ 17 at 16:10;  Stop 5/23/17 at 16:11;  Status DC


Prochlorperazine Edisylate (Compazine) 10 mg STK-MED ONCE .ROUTE ;  Start 5/23/ 17 at 16:24;  Stop 5/23/17 at 16:25;  Status DC


Fentanyl Citrate (Fentanyl 2ml Vial) 100 mcg STK-MED ONCE .ROUTE ;  Start 5/23/ 17 at 16:24;  Stop 5/23/17 at 16:25;  Status DC


Ondansetron HCl (Zofran) 4 mg PRN Q6HRS  PRN IV NAUSEA/VOMITING;  Start 5/23/17 

at 16:30;  Stop 5/24/17 at 16:29


Fentanyl Citrate (Fentanyl 2ml Vial) 25 mcg PRN Q5MIN  PRN IV MILD PAIN;  Start 

5/23/17 at 16:30;  Stop 5/24/17 at 16:29


Fentanyl Citrate (Fentanyl 2ml Vial) 50 mcg PRN Q5MIN  PRN IV MODERATE PAIN;  

Start 5/23/17 at 16:30;  Stop 5/24/17 at 16:29


Morphine Sulfate 1 mg PRN Q10MIN  PRN IV SEVERE PAIN;  Start 5/23/17 at 16:30;  

Stop 5/24/17 at 16:29


Ringer's Solution 1,000 ml @  0 mls/hr Q0M IV ;  Start 5/23/17 at 16:29;  Stop 5 /24/17 at 04:28;  Status DC


Lidocaine HCl 2 ml PRN 1X  PRN ID PRIOR TO IV START;  Start 5/23/17 at 16:30;  

Stop 5/24/17 at 16:29


Hydromorphone HCl (Dilaudid) 0.5 mg PRN Q10MIN  PRN IV SEV PAIN, Second choice;

  Start 5/23/17 at 16:30;  Stop 5/24/17 at 16:29


Prochlorperazine Edisylate (Compazine) 5 mg PACU PRN  PRN IV NAUSEA, MRX1 Last 

administered on 5/23/17at 16:31;  Start 5/23/17 at 16:30;  Stop 5/24/17 at 16:29





Active Scripts


Active


Calcitriol 0.5 Mcg Capsule 1 Cap PO DAILY


Reported


Nephro-Samir Tablet (Folic Acid/Vitamin B Comp W-C) 0.8 Mg Tablet 1 Tab PO DAILY


Lasix (Furosemide) 80 Mg Tablet 1 Tab PO BID


Simvastatin 10 Mg Tablet 1 Tab PO QHS


Renvela (Sevelamer Carbonate) 800 Mg Tablet 0.5 Tab PO TID


Percocet 5-325 Mg Tablet (Oxycodone/Acetaminophen) 1 Each Tablet 1 Tab PO Q4HRS 

PRN


     dose taken __________,  next dose may be taken at __________


Vitals/I & O





Vital Sign - Last 24 Hours








 5/23/17 5/23/17 5/23/17 5/23/17





 10:40 11:03 13:11 14:01


 


Temp 99.3   97.9





 99.3   97.9


 


Pulse 91   79


 


Resp 18   22


 


B/P (MAP) 112/67 (82)   119/64


 


Pulse Ox 93 93 93 96


 


O2 Delivery Room Air Room Air Room Air Room Air


 


O2 Flow Rate  2.0 2.0 2.0


 


    





    





 5/23/17 5/23/17 5/23/17 5/23/17





 16:13 16:13 16:28 16:43


 


Temp  97.0  





  97.0  


 


Pulse  82 80 80


 


Resp  16 18 18


 


B/P (MAP)  124/66 122/65 128/61


 


Pulse Ox  99 99 93


 


O2 Delivery Mask Simple Mask Simple Mask Nasal Cannula


 


O2 Flow Rate 10 10 10 3


 


    





    





 5/23/17 5/23/17 5/23/17 5/23/17





 16:58 17:08 17:13 17:30


 


Temp    98.0





    98.0


 


Pulse 80  78 82


 


Resp 16  16 18


 


B/P (MAP) 122/65  122/62 120/67 (84)


 


Pulse Ox 94  95 92


 


O2 Delivery Nasal Cannula Nasal Cannula Nasal Cannula Nasal Cannula


 


O2 Flow Rate 3 3 3 3.0


 


    





    





 5/23/17 5/23/17 5/23/17 5/23/17





 17:45 18:00 18:29 18:30


 


Pulse 88 90  86


 


B/P (MAP) 147/82 (103) 142/79 (100)  152/69 (96)


 


O2 Delivery   Nasal Cannula 





 5/23/17 5/23/17 5/23/17 5/23/17





 20:00 20:16 21:00 21:30


 


Pulse   76 


 


Resp  16  17


 


B/P (MAP)   120/67 (84) 


 


Pulse Ox    92


 


O2 Delivery Nasal Cannula Nasal Cannula  Nasal Cannula


 


O2 Flow Rate 2.0 2.0  2.0





 5/23/17 5/23/17 5/23/17 5/23/17





 22:00 22:32 22:35 23:30


 


Temp   98.3 





   98.3 


 


Pulse 77  81 


 


Resp  16 18 17


 


B/P (MAP) 135/77 (96)  117/61 (79) 


 


Pulse Ox  92 95 95


 


O2 Delivery  Nasal Cannula  Nasal Cannula


 


O2 Flow Rate  2.0  2.0


 


    





    





 5/24/17 5/24/17 5/24/17 





 02:50 03:30 07:00 


 


Temp   97.8 





   97.8 


 


Pulse   75 


 


Resp 17 18 18 


 


B/P (MAP)   113/53 (73) 


 


Pulse Ox 95 95 96 


 


O2 Delivery Nasal Cannula Nasal Cannula Room Air 


 


O2 Flow Rate 2.0 2.0  














Intake and Output   


 


 5/23/17 5/23/17 5/24/17





 14:59 22:59 06:59


 


Intake Total  600 ml 400 ml


 


Output Total  250 ml 


 


Balance  350 ml 400 ml

















KIESHA NUNO MD May 24, 2017 09:05

## 2017-05-24 NOTE — OP
DATE OF SURGERY:  05/23/2017



PREOPERATIVE DIAGNOSIS:  Peritonitis secondary to nonfunctional PD catheter.



POSTOPERATIVE DIAGNOSIS:  Peritonitis secondary to nonfunctional PD catheter.



PROCEDURE:  Diagnostic laparoscopy and removal of peritoneal dialysis catheter.



SURGEON:  Edi Thomason M.D.



ANESTHESIA:  General endotracheal.



ESTIMATED BLOOD LOSS:  50 mL.



IV FLUID:  400 mL.



URINE OUTPUT:  200 mL.



INDICATIONS:  The patient is a 66-year-old gentleman with end-stage renal

disease, previously on peritoneal dialysis.  The last 4 or 5 days, his catheter

had been nonfunctional.  He is brought for laparoscopy for repositioning or

removal.



OPERATIVE FINDINGS:  There were multiple loops of small bowel loosely adherent

to the abdominal wall with purulent material suggestive of peritonitis.  The

catheter resided in the true pelvis enveloped by the loops of bowel.



DESCRIPTION OF PROCEDURE:  The patient brought to the operating suite, given

general endotracheal anesthetic.  Altman catheter placed to dependent drainage

and the abdomen prepped and draped in the usual sterile fashion.  An epigastric

incision was infiltrated with local anesthetic, sharply incised and a 5 mm

Visiport used to gain access into the abdominal cavity.  Pneumoperitoneum was

established.  No evidence of injury to the intra-abdominal contents was seen.



Under direct vision, the left lower quadrant port site was placed and using a

DeBakey laparoscopic instrument, we gently swept the small bowel off the

abdominal wall to expose the catheter.  We then traced the catheter down to its

pigtail ____ pelvis, again taking down omental and bowel adhesions gently to

assure that the catheter resided outside the bowel.  Once we had visualized the

catheter in its entirety, the abdomen was decompressed.



Local anesthetic was infiltrated over the insertion site of the catheter. 

Incision made, dissection carried down to the Dacron cuff, which was freed from

the surrounding structures, allowed delivery of the catheter without difficulty.

 The small remaining rent in the fascia was closed with a single stitch of 0

Vicryl.



We then infiltrated the access site with local anesthetic, sharply excised over

the subcutaneous cuff, freed it and delivered the catheter.



Wounds were closed loosely with skin staples.  Telfa bibi were placed in the

insertion site and access site wounds.  Sterile dressings applied.  Altman

catheter removed.  The patient was awakened from his anesthetic and taken to the

recovery room in satisfactory condition.

 



______________________________

EDI THOMASON MD



DR:  Suzanne  JOB#:  [id number] / [id number]

DD:  05/24/2017 12:03  DT:  05/24/2017 18:27

## 2017-05-24 NOTE — PDOC
Infectious Disease Note


Subjective


Subjective


pt feeling much better, no abd pain, cath is out





ROS


ROS


GEN: Denies fevers, chills, sweats


HEENT: Denies blurred vision, sore throat


CV: Denies chest pain


RESP: Denies shortness of air, cough


GI: Denies n/v/d


NEURO: Denies confusion, dizziness


MSK: Denies weakness, joint pain/swelling





Vital Sign


Vital Signs





Vital Signs








  Date Time  Temp Pulse Resp B/P (MAP) Pulse Ox O2 Delivery O2 Flow Rate FiO2


 


5/24/17 08:00      Room Air  


 


5/24/17 07:00 97.8 75 18 113/53 (73) 96   





 97.8       


 


5/24/17 03:30       2.0 











Physical Exam


PHYSICAL EXAM


GENERAL:  NAD, Alert


HEENT:  PERRL, OC/OP


NECK:  Supple, no JVD, no LN


LUNGS:  Clear


HEART:  S1S2, no gallop, no murmur


ABD:  Soft, NT, no organomegaly, no rebound


EXT:  No edema, no cyanosis


CNS:  Alert, oriented x 3, no focal neurologic deficit


SKIN:  No rash


IV: ok





Labs


Lab





Laboratory Tests








Test


  5/23/17


11:14 5/23/17


16:22 5/23/17


20:06 5/24/17


07:21


 


Glucose (Fingerstick)


  139 mg/dL


(70-99) 117 mg/dL


(70-99) 219 mg/dL


(70-99) 274 mg/dL


(70-99)











Objective


Assessment


Abdominal pain


PD cath mal function vs infection/peritonitis


ESRD


DM


Chronic lymphedema


CHF





Plan


Plan of Care





zosyn, soon to change to po augmentin for d/c 


d/w dr Rhett CRUZ,SALOME BEACH MD May 24, 2017 09:29

## 2017-05-24 NOTE — PDOC
MODERATE SEDATION ASSESSMENT


RISKS/ALTERNATIVES


Risks/Alternatives


Risks and alternatives of this type of sedation and procedure discussed with:


RISK/ALTERNATIVES:  Patient





H & P ON CHART


H & P


H & P on chart and reviewed for co-morbid conditions and appropriate labs.


H&P ON CHART:  Yes





PREGNANCY STATUS


PREG STATUS ASSESSED:  N/A





MEDS/ALLERGIES REVIEWED


Meds/Allergies Reviewed


Medications and Allergies including time and route of recently administered 

narcotics and sedatives.


MEDS/ALLERGIES REVIEWED:  Yes





ASA RATING


ASA RATING:  III





AIRWAY ASSESSMENT


Airway Assessment


Airway patency, oral function limitations, presence  of caps, crowns, dentures, 

partials, and ability to extend neck assessed.


AIRWAY ASSESSMENT:  Yes





MALLAMPATI SCORE


MALLAMPATI SCORE:  II





PRE-SEDATION ASSESSMENT


PRE-SEDATION ASSESSMENT:  Yes











ROSA BAUTISTA MD May 24, 2017 11:59

## 2017-05-25 VITALS — DIASTOLIC BLOOD PRESSURE: 58 MMHG | SYSTOLIC BLOOD PRESSURE: 110 MMHG

## 2017-05-25 VITALS — SYSTOLIC BLOOD PRESSURE: 116 MMHG | DIASTOLIC BLOOD PRESSURE: 66 MMHG

## 2017-05-25 VITALS — DIASTOLIC BLOOD PRESSURE: 69 MMHG | SYSTOLIC BLOOD PRESSURE: 111 MMHG

## 2017-05-25 LAB
ANION GAP SERPL CALC-SCNC: 6 MMOL/L (ref 6–14)
BUN SERPL-MCNC: 50 MG/DL (ref 8–26)
CALCIUM SERPL-MCNC: 8.2 MG/DL (ref 8.5–10.1)
CHLORIDE SERPL-SCNC: 102 MMOL/L (ref 98–107)
CO2 SERPL-SCNC: 34 MMOL/L (ref 21–32)
CREAT SERPL-MCNC: 5.1 MG/DL (ref 0.7–1.3)
ERYTHROCYTE [DISTWIDTH] IN BLOOD BY AUTOMATED COUNT: 15.5 % (ref 11.5–14.5)
GFR SERPLBLD BASED ON 1.73 SQ M-ARVRAT: 13.8 ML/MIN
GLUCOSE SERPL-MCNC: 203 MG/DL (ref 70–99)
HCT VFR BLD CALC: 25.6 % (ref 39–53)
HGB BLD-MCNC: 8.6 G/DL (ref 13–17.5)
MCH RBC QN AUTO: 33 PG (ref 25–35)
MCHC RBC AUTO-ENTMCNC: 34 G/DL (ref 31–37)
MCV RBC AUTO: 98 FL (ref 79–100)
PLATELET # BLD AUTO: 231 X10^3/UL (ref 140–400)
POTASSIUM SERPL-SCNC: 3.9 MMOL/L (ref 3.5–5.1)
RBC # BLD AUTO: 2.63 X10^6/UL (ref 4.3–5.7)
SODIUM SERPL-SCNC: 142 MMOL/L (ref 136–145)
WBC # BLD AUTO: 8 X10^3/UL (ref 4–11)

## 2017-05-25 RX ADMIN — OXYCODONE HYDROCHLORIDE AND ACETAMINOPHEN PRN TAB: 5; 325 TABLET ORAL at 09:38

## 2017-05-25 RX ADMIN — POLYETHYLENE GLYCOL 3350 SCH GM: 17 POWDER, FOR SOLUTION ORAL at 07:56

## 2017-05-25 RX ADMIN — Medication SCH TAB: at 07:56

## 2017-05-25 RX ADMIN — SEVELAMER CARBONATE SCH MG: 800 TABLET, FILM COATED ORAL at 07:56

## 2017-05-25 RX ADMIN — CALCITRIOL SCH MCG: 0.25 CAPSULE, LIQUID FILLED ORAL at 07:56

## 2017-05-25 RX ADMIN — PIPERACILLIN SODIUM AND TAZOBACTAM SODIUM SCH MLS/HR: 2; .25 INJECTION, POWDER, LYOPHILIZED, FOR SOLUTION INTRAVENOUS at 05:25

## 2017-05-25 RX ADMIN — FUROSEMIDE SCH MG: 80 TABLET ORAL at 07:56

## 2017-05-25 RX ADMIN — INSULIN ASPART SCH UNITS: 100 INJECTION, SOLUTION INTRAVENOUS; SUBCUTANEOUS at 08:02

## 2017-05-25 NOTE — DS
DATE OF DISCHARGE:  05/25/2017



CHIEF COMPLAINT:  Abdominal pain.



HISTORY OF PRESENT ILLNESS:  The patient is a 66-year-old male who is on

peritoneal dialysis at home for his end-stage renal disease.  He presented to

the Emergency Room with the above complaint.  The patient reported the onset of

some mild abdominal pain about 1-2 weeks prior to admission.  At first, he

attributed some of this discomfort to constipation.  He started taking MiraLax

daily as he had been advised to do at a recent visit in our office and had good

improvement in his constipation with this.  However, his abdominal pain

worsened.  He especially noticed it when he did his peritoneal dialysis, which

he continued to do daily as advised.  Several days prior to admission, he began

to get an error message during his dialysis, saying that the dialysis machine

could not withdraw the dialysis fluid.  He had increasing abdominal distention

and discomfort and so presented to the Emergency Room.  Initial evaluation there

showed a normal white blood count, a KUB with the injection of contrast showed

that the catheter was patent, but there was some mild kinking of the catheter

seen.  CT of the abdomen and pelvis did not show any focal infection or

abnormality.  The patient was admitted for further treatment.



HOSPITAL COURSE:  The patient was seen in consultation by Dr. Fraga, Dr. Aly Crook, Dr. Whitley and Dr. Perez.  Upon further testing, it was found that the

peritoneal dialysis catheter was not functioning at all and the patient was

unable to be dialyzed this way.  The patient underwent a diagnostic laparoscopy

and removal of the peritoneal dialysis catheter on 05/23/2017.  Evidence of

peritonitis was seen during that procedure.  On the day after admission, the

patient's white blood cell count had increased to 17.6, Dr. Aly Crook started

him on Zosyn for treatment of presumed acute bacterial peritonitis.  No

peritoneal fluid was available for culture.  The patient quickly improved with

the Zosyn and after the removal of the peritoneal catheter.  He remained

afebrile with stable vital signs.  His white blood cell count is now 8.0.  The

patient had placement of a temporary hemodialysis catheter and underwent

hemodialysis yesterday.  His lab is improved and arrangements are being made for

him to start outpatient hemodialysis on Friday.  The patient reports his

abdominal pain is much improved and he feels ready to go home.  His pain is now

controlled with oral pain medication.



The patient has a history of mild systolic congestive heart failure, this has

been stable with usual Lasix 80 mg twice daily.  He has diabetes mellitus type

2, which is basically diet controlled.  For him, he has Humalog insulin that he

uses rarely at home and he is advised to continue this.  He has been taking

MiraLax daily and feels that his bowel function has been good with this and he

will continue to do this after discharge.  He will be discharged home today on

Augmentin per Dr. Crook's recommendations.



FINAL DIAGNOSES:

1.  Acute bacterial peritonitis.

2.  End-stage renal disease, on dialysis.

3.  Diabetes mellitus type 2.

4.  Systolic congestive heart failure.



DISCHARGE MEDICATIONS:  Augmentin 875 one p.o. b.i.d. x 5 days, calcitriol 0.5

mcg 1 capsule daily, Nephro-Samir 1 daily, furosemide 80 mg b.i.d., Percocet

5/325 one q. 6 hours p.r.n. pain, Renvela 800 mg 1/2 tablet t.i.d., simvastatin

10 mg at bedtime, MiraLax 17 g daily.



FOLLOWUP:  The patient is to follow up for outpatient dialysis on Friday, follow

up with Dr. Nuno as needed.

 



______________________________

KIESHA NUNO MD DR:  IGNACIO/yuniel  JOB#:  [id number] / [id number]

DD:  05/25/2017 08:33  DT:  05/25/2017 20:00

## 2017-05-25 NOTE — PDOC
PROGRESS NOTES


Subjective


Subjective


Patient without complaint, abdominal pain has resolved, feels ready to go home 

today.





Objective


Objective





Vital Signs








  Date Time  Temp Pulse Resp B/P (MAP) Pulse Ox O2 Delivery O2 Flow Rate FiO2


 


5/25/17 07:00 97.9 81 20 111/69 (83) 92 Room Air  





 97.9       


 


5/24/17 12:05       3.0 














Intake and Output 


 


 5/25/17





 07:00


 


Output Total 50 ml


 


Balance -50 ml


 


 


 


Output Urine Total 50 ml


 


# Voids 3











Physical Exam


Abdomen:  Normal bowel sounds, Soft, No tenderness


Heart:  Regular rate


Extremities:  Other (chronic lymphedema bilateral LE's)


General:  Alert, Oriented X3, No acute distress


Lungs:  Clear to auscultation





Assessment


Assessment


Problems


Medical Problems:


(1) Abdominal pain


Status: Acute  





(2) ESRD (end stage renal disease) on dialysis


Status: Acute  





(3) Fluid overload


Status: Acute  





(4) Peripheral edema


Status: Acute  











Plan


Plan of Care


1. Peritonitis - much improved. WBC's now WNL. Home today on po abx per ID 

recommendations.


2. ESRD - started on hemodialysis yesterday after placement of temporary 

catheter. Arrangements being made for him to follow up for outpatient dialysis 

on Friday. Apparently no dialysis needed today.


3. DM2 - fairly well controlled, continue ADA diet, patient uses prn Humalog at 

home.


4. CHF - compensated, continue his usual Lasix.





Comment


Review of Relevant


I have reviewed the following items henry (where applicable) has been applied.


Labs





Laboratory Tests








Test


  5/23/17


11:14 5/23/17


16:22 5/23/17


20:06 5/24/17


07:21


 


Glucose (Fingerstick)


  139 mg/dL


(70-99) 117 mg/dL


(70-99) 219 mg/dL


(70-99) 274 mg/dL


(70-99)


 


Test


  5/24/17


16:28 5/24/17


20:44 5/25/17


03:50 


 


 


Glucose (Fingerstick)


  183 mg/dL


(70-99) 255 mg/dL


(70-99) 


  


 


 


White Blood Count


  


  


  8.0 x10^3/uL


(4.0-11.0) 


 


 


Red Blood Count


  


  


  2.63 x10^6/uL


(4.30-5.70) 


 


 


Hemoglobin


  


  


  8.6 g/dL


(13.0-17.5) 


 


 


Hematocrit


  


  


  25.6 %


(39.0-53.0) 


 


 


Mean Corpuscular Volume   98 fL ()  


 


Mean Corpuscular Hemoglobin   33 pg (25-35)  


 


Mean Corpuscular Hemoglobin


Concent 


  


  34 g/dL


(31-37) 


 


 


Red Cell Distribution Width


  


  


  15.5 %


(11.5-14.5) 


 


 


Platelet Count


  


  


  231 x10^3/uL


(140-400) 


 


 


Sodium Level


  


  


  142 mmol/L


(136-145) 


 


 


Potassium Level


  


  


  3.9 mmol/L


(3.5-5.1) 


 


 


Chloride Level


  


  


  102 mmol/L


() 


 


 


Carbon Dioxide Level


  


  


  34 mmol/L


(21-32) 


 


 


Anion Gap   6 (6-14)  


 


Blood Urea Nitrogen


  


  


  50 mg/dL


(8-26) 


 


 


Creatinine


  


  


  5.1 mg/dL


(0.7-1.3) 


 


 


Estimated GFR


(Cockcroft-Gault) 


  


  13.8 


  


 


 


Glucose Level


  


  


  203 mg/dL


(70-99) 


 


 


Calcium Level


  


  


  8.2 mg/dL


(8.5-10.1) 


 








Laboratory Tests








Test


  5/24/17


16:28 5/24/17


20:44 5/25/17


03:50


 


Glucose (Fingerstick)


  183 mg/dL


(70-99) 255 mg/dL


(70-99) 


 


 


White Blood Count


  


  


  8.0 x10^3/uL


(4.0-11.0)


 


Red Blood Count


  


  


  2.63 x10^6/uL


(4.30-5.70)


 


Hemoglobin


  


  


  8.6 g/dL


(13.0-17.5)


 


Hematocrit


  


  


  25.6 %


(39.0-53.0)


 


Mean Corpuscular Volume   98 fL () 


 


Mean Corpuscular Hemoglobin   33 pg (25-35) 


 


Mean Corpuscular Hemoglobin


Concent 


  


  34 g/dL


(31-37)


 


Red Cell Distribution Width


  


  


  15.5 %


(11.5-14.5)


 


Platelet Count


  


  


  231 x10^3/uL


(140-400)


 


Sodium Level


  


  


  142 mmol/L


(136-145)


 


Potassium Level


  


  


  3.9 mmol/L


(3.5-5.1)


 


Chloride Level


  


  


  102 mmol/L


()


 


Carbon Dioxide Level


  


  


  34 mmol/L


(21-32)


 


Anion Gap   6 (6-14) 


 


Blood Urea Nitrogen


  


  


  50 mg/dL


(8-26)


 


Creatinine


  


  


  5.1 mg/dL


(0.7-1.3)


 


Estimated GFR


(Cockcroft-Gault) 


  


  13.8 


 


 


Glucose Level


  


  


  203 mg/dL


(70-99)


 


Calcium Level


  


  


  8.2 mg/dL


(8.5-10.1)








Medications





Current Medications


Iohexol (Omnipaque 300 Mg/ml) 50 ml 1X  ONCE IJ  Last administered on 5/21/17at 

10:15;  Start 5/21/17 at 10:00;  Stop 5/21/17 at 10:01;  Status DC


Iohexol (Omnipaque 240 Mg/ml) 50 ml 1X  ONCE PO  Last administered on 5/21/17at 

10:00;  Start 5/21/17 at 10:00;  Stop 5/21/17 at 10:09;  Status DC


Ondansetron HCl (Zofran) 4 mg 1X  ONCE IV  Last administered on 5/21/17at 11:20

;  Start 5/21/17 at 11:15;  Stop 5/21/17 at 11:16;  Status DC


Morphine Sulfate 10 mg STK-MED ONCE .ROUTE ;  Start 5/21/17 at 11:08;  Stop 5/21 /17 at 11:09;  Status DC


Morphine Sulfate 5 mg 1X  ONCE IV  Last administered on 5/21/17at 11:20;  Start 

5/21/17 at 11:15;  Stop 5/21/17 at 11:16;  Status DC


Ondansetron HCl (Zofran) 4 mg 1X  ONCE IV  Last administered on 5/21/17at 12:02

;  Start 5/21/17 at 12:00;  Stop 5/21/17 at 12:01;  Status DC


Hydromorphone HCl (Dilaudid) 2 mg STK-MED ONCE .ROUTE ;  Start 5/21/17 at 13:14

;  Stop 5/21/17 at 13:15;  Status DC


Hydromorphone HCl (Dilaudid) 1 mg 1X  ONCE IV  Last administered on 5/21/17at 13

:19;  Start 5/21/17 at 13:30;  Stop 5/21/17 at 13:31;  Status DC


Ondansetron HCl (Zofran) 4 mg PRN Q8HRS  PRN IV NAUSEA/VOMITING;  Start 5/21/17 

at 14:30;  Stop 5/22/17 at 14:29;  Status DC


Fentanyl Citrate (Fentanyl 2ml Vial) 50 mcg PRN Q1HR  PRN IV PAIN Last 

administered on 5/22/17at 13:09;  Start 5/21/17 at 14:30;  Stop 5/22/17 at 14:29

;  Status DC


Insulin Aspart (NovoLOG) 0-7 UNITS TIDWMEALS SQ  Last administered on 5/25/17at 

08:02;  Start 5/21/17 at 17:00


Dextrose (Dextrose 50%-Water Syringe) 12.5 gm PRN Q15MIN  PRN IV SEE COMMENTS;  

Start 5/21/17 at 16:00


Oxycodone/ Acetaminophen (Percocet 5/325) 1 tab PRN Q6HRS  PRN PO PAIN Last 

administered on 5/22/17at 10:56;  Start 5/21/17 at 16:00


Polyethylene Glycol (miraLAX PACKET) 17 gm DAILY PO  Last administered on 5/25/ 17at 07:56;  Start 5/22/17 at 09:00


Bisacodyl (Dulcolax Tab) 10 mg PRN DAILY  PRN PO CONSTIPATION;  Start 5/21/17 

at 21:00


Vitamin B Complex/ Vitamin C (Winsome-Samir) 1 tab DAILY PO  Last administered on 5/ 25/17at 07:56;  Start 5/22/17 at 09:00


Furosemide (Lasix) 80 mg BID94 PO  Last administered on 5/25/17at 07:56;  Start 

5/22/17 at 09:00


Sevelamer Carbonate (Renvela) 400 mg TIDWMEALS PO  Last administered on 5/25/ 17at 07:56;  Start 5/22/17 at 09:00


Simvastatin (Zocor) 10 mg QHS PO  Last administered on 5/24/17at 20:14;  Start 5 /22/17 at 21:00


Calcitriol (Rocaltrol) 0.5 mcg DAILY PO  Last administered on 5/25/17at 07:56;  

Start 5/22/17 at 09:00


Piperacillin Sod/ Tazobactam Sod 2.25 gm/Sodium Chloride 50 ml @  100 mls/hr 

Q8HRS IV  Last administered on 5/25/17at 05:25;  Start 5/22/17 at 10:00


Info (PHARMACY MONITORING -- do not chart) 4 each Q6HRS MC ;  Start 5/22/17 at 

12:00;  Status Cancel


Peritoneal Dialysis Solution 2,000 ml @  500 mls/hr Q4HRS IP  Last administered 

on 5/22/17at 12:41;  Start 5/22/17 at 12:00;  Stop 5/22/17 at 15:12;  Status DC


Iohexol (Omnipaque 300 Mg/ml) 50 ml STK-MED ONCE .ROUTE ;  Start 5/22/17 at 14:

27;  Stop 5/22/17 at 14:28;  Status DC


Iohexol (Omnipaque 300 Mg/ml) 50 ml 1X  ONCE INT CAT  Last administered on 5/22/ 17at 14:58;  Start 5/22/17 at 15:00;  Stop 5/22/17 at 15:01;  Status DC


Morphine Sulfate 2 mg PRN Q4HRS  PRN IV SEVERE PAIN Last administered on 5/24/ 17at 02:50;  Start 5/22/17 at 15:45


Morphine Sulfate 3 mg PRN Q4HRS  PRN IV SEVERE PAIN;  Start 5/22/17 at 16:00


Morphine Sulfate 4 mg PRN Q4HRS  PRN IV SEVERE PAIN Last administered on 5/24/ 17at 20:15;  Start 5/22/17 at 16:00


Morphine Sulfate 5 mg PRN Q4HRS  PRN IV SEVERE PAIN;  Start 5/22/17 at 16:00


Cefazolin Sodium/ Dextrose 50 ml @  100 mls/hr 1X PREOP  PRN IV prophylaxis 

Last administered on 5/23/17at 15:25;  Start 5/23/17 at 06:00;  Stop 5/23/17 at 

18:00;  Status DC


Oxycodone/ Acetaminophen (Percocet 5/325) 2 tab PRN Q6HRS  PRN PO PAIN Last 

administered on 5/24/17at 18:12;  Start 5/22/17 at 18:30


Dexamethasone Sodium Phosphate (Decadron) 20 mg STK-MED ONCE .ROUTE ;  Start 5/ 23/17 at 12:21;  Stop 5/23/17 at 12:22;  Status DC


Ondansetron HCl (Zofran) 4 mg STK-MED ONCE .ROUTE ;  Start 5/23/17 at 12:21;  

Stop 5/23/17 at 12:22;  Status DC


Propofol 20 ml @ As Directed STK-MED ONCE IV ;  Start 5/23/17 at 12:21;  Stop 5/ 23/17 at 12:22;  Status DC


Lidocaine HCl (Lidocaine Pf 2% Vial) 5 ml STK-MED ONCE .ROUTE ;  Start 5/23/17 

at 12:21;  Stop 5/23/17 at 12:22;  Status DC


Midazolam HCl (Versed) 2 mg STK-MED ONCE .ROUTE ;  Start 5/23/17 at 12:21;  

Stop 5/23/17 at 12:22;  Status DC


Fentanyl Citrate (Fentanyl 2ml Vial) 100 mcg STK-MED ONCE .ROUTE ;  Start 5/23/ 17 at 12:22;  Stop 5/23/17 at 12:23;  Status DC


Rocuronium Bromide (Zemuron) 50 mg STK-MED ONCE .ROUTE ;  Start 5/23/17 at 12:22

;  Stop 5/23/17 at 12:23;  Status DC


Neomycin/ Polymyxin/ Bacitracin (Triple Antibiotic Ointment) 1 pkt STK-MED ONCE 

TP ;  Start 5/23/17 at 12:54;  Stop 5/23/17 at 12:55;  Status DC


Bupivacaine HCl/ Epinephrine Bitart (Sensorcain-Mpf Epi 0.5%-1:003063) 30 ml STK

-MED ONCE .ROUTE  Last administered on 5/23/17at 15:31;  Start 5/23/17 at 12:54

;  Stop 5/23/17 at 12:55;  Status DC


Sodium Chloride 1,000 ml @  30 mls/hr Q24H IV  Last administered on 5/24/17at 14

:15;  Start 5/23/17 at 14:15


Phenylephrine HCl 1 mg STK-MED ONCE IV ;  Start 5/23/17 at 15:24;  Stop 5/23/17 

at 15:25;  Status DC


Desflurane (Suprane) 30 ml STK-MED ONCE IH ;  Start 5/23/17 at 15:43;  Stop 5/23 /17 at 15:44;  Status DC


Propofol 20 ml @ As Directed STK-MED ONCE IV ;  Start 5/23/17 at 15:43;  Stop 5/ 23/17 at 15:44;  Status DC


Lidocaine HCl (Lidocaine Pf 2% Vial) 5 ml STK-MED ONCE .ROUTE ;  Start 5/23/17 

at 15:43;  Stop 5/23/17 at 15:44;  Status DC


Ondansetron HCl (Zofran) 4 mg STK-MED ONCE .ROUTE ;  Start 5/23/17 at 15:43;  

Stop 5/23/17 at 15:44;  Status DC


Famotidine (Pepcid) 20 mg STK-MED ONCE .ROUTE ;  Start 5/23/17 at 15:44;  Stop 5 /23/17 at 15:45;  Status DC


Phenylephrine HCl 1 mg STK-MED ONCE IV ;  Start 5/23/17 at 15:44;  Stop 5/23/17 

at 15:45;  Status DC


Glycopyrrolate (Robinul) 1 mg STK-MED ONCE .ROUTE ;  Start 5/23/17 at 15:54;  

Stop 5/23/17 at 15:55;  Status DC


Neostigmine Methylsulfate 5 mg STK-MED ONCE .ROUTE ;  Start 5/23/17 at 15:56;  

Stop 5/23/17 at 15:57;  Status DC


Fentanyl Citrate (Fentanyl 2ml Vial) 100 mcg STK-MED ONCE .ROUTE ;  Start 5/23/ 17 at 16:10;  Stop 5/23/17 at 16:11;  Status DC


Prochlorperazine Edisylate (Compazine) 10 mg STK-MED ONCE .ROUTE ;  Start 5/23/ 17 at 16:24;  Stop 5/23/17 at 16:25;  Status DC


Fentanyl Citrate (Fentanyl 2ml Vial) 100 mcg STK-MED ONCE .ROUTE ;  Start 5/23/ 17 at 16:24;  Stop 5/23/17 at 16:25;  Status DC


Ondansetron HCl (Zofran) 4 mg PRN Q6HRS  PRN IV NAUSEA/VOMITING;  Start 5/23/17 

at 16:30;  Stop 5/24/17 at 16:29;  Status DC


Fentanyl Citrate (Fentanyl 2ml Vial) 25 mcg PRN Q5MIN  PRN IV MILD PAIN;  Start 

5/23/17 at 16:30;  Stop 5/24/17 at 16:29;  Status DC


Fentanyl Citrate (Fentanyl 2ml Vial) 50 mcg PRN Q5MIN  PRN IV MODERATE PAIN;  

Start 5/23/17 at 16:30;  Stop 5/24/17 at 16:29;  Status DC


Morphine Sulfate 1 mg PRN Q10MIN  PRN IV SEVERE PAIN;  Start 5/23/17 at 16:30;  

Stop 5/24/17 at 16:29;  Status DC


Ringer's Solution 1,000 ml @  0 mls/hr Q0M IV ;  Start 5/23/17 at 16:29;  Stop 5 /24/17 at 04:28;  Status DC


Lidocaine HCl 2 ml PRN 1X  PRN ID PRIOR TO IV START;  Start 5/23/17 at 16:30;  

Stop 5/24/17 at 16:29;  Status DC


Hydromorphone HCl (Dilaudid) 0.5 mg PRN Q10MIN  PRN IV SEV PAIN, Second choice;

  Start 5/23/17 at 16:30;  Stop 5/24/17 at 16:29;  Status DC


Prochlorperazine Edisylate (Compazine) 5 mg PACU PRN  PRN IV NAUSEA, MRX1 Last 

administered on 5/23/17at 16:31;  Start 5/23/17 at 16:30;  Stop 5/24/17 at 16:29

;  Status DC


Heparin Sodium (Porcine) (Heparin Sodium) 10,000 unit STK-MED ONCE .ROUTE ;  

Start 5/24/17 at 11:01;  Stop 5/24/17 at 11:02;  Status DC


Lidocaine/ Epinephrine (Xylocaine 1%-Epi 1:100,000) 20 ml STK-MED ONCE .ROUTE ;

  Start 5/24/17 at 11:01;  Stop 5/24/17 at 11:02;  Status DC


Heparin Sodium/ Sodium Chloride 500 ml @  As Directed STK-MED ONCE .ROUTE ;  

Start 5/24/17 at 11:01;  Stop 5/24/17 at 11:02;  Status DC


Midazolam HCl (Versed) 2 mg STK-MED ONCE .ROUTE ;  Start 5/24/17 at 11:26;  

Stop 5/24/17 at 11:27;  Status DC


Cefazolin Sodium 50 ml @ As Directed STK-MED ONCE IV ;  Start 5/24/17 at 11:26;

  Stop 5/24/17 at 11:27;  Status DC


Midazolam HCl (Versed) 2 mg STK-MED ONCE .ROUTE ;  Start 5/24/17 at 11:52;  

Stop 5/24/17 at 11:53;  Status DC


Heparin Sodium/ Sodium Chloride 1,000 unit 1X  ONCE IART  Last administered on 5 /24/17at 12:04;  Start 5/24/17 at 12:00;  Stop 5/24/17 at 12:01;  Status DC


Midazolam HCl (Versed) 2.5 mg 1X  ONCE IV  Last administered on 5/24/17at 12:05

;  Start 5/24/17 at 12:00;  Stop 5/24/17 at 12:01;  Status DC


Fentanyl Citrate (Fentanyl 2ml Vial) 100 mcg 1X  ONCE IV  Last administered on 5 /24/17at 12:05;  Start 5/24/17 at 12:00;  Stop 5/24/17 at 12:01;  Status DC


Cefazolin Sodium 50 ml @  100 mls/hr 1X  ONCE IV  Last administered on 5/24/ 17at 12:04;  Start 5/24/17 at 12:00;  Stop 5/24/17 at 12:29;  Status DC


Lidocaine/ Epinephrine (Xylocaine 1%-Epi 1:100,000) 8 ml 1X  ONCE INJ  Last 

administered on 5/24/17at 12:04;  Start 5/24/17 at 12:00;  Stop 5/24/17 at 12:01

;  Status DC


Heparin Sodium (Porcine) (Heparin Sodium) 4,300 unit 1X  ONCE INT CAT  Last 

administered on 5/24/17at 12:06;  Start 5/24/17 at 12:00;  Stop 5/24/17 at 12:01

;  Status DC


Sodium Chloride 1,000 ml @  1,000 mls/hr Q1H PRN IV hypotension;  Start 5/24/17 

at 13:51;  Stop 5/24/17 at 19:50;  Status DC


Acetaminophen (Tylenol) 500 mg 1X PRN  PRN PO MILD PAIN / TEMP;  Start 5/24/17 

at 14:00;  Stop 5/25/17 at 13:59


Diphenhydramine HCl (Benadryl) 25 mg 1X PRN  PRN IV ITCHING;  Start 5/24/17 at 

14:00;  Stop 5/25/17 at 13:59


Diphenhydramine HCl (Benadryl) 25 mg 1X PRN  PRN IV ITCHING;  Start 5/24/17 at 

14:00;  Stop 5/25/17 at 13:59


Labetalol HCl (Normodyne) 10 mg PRN Q1HR  PRN IVP SBP > 180;  Start 5/24/17 at 

14:00;  Stop 5/25/17 at 13:59


Info (PHARMACY MONITORING -- do not chart) 1 each PRN DAILY  PRN MC SEE COMMENTS

;  Start 5/24/17 at 14:00





Active Scripts


Active


Calcitriol 0.5 Mcg Capsule 1 Cap PO DAILY


Reported


Nephro-Samir Tablet (Folic Acid/Vitamin B Comp W-C) 0.8 Mg Tablet 1 Tab PO DAILY


Lasix (Furosemide) 80 Mg Tablet 1 Tab PO BID


Simvastatin 10 Mg Tablet 1 Tab PO QHS


Renvela (Sevelamer Carbonate) 800 Mg Tablet 0.5 Tab PO TID


Percocet 5-325 Mg Tablet (Oxycodone/Acetaminophen) 1 Each Tablet 1 Tab PO Q4HRS 

PRN


     dose taken __________,  next dose may be taken at __________


Vitals/I & O





Vital Sign - Last 24 Hours








 5/24/17 5/24/17 5/24/17 5/24/17





 09:58 10:55 11:56 12:05


 


Temp  98.0  





  98.0  


 


Pulse  73 75 


 


Resp  18 17 20


 


B/P (MAP)  155/72 (99)  


 


Pulse Ox  90 99 95


 


O2 Delivery Room Air Room Air Nasal Cannula Nasal Cannula


 


O2 Flow Rate   3.0 3.0


 


    





    





 5/24/17 5/24/17 5/24/17 5/24/17





 12:15 12:30 12:45 15:00


 


Temp 98.2 98.2 98.2 98.1





 98.2 98.2 98.2 98.1


 


Pulse 78 80 87 76


 


Resp 18 18 18 18


 


B/P (MAP) 120/65 (83) 104/44 (64) 116/61 (79) 119/69 (86)


 


Pulse Ox 95 95 96 93


 


O2 Delivery Room Air Room Air Room Air Room Air


 


    





    





 5/24/17 5/24/17 5/24/17 5/24/17





 18:12 19:00 20:00 20:15


 


Temp  98.3  





  98.3  


 


Pulse  80  


 


Resp  15  22


 


B/P (MAP)  100/58 (72)  


 


Pulse Ox  92  


 


O2 Delivery Room Air Room Air Room Air Room Air


 


    





    





 5/24/17 5/24/17 5/24/17 5/25/17





 20:16 20:55 23:02 02:37


 


Temp   98.8 98.6





   98.8 98.6


 


Pulse   69 72


 


Resp 22 18 17 18


 


B/P (MAP)   131/73 (92) 110/58 (75)


 


Pulse Ox   95 93


 


O2 Delivery Room Air Room Air Room Air Room Air


 


    





    





 5/25/17   





 07:00   


 


Temp 97.9   





 97.9   


 


Pulse 81   


 


Resp 20   


 


B/P (MAP) 111/69 (83)   


 


Pulse Ox 92   


 


O2 Delivery Room Air   














Intake and Output   


 


 5/24/17 5/24/17 5/25/17





 15:00 23:00 07:00


 


Output Total  50 ml 


 


Balance  -50 ml 

















KIESHA NUNO MD May 25, 2017 08:23

## 2017-05-25 NOTE — PDOC
Infectious Disease Note


Subjective


Subjective


pt feeling good, no abd pain,





ROS


ROS


GEN: Denies fevers, chills, sweats


HEENT: Denies blurred vision, sore throat


CV: Denies chest pain


RESP: Denies shortness of air, cough


GI: Denies n/v/d


NEURO: Denies confusion, dizziness


MSK: Denies weakness, joint pain/swelling





Vital Sign


Vital Signs





Vital Signs








  Date Time  Temp Pulse Resp B/P (MAP) Pulse Ox O2 Delivery O2 Flow Rate FiO2


 


5/25/17 07:00 97.9 81 20 111/69 (83) 92 Room Air  





 97.9       


 


5/24/17 12:05       3.0 











Physical Exam


PHYSICAL EXAM


GENERAL:  NAD, Alert


HEENT:  PERRL, OC/OP


NECK:  Supple, no JVD, no LN


LUNGS:  Clear


HEART:  S1S2, no gallop, no murmur


ABD:  Soft, NT, no organomegaly, no rebound


EXT:  No edema, no cyanosis


CNS:  Alert, oriented x 3, no focal neurologic deficit


SKIN:  No rash


IV: ok





Labs


Lab





Laboratory Tests








Test


  5/24/17


12:48 5/24/17


16:28 5/24/17


20:44 5/25/17


03:50


 


Glucose (Fingerstick)


  186 mg/dL


(70-99) 183 mg/dL


(70-99) 255 mg/dL


(70-99) 


 


 


White Blood Count


  


  


  


  8.0 x10^3/uL


(4.0-11.0)


 


Red Blood Count


  


  


  


  2.63 x10^6/uL


(4.30-5.70)


 


Hemoglobin


  


  


  


  8.6 g/dL


(13.0-17.5)


 


Hematocrit


  


  


  


  25.6 %


(39.0-53.0)


 


Mean Corpuscular Volume    98 fL () 


 


Mean Corpuscular Hemoglobin    33 pg (25-35) 


 


Mean Corpuscular Hemoglobin


Concent 


  


  


  34 g/dL


(31-37)


 


Red Cell Distribution Width


  


  


  


  15.5 %


(11.5-14.5)


 


Platelet Count


  


  


  


  231 x10^3/uL


(140-400)


 


Sodium Level


  


  


  


  142 mmol/L


(136-145)


 


Potassium Level


  


  


  


  3.9 mmol/L


(3.5-5.1)


 


Chloride Level


  


  


  


  102 mmol/L


()


 


Carbon Dioxide Level


  


  


  


  34 mmol/L


(21-32)


 


Anion Gap    6 (6-14) 


 


Blood Urea Nitrogen


  


  


  


  50 mg/dL


(8-26)


 


Creatinine


  


  


  


  5.1 mg/dL


(0.7-1.3)


 


Estimated GFR


(Cockcroft-Gault) 


  


  


  13.8 


 


 


Glucose Level


  


  


  


  203 mg/dL


(70-99)


 


Calcium Level


  


  


  


  8.2 mg/dL


(8.5-10.1)


 


Test


  5/25/17


07:06 


  


  


 


 


Glucose (Fingerstick)


  203 mg/dL


(70-99) 


  


  


 











Objective


Assessment


Abdominal pain, resolved


PD cath mal function vs infection/peritonitis,, s/p removal


ESRD


DM


Chronic lymphedema


CHF





Plan


Plan of Care





 po augmentin for d/c 


d/w dr Rhett CRUZ,SALOME BEACH MD May 25, 2017 08:52

## 2017-05-25 NOTE — PDOC
Renal-Progress Notes


Subjective Notes


Notes


FEELS WELL





History of Present Illness


Hx of present illness


STABLE





Vitals


Vitals





Vital Signs








  Date Time  Temp Pulse Resp B/P (MAP) Pulse Ox O2 Delivery O2 Flow Rate FiO2


 


5/25/17 10:49 97.8 67 20 116/66 (83) 95 Room Air  





 97.8       


 


5/24/17 12:05       3.0 








Weight


Weight [ ]





I.O.


Intake and Output











Intake and Output 


 


 5/25/17





 07:00


 


Output Total 50 ml


 


Balance -50 ml


 


 


 


Output Urine Total 50 ml


 


# Voids 3











Labs


Labs





Laboratory Tests








Test


  5/24/17


12:48 5/24/17


16:28 5/24/17


20:44 5/25/17


03:50


 


Glucose (Fingerstick)


  186 mg/dL


(70-99) 183 mg/dL


(70-99) 255 mg/dL


(70-99) 


 


 


White Blood Count


  


  


  


  8.0 x10^3/uL


(4.0-11.0)


 


Red Blood Count


  


  


  


  2.63 x10^6/uL


(4.30-5.70)


 


Hemoglobin


  


  


  


  8.6 g/dL


(13.0-17.5)


 


Hematocrit


  


  


  


  25.6 %


(39.0-53.0)


 


Mean Corpuscular Volume    98 fL () 


 


Mean Corpuscular Hemoglobin    33 pg (25-35) 


 


Mean Corpuscular Hemoglobin


Concent 


  


  


  34 g/dL


(31-37)


 


Red Cell Distribution Width


  


  


  


  15.5 %


(11.5-14.5)


 


Platelet Count


  


  


  


  231 x10^3/uL


(140-400)


 


Sodium Level


  


  


  


  142 mmol/L


(136-145)


 


Potassium Level


  


  


  


  3.9 mmol/L


(3.5-5.1)


 


Chloride Level


  


  


  


  102 mmol/L


()


 


Carbon Dioxide Level


  


  


  


  34 mmol/L


(21-32)


 


Anion Gap    6 (6-14) 


 


Blood Urea Nitrogen


  


  


  


  50 mg/dL


(8-26)


 


Creatinine


  


  


  


  5.1 mg/dL


(0.7-1.3)


 


Estimated GFR


(Cockcroft-Gault) 


  


  


  13.8 


 


 


Glucose Level


  


  


  


  203 mg/dL


(70-99)


 


Calcium Level


  


  


  


  8.2 mg/dL


(8.5-10.1)


 


Test


  5/25/17


07:06 5/25/17


10:09 


  


 


 


Glucose (Fingerstick)


  203 mg/dL


(70-99) 196 mg/dL


(70-99) 


  


 











Review of Systems


Constitutional:  yes: alert, oriented


Ears/Nose/Throat:  Yes: no symptom reported


Eyes:  Yes: no symptom reported


Pulmonary:  Yes no symptom reported


Cardiovascular:  Yes no symptom reported


Gastrointestional:  Yes: abdominal pain


Musculoskeletal:  Yes: muscle stiffness


Skin:  Yes no symptom reported


Psychiatric/Neurological:  Yes: no symptom reported





Physical Exam


General Appearance:  no apparent distress


Skin:  warm


Respiratory:  bilateral CTA


Heart:  S1S2


Abdomen:  soft, bowel sounds present


Extremities:  pulses present


Neurology:  alert





Assessment


Assessment


IMP





ESRD


NON FUNCTIONING PD CATHETER


S/P PD CATHETER REMOVAL


ANEMIA


LEUCOCYTOSIS


ABD PAIN RESOLVED





PLAN





ANTIBIOTICS PER ID


OP HD HAS BEEN SET UP


WILL HAVE HIM RESUME PD AT LATER DATE WHEN FEASIBLE


OK TO D/C FROM RENAL STANDPOINT











LILA GONZALEZ MD May 25, 2017 12:02

## 2017-05-25 NOTE — RAD
Ultrasound and fluoro guided placement of right IJ tunneled hemodialysis

catheter



Indication: 66-year-old male with end stage renal disease.  Has nonfunctioning

peritoneal dialysis catheter has been removed.  Hemodialysis is now required.

Tunneled hemodialysis catheter insertion has been requested by renal.



Fluoro time: 1.0 minute



Kerma-Area Product:  4 Gycm2



Moderate sedation: 22 minutes moderate sedation was provided utilizing a total

of 2.5 mg Versed and 100 mcg fentanyl, IV. The patient was appropriately

monitored by a qualified independent observer throughout the time of moderate

sedation.



Antibiotic: 

A single dose of Ancef was administered within 1 hour of the procedure start

time.



Sterility: 

All elements of maximal sterile barrier technique, hand hygiene, skin

preparation, and, if ultrasound was used, sterile ultrasound technique were

followed.



Consent: The procedure was explained in its entirety to the patient and/or the

patient's designated representative by a member of the treatment team.  This

included a discussion of risks and benefits and commonly accepted alternatives

to the procedure, as well as expected consequences of no treatment at all.

Discussion of risks included, but was not limited to, those that are most

frequent and those that are rare, but possibly severe or life-threatening, as

well as the possibility of unforeseen complications.





Procedure: Informed consent was obtained from the patient. He was placed

supine on the angiography table.  Preliminary ultrasound examination of right

neck revealed wide patency of right internal jugular vein, which was

documented with a hard copy ultrasound image.  Right neck and upper chest were

then prepped and draped in the usual sterile fashion, utilizing all elements

of maximal sterile barrier technique, as described above.  Moderate sedation

was provided with IV Versed and Fentanyl.  1 gram Ancef was given IV,

prophylactically.  Using aseptic technique and local anesthesia, a small skin

incision was made lateral to right internal jugular vein, just above clavicle.

 Using aseptic technique, local anesthesia, and direct ultrasound guidance, a

micropuncture needle was successfully introduced into right internal jugular

vein. The micropuncture needle was then exchanged over a microguidewire for a

micropuncture sheath, through which an Amplatz wire was advanced into IVC,

under fluoroscopic control.  A second small skin incision was then made along

upper anterior aspect of right chest.  A subcutaneous tunnel was then

fashioned between the right chest and supraclavicular incisions.  A 15.5 F 28

cm Dura Max dialysis catheter was pulled through the subcutaneous tunnel from

inferior to superior, utilizing the tunneling device provided.  The right IJ

venostomy tract was then sequentially dilated and the 15.5 Romanian dialysis

catheter was easily advanced centrally through a 16 Romanian peel-away sheath,

and was positioned with its tip at the level of upper right atrium utilizing

fluoroscopic guidance.  This catheter was demonstrated to flush and aspirate

normally, was packed, and was secured at the right chest exit site utilizing

2-0 Prolene and sterile dressing.  The small supraclavicular incision was

closed with 4-0 Vicryl, Steri-Strips, and sterile dressing.  Patient tolerated

the procedure well without apparent complication.  Satisfactory position of

the dialysis catheter was confirmed with a single fluoroscopic spot image. 



Impression: Successful, uneventful ultrasound and fluoro guided placement of

right IJ 15.5 F 28 cm Dura Max tunneled hemodialysis catheter, as described.

## 2017-05-25 NOTE — PDOC
TORIN VALDEZ APRN 5/25/17 1149:


SURGICAL PROGRESS NOTE


Subjective


feels well


plans for discharge today


Vital Signs





Vital Signs








  Date Time  Temp Pulse Resp B/P (MAP) Pulse Ox O2 Delivery O2 Flow Rate FiO2


 


5/25/17 10:49 97.8 67 20 116/66 (83) 95 Room Air  





 97.8       


 


5/24/17 12:05       3.0 








I&O











Intake and Output 


 


 5/25/17





 07:00


 


Output Total 50 ml


 


Balance -50 ml


 


 


 


Output Urine Total 50 ml


 


# Voids 3








General:  Alert, Oriented X3, Cooperative, No acute distress


Abdomen:  Soft, Other (incision c/d/i, bibi removed, staples intact )


Labs





Laboratory Tests








Test


  5/23/17


16:22 5/23/17


20:06 5/24/17


07:21 5/24/17


12:48


 


Glucose (Fingerstick)


  117 mg/dL


(70-99) 219 mg/dL


(70-99) 274 mg/dL


(70-99) 186 mg/dL


(70-99)


 


Test


  5/24/17


16:28 5/24/17


20:44 5/25/17


03:50 5/25/17


07:06


 


Glucose (Fingerstick)


  183 mg/dL


(70-99) 255 mg/dL


(70-99) 


  203 mg/dL


(70-99)


 


White Blood Count


  


  


  8.0 x10^3/uL


(4.0-11.0) 


 


 


Red Blood Count


  


  


  2.63 x10^6/uL


(4.30-5.70) 


 


 


Hemoglobin


  


  


  8.6 g/dL


(13.0-17.5) 


 


 


Hematocrit


  


  


  25.6 %


(39.0-53.0) 


 


 


Mean Corpuscular Volume   98 fL ()  


 


Mean Corpuscular Hemoglobin   33 pg (25-35)  


 


Mean Corpuscular Hemoglobin


Concent 


  


  34 g/dL


(31-37) 


 


 


Red Cell Distribution Width


  


  


  15.5 %


(11.5-14.5) 


 


 


Platelet Count


  


  


  231 x10^3/uL


(140-400) 


 


 


Sodium Level


  


  


  142 mmol/L


(136-145) 


 


 


Potassium Level


  


  


  3.9 mmol/L


(3.5-5.1) 


 


 


Chloride Level


  


  


  102 mmol/L


() 


 


 


Carbon Dioxide Level


  


  


  34 mmol/L


(21-32) 


 


 


Anion Gap   6 (6-14)  


 


Blood Urea Nitrogen


  


  


  50 mg/dL


(8-26) 


 


 


Creatinine


  


  


  5.1 mg/dL


(0.7-1.3) 


 


 


Estimated GFR


(Cockcroft-Gault) 


  


  13.8 


  


 


 


Glucose Level


  


  


  203 mg/dL


(70-99) 


 


 


Calcium Level


  


  


  8.2 mg/dL


(8.5-10.1) 


 


 


Test


  5/25/17


10:09 


  


  


 


 


Glucose (Fingerstick)


  196 mg/dL


(70-99) 


  


  


 








Laboratory Tests








Test


  5/24/17


12:48 5/24/17


16:28 5/24/17


20:44 5/25/17


03:50


 


Glucose (Fingerstick)


  186 mg/dL


(70-99) 183 mg/dL


(70-99) 255 mg/dL


(70-99) 


 


 


White Blood Count


  


  


  


  8.0 x10^3/uL


(4.0-11.0)


 


Red Blood Count


  


  


  


  2.63 x10^6/uL


(4.30-5.70)


 


Hemoglobin


  


  


  


  8.6 g/dL


(13.0-17.5)


 


Hematocrit


  


  


  


  25.6 %


(39.0-53.0)


 


Mean Corpuscular Volume    98 fL () 


 


Mean Corpuscular Hemoglobin    33 pg (25-35) 


 


Mean Corpuscular Hemoglobin


Concent 


  


  


  34 g/dL


(31-37)


 


Red Cell Distribution Width


  


  


  


  15.5 %


(11.5-14.5)


 


Platelet Count


  


  


  


  231 x10^3/uL


(140-400)


 


Sodium Level


  


  


  


  142 mmol/L


(136-145)


 


Potassium Level


  


  


  


  3.9 mmol/L


(3.5-5.1)


 


Chloride Level


  


  


  


  102 mmol/L


()


 


Carbon Dioxide Level


  


  


  


  34 mmol/L


(21-32)


 


Anion Gap    6 (6-14) 


 


Blood Urea Nitrogen


  


  


  


  50 mg/dL


(8-26)


 


Creatinine


  


  


  


  5.1 mg/dL


(0.7-1.3)


 


Estimated GFR


(Cockcroft-Gault) 


  


  


  13.8 


 


 


Glucose Level


  


  


  


  203 mg/dL


(70-99)


 


Calcium Level


  


  


  


  8.2 mg/dL


(8.5-10.1)


 


Test


  5/25/17


07:06 5/25/17


10:09 


  


 


 


Glucose (Fingerstick)


  203 mg/dL


(70-99) 196 mg/dL


(70-99) 


  


 








Problem List


Problems


Medical Problems:


(1) Abdominal pain


Status: Acute  





(2) ESRD (end stage renal disease) on dialysis


Status: Acute  





(3) Fluid overload


Status: Acute  





(4) Peripheral edema


Status: Acute  








Assessment/Plan


s/p pd cath removal


bibi removed , dcing home


FU next week in clinic with Dr Thomason


Problems:  





EDI THOMASON MD 5/25/17 1204:


SURGICAL PROGRESS NOTE


Assessment/Plan


agree with above


f/u next week


Problems:  











TORIN VALDEZ May 25, 2017 11:49


EDI THOMASON MD May 25, 2017 12:04

## 2017-06-26 ENCOUNTER — HOSPITAL ENCOUNTER (OUTPATIENT)
Dept: HOSPITAL 61 - SURG | Age: 67
Discharge: HOME | End: 2017-06-26
Attending: SURGERY
Payer: COMMERCIAL

## 2017-06-26 VITALS
DIASTOLIC BLOOD PRESSURE: 83 MMHG | DIASTOLIC BLOOD PRESSURE: 83 MMHG | SYSTOLIC BLOOD PRESSURE: 154 MMHG | SYSTOLIC BLOOD PRESSURE: 154 MMHG

## 2017-06-26 VITALS — HEIGHT: 69 IN | WEIGHT: 251 LBS | BODY MASS INDEX: 37.18 KG/M2

## 2017-06-26 DIAGNOSIS — D64.9: ICD-10-CM

## 2017-06-26 DIAGNOSIS — Z98.42: ICD-10-CM

## 2017-06-26 DIAGNOSIS — N18.6: ICD-10-CM

## 2017-06-26 DIAGNOSIS — Z86.39: ICD-10-CM

## 2017-06-26 DIAGNOSIS — Z99.2: ICD-10-CM

## 2017-06-26 DIAGNOSIS — E78.00: ICD-10-CM

## 2017-06-26 DIAGNOSIS — I50.9: ICD-10-CM

## 2017-06-26 DIAGNOSIS — E21.5: ICD-10-CM

## 2017-06-26 DIAGNOSIS — Z87.01: ICD-10-CM

## 2017-06-26 DIAGNOSIS — J45.909: ICD-10-CM

## 2017-06-26 DIAGNOSIS — I82.409: ICD-10-CM

## 2017-06-26 DIAGNOSIS — I25.10: ICD-10-CM

## 2017-06-26 DIAGNOSIS — E11.22: Primary | ICD-10-CM

## 2017-06-26 DIAGNOSIS — I13.2: ICD-10-CM

## 2017-06-26 DIAGNOSIS — Z91.041: ICD-10-CM

## 2017-06-26 DIAGNOSIS — J44.9: ICD-10-CM

## 2017-06-26 DIAGNOSIS — Z86.14: ICD-10-CM

## 2017-06-26 LAB
ALBUMIN SERPL-MCNC: 3.5 G/DL (ref 3.4–5)
ANION GAP SERPL CALC-SCNC: 9 MMOL/L (ref 6–14)
BASOPHILS # BLD AUTO: 0.1 X10^3/UL (ref 0–0.2)
BASOPHILS NFR BLD: 1 % (ref 0–3)
BUN SERPL-MCNC: 34 MG/DL (ref 8–26)
CALCIUM SERPL-MCNC: 8.9 MG/DL (ref 8.5–10.1)
CHLORIDE SERPL-SCNC: 106 MMOL/L (ref 98–107)
CO2 SERPL-SCNC: 28 MMOL/L (ref 21–32)
CREAT SERPL-MCNC: 4.1 MG/DL (ref 0.7–1.3)
EOSINOPHIL NFR BLD: 2 % (ref 0–3)
ERYTHROCYTE [DISTWIDTH] IN BLOOD BY AUTOMATED COUNT: 17.4 % (ref 11.5–14.5)
GFR SERPLBLD BASED ON 1.73 SQ M-ARVRAT: 17.7 ML/MIN
GLUCOSE SERPL-MCNC: 169 MG/DL (ref 70–99)
HCT VFR BLD CALC: 29.9 % (ref 39–53)
HGB BLD-MCNC: 9.9 G/DL (ref 13–17.5)
LYMPHOCYTES # BLD: 2 X10^3/UL (ref 1–4.8)
LYMPHOCYTES NFR BLD AUTO: 22 % (ref 24–48)
MCH RBC QN AUTO: 33 PG (ref 25–35)
MCHC RBC AUTO-ENTMCNC: 33 G/DL (ref 31–37)
MCV RBC AUTO: 101 FL (ref 79–100)
MONOCYTES NFR BLD: 9 % (ref 0–9)
NEUTROPHILS NFR BLD AUTO: 66 % (ref 31–73)
PLATELET # BLD AUTO: 132 X10^3/UL (ref 140–400)
POTASSIUM SERPL-SCNC: 4.1 MMOL/L (ref 3.5–5.1)
RBC # BLD AUTO: 2.97 X10^6/UL (ref 4.3–5.7)
SODIUM SERPL-SCNC: 143 MMOL/L (ref 136–145)
WBC # BLD AUTO: 9.1 X10^3/UL (ref 4–11)

## 2017-06-26 PROCEDURE — 36415 COLL VENOUS BLD VENIPUNCTURE: CPT

## 2017-06-26 PROCEDURE — 82040 ASSAY OF SERUM ALBUMIN: CPT

## 2017-06-26 PROCEDURE — 85027 COMPLETE CBC AUTOMATED: CPT

## 2017-06-26 PROCEDURE — C1769 GUIDE WIRE: HCPCS

## 2017-06-26 PROCEDURE — 49324 LAP INSERT TUNNEL IP CATH: CPT

## 2017-06-26 PROCEDURE — 82962 GLUCOSE BLOOD TEST: CPT

## 2017-06-26 PROCEDURE — 80048 BASIC METABOLIC PNL TOTAL CA: CPT

## 2017-06-26 RX ADMIN — FENTANYL CITRATE PRN MCG: 50 INJECTION INTRAMUSCULAR; INTRAVENOUS at 09:32

## 2017-06-26 RX ADMIN — FENTANYL CITRATE PRN MCG: 50 INJECTION INTRAMUSCULAR; INTRAVENOUS at 09:40

## 2017-06-26 NOTE — OP
DATE OF SURGERY:  06/26/2017



PREOPERATIVE DIAGNOSIS:  End-stage renal disease, on dialysis.



POSTOPERATIVE DIAGNOSIS:  End-stage renal disease, on dialysis.



PROCEDURE:  Laparoscopic placement of peritoneal dialysis catheter and lysis of

adhesions.



SURGEON:  Edi Thomason MD



ANESTHESIA:  General endotracheal.



ESTIMATED BLOOD LOSS:  10 mL.



IV FLUID:  800.



INDICATIONS:  The patient is a 66-year-old with end-stage renal disease, on

hemodialysis after being on peritoneal dialysis for developing peritonitis.  He

is brought for placement of the catheter.



OPERATIVE FINDINGS:  There were filmy adhesions present, which were taken down

with careful cautery and blunt dissection.



DESCRIPTION OF PROCEDURE:  The patient brought to the operating suite, given a

general endotracheal anesthetic and the abdomen prepped and draped in usual

sterile fashion.  An epigastric incision was infiltrated with local anesthetic,

incised and a 5 mm Visiport used to gain access into the abdominal cavity,

taking care to avoid injury to abdominal contents.  Pneumoperitoneum was

established.  Camera inserted.  The old access site was used for placement of a

5 mm port under direct vision after infiltrating with local anesthetic.  This

allowed takedown of adhesions with careful blunt and cautery dissection,

avoiding injury to the adjacent bowel.



New insertion site was selected above the prior site, infiltrated with local

anesthetic, incised and the Cook needle passed into the abdominal cavity under

direct vision.  The dilators were passed and then the catheter was inserted into

the right lower quadrant under direct vision and the Dacron cuff was seated in

the abdominal wall musculature.  Sheath removed and the catheter was tunneled

superolaterally ____ access site.  The catheter was then flushed with

approximately 500 mL of normal saline, which he radially accepted.  A similar

amount drained to gravity.  The catheter was "packed" with heparinized saline.

 Skin incisions closed with staples.  Sterile dressings applied.  The patient

was awakened from his anesthetic and taken to the recovery room in satisfactory

condition.

 



______________________________

EDI THOMASON MD DR:  BRUNA/yuniel  JOB#:  [id number] / [id number]

DD:  06/26/2017 09:07  DT:  06/26/2017 23:15

## 2017-06-26 NOTE — DISCH
DISCHARGE INSTRUCTIONS


Condition on Discharge


Condition on Discharge:  Stable





Activity After Discharge


Activity Instructions for Disc:  Activity as tolerated, Avoid exertion


Driving Instructions after Dis:  Do not drive today





Diet after Discharge


Diet after Discharge:  Renal Dialysis





Wound Incision Care


Wound/Incision Care:  Ice to area for comfort, Do not change dressing





Follow-Up


Follow up with:  Gutierrez, two weeks











EDI THOMASON MD Jun 26, 2017 09:40

## 2017-06-26 NOTE — PDOC
BRIEF OPERATIVE NOTE


Date:  Jun 26, 2017


Pre-Op Diagnosis


ESRD


Post-Op Diagnosis


same with abdominal adhesions


Procedure Performed


l/s placement of PD catheter, DA


Surgeon


Gutierrez


Anesthesia Type:  General


Blood Loss


10cc


IV Fluid


800cc


Findings


adhesions


Complications


none


Additional Remarks


 # [id number]











EDI THOMASON MD Jun 26, 2017 09:39

## 2017-08-07 ENCOUNTER — HOSPITAL ENCOUNTER (OUTPATIENT)
Dept: HOSPITAL 61 - INTRAD | Age: 67
Discharge: HOME | End: 2017-08-07
Attending: SURGERY
Payer: COMMERCIAL

## 2017-08-07 DIAGNOSIS — Z91.041: ICD-10-CM

## 2017-08-07 DIAGNOSIS — Z87.39: ICD-10-CM

## 2017-08-07 DIAGNOSIS — Z86.718: ICD-10-CM

## 2017-08-07 DIAGNOSIS — E78.00: ICD-10-CM

## 2017-08-07 DIAGNOSIS — J44.9: ICD-10-CM

## 2017-08-07 DIAGNOSIS — I10: ICD-10-CM

## 2017-08-07 DIAGNOSIS — I25.10: ICD-10-CM

## 2017-08-07 DIAGNOSIS — Z86.14: ICD-10-CM

## 2017-08-07 DIAGNOSIS — E11.42: ICD-10-CM

## 2017-08-07 DIAGNOSIS — Z83.3: ICD-10-CM

## 2017-08-07 DIAGNOSIS — J45.909: ICD-10-CM
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
## 2017-08-07 DIAGNOSIS — Z98.42: ICD-10-CM

## 2017-08-07 DIAGNOSIS — T85.611A: Primary | ICD-10-CM

## 2017-08-07 PROCEDURE — 74190 PERITONEOGRAM RS&I: CPT

## 2017-08-07 PROCEDURE — A4215 STERILE NEEDLE: HCPCS

## 2017-08-07 PROCEDURE — C1769 GUIDE WIRE: HCPCS

## 2017-08-07 PROCEDURE — 49400 AIR INJECTION INTO ABDOMEN: CPT

## 2017-08-07 NOTE — RAD
Attempted fluoroscopic manipulation of peritoneal dialysis catheter 8/7/2017



Indication: Nonfunctional catheter



Discussion: Informed consent was obtained. A timeout procedure was performed.

The abdomen was prepped and draped using sterile barrier technique.

Fluoroscopic evaluation demonstrates the peritoneal dialysis catheter to have

its tip coiled in the right lower quadrant, slightly superior in location.

Contrast was administered through the catheter which appeared mildly loculated

around the distal catheter, but then flowed freely into the peritoneum.

Guidewire manipulation of the catheter position was attempted, but was

unsuccessful. The catheter was flushed with saline. No immediate complications

were identified.



Fluoroscopy time: None 3.9 minutes

Dose area product 33 Gycm2



Impression: Catheter tip in the right lower quadrant, somewhat high in

position. Attempts to manipulate catheter position were unsuccessful. Mild

loculation of contrast surrounding the distal catheter noted.

## 2017-08-24 ENCOUNTER — HOSPITAL ENCOUNTER (OUTPATIENT)
Dept: HOSPITAL 61 - SURG | Age: 67
Discharge: HOME | End: 2017-08-24
Attending: SURGERY
Payer: COMMERCIAL

## 2017-08-24 VITALS — HEIGHT: 69 IN | BODY MASS INDEX: 38.21 KG/M2 | WEIGHT: 258 LBS

## 2017-08-24 VITALS — DIASTOLIC BLOOD PRESSURE: 71 MMHG | SYSTOLIC BLOOD PRESSURE: 136 MMHG

## 2017-08-24 DIAGNOSIS — D64.9: ICD-10-CM

## 2017-08-24 DIAGNOSIS — Z86.14: ICD-10-CM

## 2017-08-24 DIAGNOSIS — Z87.01: ICD-10-CM

## 2017-08-24 DIAGNOSIS — Z98.42: ICD-10-CM

## 2017-08-24 DIAGNOSIS — I13.2: Primary | ICD-10-CM

## 2017-08-24 DIAGNOSIS — E11.22: ICD-10-CM

## 2017-08-24 DIAGNOSIS — Z99.2: ICD-10-CM

## 2017-08-24 DIAGNOSIS — Z86.718: ICD-10-CM

## 2017-08-24 DIAGNOSIS — J44.9: ICD-10-CM

## 2017-08-24 DIAGNOSIS — N18.6: ICD-10-CM

## 2017-08-24 DIAGNOSIS — T85.611A: ICD-10-CM

## 2017-08-24 DIAGNOSIS — I50.9: ICD-10-CM
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
## 2017-08-24 LAB
ALBUMIN SERPL-MCNC: 3.6 G/DL (ref 3.4–5)
ANION GAP SERPL CALC-SCNC: 10 MMOL/L (ref 6–14)
BASOPHILS # BLD AUTO: 0 X10^3/UL (ref 0–0.2)
BASOPHILS NFR BLD: 0 % (ref 0–3)
BUN SERPL-MCNC: 56 MG/DL (ref 8–26)
CALCIUM SERPL-MCNC: 9.2 MG/DL (ref 8.5–10.1)
CHLORIDE SERPL-SCNC: 102 MMOL/L (ref 98–107)
CO2 SERPL-SCNC: 27 MMOL/L (ref 21–32)
CREAT SERPL-MCNC: 5.6 MG/DL (ref 0.7–1.3)
EOSINOPHIL NFR BLD: 2 % (ref 0–3)
ERYTHROCYTE [DISTWIDTH] IN BLOOD BY AUTOMATED COUNT: 14.5 % (ref 11.5–14.5)
GFR SERPLBLD BASED ON 1.73 SQ M-ARVRAT: 12.4 ML/MIN
GLUCOSE SERPL-MCNC: 143 MG/DL (ref 70–99)
HCT VFR BLD CALC: 28.6 % (ref 39–53)
HGB BLD-MCNC: 9.6 G/DL (ref 13–17.5)
LYMPHOCYTES # BLD: 1.6 X10^3/UL (ref 1–4.8)
LYMPHOCYTES NFR BLD AUTO: 23 % (ref 24–48)
MCH RBC QN AUTO: 34 PG (ref 25–35)
MCHC RBC AUTO-ENTMCNC: 34 G/DL (ref 31–37)
MCV RBC AUTO: 101 FL (ref 79–100)
MONOCYTES NFR BLD: 10 % (ref 0–9)
NEUTROPHILS NFR BLD AUTO: 64 % (ref 31–73)
PLATELET # BLD AUTO: 176 X10^3/UL (ref 140–400)
POTASSIUM SERPL-SCNC: 4.3 MMOL/L (ref 3.5–5.1)
RBC # BLD AUTO: 2.83 X10^6/UL (ref 4.3–5.7)
SODIUM SERPL-SCNC: 139 MMOL/L (ref 136–145)
WBC # BLD AUTO: 7 X10^3/UL (ref 4–11)

## 2017-08-24 PROCEDURE — 82962 GLUCOSE BLOOD TEST: CPT

## 2017-08-24 PROCEDURE — 82040 ASSAY OF SERUM ALBUMIN: CPT

## 2017-08-24 PROCEDURE — C1769 GUIDE WIRE: HCPCS

## 2017-08-24 PROCEDURE — 36415 COLL VENOUS BLD VENIPUNCTURE: CPT

## 2017-08-24 PROCEDURE — 85025 COMPLETE CBC W/AUTO DIFF WBC: CPT

## 2017-08-24 PROCEDURE — 49325 LAP REVISION PERM IP CATH: CPT

## 2017-08-24 PROCEDURE — 80048 BASIC METABOLIC PNL TOTAL CA: CPT

## 2017-08-24 RX ADMIN — FENTANYL CITRATE PRN MCG: 50 INJECTION INTRAMUSCULAR; INTRAVENOUS at 09:57

## 2017-08-24 RX ADMIN — FENTANYL CITRATE PRN MCG: 50 INJECTION INTRAMUSCULAR; INTRAVENOUS at 10:06

## 2017-08-24 NOTE — OP
DATE OF SURGERY:  08/24/2017



PREOPERATIVE DIAGNOSIS:  End-stage renal disease with malfunctioning peritoneal

dialysis catheter.



POSTOPERATIVE DIAGNOSIS:  End-stage renal disease with malfunctioning peritoneal

dialysis catheter secondary to fibrinous/blood clot in the distal catheter.



PROCEDURE:  Laparoscopic reposition of PD catheter and lysis of adhesions.



SURGEON:  Edi Thomason MD



ANESTHESIA:  General endotracheal.



BLOOD LOSS:  5 mL.



IV FLUID:  500 mL.



URINE OUTPUT:  200 mL.



INDICATIONS:  The patient is a 66-year-old end-stage renal patient previously on

peritoneal dialysis; however, his catheter stopped functioning properly.  He was

temporarily changed to hemodialysis.  He is brought today for evaluation of his

catheter.



OPERATIVE FINDINGS:  The distal catheter was contained between some small bowel

and the right lower quadrant abdominal wall.  The pigtail contained a

fibrinous/blood clot.



OPERATIVE REPORT:  The patient brought to the operating suite, given a general

endotracheal anesthetic.  Altman catheter placement and drainage and the abdomen

prepped and draped in usual sterile fashion.  A right upper quadrant skin

incision was infiltrated with local anesthetic, sharply incised and a 5 mm

Visiport used to gain access into the abdominal cavity, taking care to avoid

injury to abdominal contents.  Pneumoperitoneum was established.  Camera

inserted and inspection carried out with results as noted above.



Under direct vision, a left lower quadrant port was placed after infiltrating

with local anesthetic.  This allowed an atraumatic grasper to be used to free

the catheter and take down some adhesions between the small bowel and the

abdominal wall.  The clot was retrieved and passed off as a specimen.  Catheter

flushed easily under direct vision.



Pneumoperitoneum was released.  The catheter was flushed with normal saline,

which received readily under a minute and a half 500 mL.  Similar amount drained

to dependent drainage.



Ports were removed and the port incisions were closed with interrupted 3-0

Vicryl in the subcutaneous tissue and 4-0 nylon in the skin.  The catheter was

"packed" with 60 mL of heparinized saline.  Sterile dressings applied.  Altman

catheter removed.  The patient was awakened from his anesthetic and taken to the

recovery room in satisfactory condition.

 



______________________________

EDI THOMASON MD



DR:  BRUNA/yuniel  JOB#:  [id number] / [id number]

DD:  08/24/2017 09:52  DT:  08/24/2017 10:36

## 2017-08-24 NOTE — PDOC
BRIEF OPERATIVE NOTE


Date:  Aug 24, 2017


Pre-Op Diagnosis


ESRD, malfunctioning PD catheter


Post-Op Diagnosis


same 2/2 fibrin clot in distal catheter


Procedure Performed


l/s repositioning PD catheter, DA


Surgeon


Gutierrez


Anesthesia Type:  General


Blood Loss


5cc


IV Fluid


500cc


Urine Output


200cc


Specimens Obtained


catheter contents


Findings


tip in RLQ between small bowel and abdominal wall with a fibrous/blood clot in 

the pig-tail


Complications


none


OPerative Note


Wk # [id number]











EDI THOMASON MD Aug 24, 2017 09:53

## 2017-08-24 NOTE — DISCH
DISCHARGE INSTRUCTIONS


Condition on Discharge


Condition on Discharge:  Stable





Activity After Discharge


Activity Instructions for Disc:  Activity as tolerated, Avoid exertion


Lifting Instructions after Dis:  No heavy lifting


Driving Instructions after Dis:  Do not drive today





Diet after Discharge


Diet after Discharge:  Renal Dialysis





Wound Incision Care


Wound/Incision Care:  Ice to area for comfort, Keep wound/cast CDI





Follow-Up


Follow Up With:  Gutierrez in two weeks











EDI THOMASON MD Aug 24, 2017 09:45

## 2017-08-25 NOTE — PATHOLOGY
PATHOLOGY REPORT 

 

*    *    *    *    *    *    *    * 

 FINAL DIAGNOSIS:

Peritoneal dialysis catheter contents:

- Organizing blood clot.

(JPM:mgr; 2017) 

 

 

REPORT ELECTRONICALLY SIGNED BY:   Gorge Henriquez M.D.

DATE/TIME:   2017 17:14

*    *    *    *    *    *    *    *

 

GROSS PATHOLOGY:

The specimen is received in formalin, designated "Julian Mendes,

contents of peritoneal dialysis catheter" and consists of a long,

cylindrical segment of apparent pink tan to dark reddish purple blood

clot, measuring 10 cm in length and 0.4 cm in diameter.  The specimen

is submitted in toto in cassette A1.

(JPM; 17)

 

 

 INITIAL CPT CODE(S):

A; 46681

Professional services performed by [email or web address] [place] 

[place] [address]

 

  Technical services performed by [facility] at [address] [place] [address].

  

 SPECIMEN(S) RECEIVED:

A.Contents of peritoneal dialysis catheter

 

 CLINICAL HISTORY:

End stage renal disease, malfunctioning PD cath

 

 PATIENT:  JULIAN MENDES E

/AGE:  1950 (Age: 66)  

PATIENT #:  [id number]

ACCESSION #:  [id number]          ALT CASE #:   

SPECIMEN COLLECTION DATE:  2017

SPECIMEN RECEIVED DATE:  2017

   

[facility] - [address] [place] [address] - PHONE: 

[phone]

* * *  END OF REPORT  * * *

## 2018-02-05 ENCOUNTER — HOSPITAL ENCOUNTER (INPATIENT)
Dept: HOSPITAL 61 - ER | Age: 68
LOS: 7 days | Discharge: HOME | DRG: 264 | End: 2018-02-12
Attending: FAMILY MEDICINE | Admitting: FAMILY MEDICINE
Payer: COMMERCIAL

## 2018-02-05 DIAGNOSIS — E11.69: ICD-10-CM

## 2018-02-05 DIAGNOSIS — E11.22: ICD-10-CM

## 2018-02-05 DIAGNOSIS — J44.9: ICD-10-CM

## 2018-02-05 DIAGNOSIS — B95.61: ICD-10-CM

## 2018-02-05 DIAGNOSIS — I89.0: ICD-10-CM

## 2018-02-05 DIAGNOSIS — M54.12: ICD-10-CM

## 2018-02-05 DIAGNOSIS — I13.2: Primary | ICD-10-CM

## 2018-02-05 DIAGNOSIS — M46.22: ICD-10-CM

## 2018-02-05 DIAGNOSIS — M19.90: ICD-10-CM

## 2018-02-05 DIAGNOSIS — Z96.611: ICD-10-CM

## 2018-02-05 DIAGNOSIS — Z90.49: ICD-10-CM

## 2018-02-05 DIAGNOSIS — I42.9: ICD-10-CM

## 2018-02-05 DIAGNOSIS — N18.6: ICD-10-CM

## 2018-02-05 DIAGNOSIS — I50.43: ICD-10-CM

## 2018-02-05 DIAGNOSIS — N40.0: ICD-10-CM

## 2018-02-05 DIAGNOSIS — I25.10: ICD-10-CM

## 2018-02-05 DIAGNOSIS — E21.3: ICD-10-CM

## 2018-02-05 DIAGNOSIS — J98.11: ICD-10-CM

## 2018-02-05 DIAGNOSIS — G89.29: ICD-10-CM

## 2018-02-05 DIAGNOSIS — I87.2: ICD-10-CM

## 2018-02-05 DIAGNOSIS — Z86.19: ICD-10-CM

## 2018-02-05 DIAGNOSIS — Z99.2: ICD-10-CM

## 2018-02-05 DIAGNOSIS — D63.1: ICD-10-CM

## 2018-02-05 DIAGNOSIS — E78.5: ICD-10-CM

## 2018-02-05 DIAGNOSIS — Z95.1: ICD-10-CM

## 2018-02-05 DIAGNOSIS — K21.9: ICD-10-CM

## 2018-02-05 DIAGNOSIS — E11.40: ICD-10-CM

## 2018-02-05 LAB
% SAT IRON: 9 % (ref 15–34)
ADD MAN DIFF?: NO
AGAP ISTAT: 16 MMOL/L (ref 6–14)
BASO #: 0 X10^3/UL (ref 0–0.2)
BASO %: 0 % (ref 0–3)
BUN ISTAT: 44 MG/DL (ref 8–26)
CHLORIDE SERPL-SCNC: 101 MMOL/L (ref 98–110)
CREATININE ISTAT: 5.8 MG/DL (ref 0.5–1.4)
EOS #: 0.2 X10^3/UL (ref 0–0.7)
EOS %: 2 % (ref 0–3)
FOLATE: 13.12 NG/ML (ref 3.2–20)
GLUCOSE BLD-MCNC: 150 MG/DL (ref 70–99)
HCG SERPL-ACNC: 11.4 X10^3/UL (ref 4–11)
HEMATOCRIT ISTAT: 21 % (ref 37–52)
HEMATOCRIT: 21.5 % (ref 39–53)
HEMOGLOBIN ISTAT: 7.1 G/DL (ref 14–18)
HEMOGLOBIN: 6.9 G/DL (ref 13–17.5)
INR: 1.1 (ref 0.8–1.1)
ION CA ISTAT: 1.09 MMOL/L (ref 1.13–1.32)
IRON,SERUM: 17 UG/DL (ref 65–175)
LYMPH #: 0.8 X10^3/UL (ref 1–4.8)
LYMPH %: 7 % (ref 24–48)
MEAN CORPUSCULAR HEMOGLOBIN: 32 PG (ref 25–35)
MEAN CORPUSCULAR HGB CONC: 32 G/DL (ref 31–37)
MEAN CORPUSCULAR VOLUME: 99 FL (ref 79–100)
MONO #: 0.9 X10^3/UL (ref 0–1.1)
MONO %: 8 % (ref 0–9)
NEUT #: 9.4 X10^3UL (ref 1.8–7.7)
NEUT %: 83 % (ref 31–73)
NT-PRO BNP: (no result) PG/ML (ref 0–124)
PARTIAL THROMBOPLASTIN TIME: 32 SEC (ref 24–38)
PLATELET COUNT: 217 X10^3/UL (ref 140–400)
POC GLUCOSE: 147 MG/DL (ref 70–99)
POC GLUCOSE: 171 MG/DL (ref 70–99)
POC GLUCOSE: 173 MG/DL (ref 70–99)
POC GLUCOSE: 235 MG/DL (ref 70–99)
POTASSIUM ISTAT: 3.5 MMOL/L (ref 3.5–5)
PROTHROMBIN TIME PATIENT: 13.8 SEC (ref 11.7–14)
RED BLOOD COUNT: 2.17 X10^6/UL (ref 4.3–5.7)
RED CELL DISTRIBUTION WIDTH: 19.4 % (ref 11.5–14.5)
RETIC COUNT: 0.6 % (ref 0.5–2.5)
SODIUM SERPL-SCNC: 142 MMOL/L (ref 135–145)
TOT CO2 ISTAT: 28 MMOL/L (ref 23–32)
TROPONIN BY ISTAT: 0.07 NG/ML (ref ?–0.08)
TROPONINI: 0.13 NG/ML (ref 0–0.06)
TROPONINI: 0.83 NG/ML (ref 0–0.06)
TROPONINI: 1.72 NG/ML (ref 0–0.06)
VITAMIN-B12: 736 PG/ML (ref 247–911)

## 2018-02-05 PROCEDURE — 72141 MRI NECK SPINE W/O DYE: CPT

## 2018-02-05 PROCEDURE — 85520 HEPARIN ASSAY: CPT

## 2018-02-05 PROCEDURE — 85025 COMPLETE CBC W/AUTO DIFF WBC: CPT

## 2018-02-05 PROCEDURE — 85730 THROMBOPLASTIN TIME PARTIAL: CPT

## 2018-02-05 PROCEDURE — 83735 ASSAY OF MAGNESIUM: CPT

## 2018-02-05 PROCEDURE — 84484 ASSAY OF TROPONIN QUANT: CPT

## 2018-02-05 PROCEDURE — 85027 COMPLETE CBC AUTOMATED: CPT

## 2018-02-05 PROCEDURE — 85610 PROTHROMBIN TIME: CPT

## 2018-02-05 PROCEDURE — 86920 COMPATIBILITY TEST SPIN: CPT

## 2018-02-05 PROCEDURE — 36415 COLL VENOUS BLD VENIPUNCTURE: CPT

## 2018-02-05 PROCEDURE — 85045 AUTOMATED RETICULOCYTE COUNT: CPT

## 2018-02-05 PROCEDURE — 87040 BLOOD CULTURE FOR BACTERIA: CPT

## 2018-02-05 PROCEDURE — 86900 BLOOD TYPING SEROLOGIC ABO: CPT

## 2018-02-05 PROCEDURE — 93017 CV STRESS TEST TRACING ONLY: CPT

## 2018-02-05 PROCEDURE — 80047 BASIC METABLC PNL IONIZED CA: CPT

## 2018-02-05 PROCEDURE — 93970 EXTREMITY STUDY: CPT

## 2018-02-05 PROCEDURE — 83540 ASSAY OF IRON: CPT

## 2018-02-05 PROCEDURE — 82962 GLUCOSE BLOOD TEST: CPT

## 2018-02-05 PROCEDURE — 71045 X-RAY EXAM CHEST 1 VIEW: CPT

## 2018-02-05 PROCEDURE — 78452 HT MUSCLE IMAGE SPECT MULT: CPT

## 2018-02-05 PROCEDURE — 82746 ASSAY OF FOLIC ACID SERUM: CPT

## 2018-02-05 PROCEDURE — 96375 TX/PRO/DX INJ NEW DRUG ADDON: CPT

## 2018-02-05 PROCEDURE — 83880 ASSAY OF NATRIURETIC PEPTIDE: CPT

## 2018-02-05 PROCEDURE — 99285 EMERGENCY DEPT VISIT HI MDM: CPT

## 2018-02-05 PROCEDURE — 96374 THER/PROPH/DIAG INJ IV PUSH: CPT

## 2018-02-05 PROCEDURE — 83550 IRON BINDING TEST: CPT

## 2018-02-05 PROCEDURE — 82607 VITAMIN B-12: CPT

## 2018-02-05 PROCEDURE — 93005 ELECTROCARDIOGRAM TRACING: CPT

## 2018-02-05 PROCEDURE — 96376 TX/PRO/DX INJ SAME DRUG ADON: CPT

## 2018-02-05 PROCEDURE — 86901 BLOOD TYPING SEROLOGIC RH(D): CPT

## 2018-02-05 PROCEDURE — 86850 RBC ANTIBODY SCREEN: CPT

## 2018-02-05 PROCEDURE — 80069 RENAL FUNCTION PANEL: CPT

## 2018-02-05 RX ADMIN — FUROSEMIDE 1 MG: 80 TABLET ORAL at 17:04

## 2018-02-05 RX ADMIN — CALCITRIOL 1 MCG: 0.25 CAPSULE, LIQUID FILLED ORAL at 17:04

## 2018-02-05 RX ADMIN — ASPIRIN 1 MG: 81 TABLET, COATED ORAL at 17:03

## 2018-02-05 RX ADMIN — PANTOPRAZOLE SODIUM 1 MG: 40 TABLET, DELAYED RELEASE ORAL at 17:04

## 2018-02-05 RX ADMIN — FENTANYL CITRATE 1 MCG: 50 INJECTION INTRAMUSCULAR; INTRAVENOUS at 06:34

## 2018-02-05 RX ADMIN — CEFAZOLIN 1 GM: 330 INJECTION, POWDER, FOR SOLUTION INTRAMUSCULAR; INTRAVENOUS at 17:05

## 2018-02-05 RX ADMIN — INSULIN ASPART 1 UNITS: 100 INJECTION, SOLUTION INTRAVENOUS; SUBCUTANEOUS at 17:09

## 2018-02-05 RX ADMIN — HEPARIN SODIUM AND DEXTROSE 1 MLS/HR: 5000; 5 INJECTION INTRAVENOUS at 20:34

## 2018-02-05 RX ADMIN — FUROSEMIDE 1 MG: 80 TABLET ORAL at 10:00

## 2018-02-05 RX ADMIN — SIMVASTATIN 1 MG: 10 TABLET, FILM COATED ORAL at 20:46

## 2018-02-05 RX ADMIN — DARBEPOETIN ALFA 1 MCG: 100 INJECTION, SOLUTION INTRAVENOUS; SUBCUTANEOUS at 20:47

## 2018-02-05 RX ADMIN — POLYETHYLENE GLYCOL 3350 1 GM: 17 POWDER, FOR SOLUTION ORAL at 17:04

## 2018-02-05 RX ADMIN — INSULIN ASPART 1 UNITS: 100 INJECTION, SOLUTION INTRAVENOUS; SUBCUTANEOUS at 12:00

## 2018-02-05 RX ADMIN — FENTANYL CITRATE 1 MCG: 50 INJECTION INTRAMUSCULAR; INTRAVENOUS at 05:45

## 2018-02-05 RX ADMIN — FENTANYL CITRATE 1 MCG: 50 INJECTION INTRAMUSCULAR; INTRAVENOUS at 14:31

## 2018-02-05 RX ADMIN — SEVELAMER CARBONATE 1 MG: 800 TABLET, FILM COATED ORAL at 17:04

## 2018-02-05 RX ADMIN — SEVELAMER CARBONATE 1 MG: 800 TABLET, FILM COATED ORAL at 20:45

## 2018-02-05 RX ADMIN — ASPIRIN 81 MG 1 MG: 81 TABLET ORAL at 05:45

## 2018-02-05 RX ADMIN — Medication 1 TAB: at 17:04

## 2018-02-05 RX ADMIN — FENTANYL CITRATE 1 MCG: 50 INJECTION INTRAMUSCULAR; INTRAVENOUS at 20:43

## 2018-02-05 RX ADMIN — FENTANYL CITRATE 1 MCG: 50 INJECTION INTRAMUSCULAR; INTRAVENOUS at 10:45

## 2018-02-05 RX ADMIN — PANTOPRAZOLE SODIUM 1 MG: 40 TABLET, DELAYED RELEASE ORAL at 10:00

## 2018-02-06 LAB
ADD MAN DIFF?: NO
ALBUMIN SERPL-MCNC: 2.5 G/DL (ref 3.4–5)
ANION GAP SERPL CALC-SCNC: 7 MMOL/L (ref 6–14)
BASO #: 0 X10^3/UL (ref 0–0.2)
BASO %: 0 % (ref 0–3)
BLOOD UREA NITROGEN: 33 MG/DL (ref 8–26)
CALCIUM: 8.4 MG/DL (ref 8.5–10.1)
CHLORIDE: 99 MMOL/L (ref 98–107)
CO2 SERPL-SCNC: 32 MMOL/L (ref 21–32)
CREAT SERPL-MCNC: 3.7 MG/DL (ref 0.7–1.3)
EOS #: 0.2 X10^3/UL (ref 0–0.7)
EOS %: 2 % (ref 0–3)
GFR SERPLBLD BASED ON 1.73 SQ M-ARVRAT: 19.9 ML/MIN
GLUCOSE SERPL-MCNC: 118 MG/DL (ref 70–99)
HCG SERPL-ACNC: 7.9 X10^3/UL (ref 4–11)
HEMATOCRIT: 18.9 % (ref 39–53)
HEMOGLOBIN: 6.4 G/DL (ref 13–17.5)
LYMPH #: 1.1 X10^3/UL (ref 1–4.8)
LYMPH %: 14 % (ref 24–48)
MAGNESIUM: 2.3 MG/DL (ref 1.8–2.4)
MEAN CORPUSCULAR HEMOGLOBIN: 33 PG (ref 25–35)
MEAN CORPUSCULAR HGB CONC: 34 G/DL (ref 31–37)
MEAN CORPUSCULAR VOLUME: 98 FL (ref 79–100)
MONO #: 0.8 X10^3/UL (ref 0–1.1)
MONO %: 10 % (ref 0–9)
NEUT #: 5.8 X10^3UL (ref 1.8–7.7)
NEUT %: 74 % (ref 31–73)
PHOSPHORUS: 2.8 MG/DL (ref 2.6–4.7)
PLATELET COUNT: 190 X10^3/UL (ref 140–400)
POC GLUCOSE: 120 MG/DL (ref 70–99)
POC GLUCOSE: 126 MG/DL (ref 70–99)
POC GLUCOSE: 148 MG/DL (ref 70–99)
POTASSIUM SERPL-SCNC: 3.3 MMOL/L (ref 3.5–5.1)
RED BLOOD COUNT: 1.93 X10^6/UL (ref 4.3–5.7)
RED CELL DISTRIBUTION WIDTH: 19.5 % (ref 11.5–14.5)
SODIUM: 138 MMOL/L (ref 136–145)
TROPONINI: 1.4 NG/ML (ref 0–0.06)
UNFRACTIONATED HEPARIN TESTING: 0.13 IU/ML (ref 0.3–0.7)
UNFRACTIONATED HEPARIN TESTING: 0.21 IU/ML (ref 0.3–0.7)

## 2018-02-06 RX ADMIN — OXYCODONE HYDROCHLORIDE AND ACETAMINOPHEN 1 TAB: 5; 325 TABLET ORAL at 06:18

## 2018-02-06 RX ADMIN — SEVELAMER CARBONATE 1 MG: 800 TABLET, FILM COATED ORAL at 22:07

## 2018-02-06 RX ADMIN — Medication 1 MLS/HR: at 13:12

## 2018-02-06 RX ADMIN — Medication 1 CAP: at 22:08

## 2018-02-06 RX ADMIN — REGADENOSON 1 MG: 0.08 INJECTION, SOLUTION INTRAVENOUS at 11:47

## 2018-02-06 RX ADMIN — HEPARIN SODIUM 1 UNIT: 1000 INJECTION, SOLUTION INTRAVENOUS; SUBCUTANEOUS at 03:31

## 2018-02-06 RX ADMIN — CEFAZOLIN 1 GM: 330 INJECTION, POWDER, FOR SOLUTION INTRAMUSCULAR; INTRAVENOUS at 15:36

## 2018-02-06 RX ADMIN — SIMVASTATIN 1 MG: 10 TABLET, FILM COATED ORAL at 22:07

## 2018-02-06 RX ADMIN — INSULIN ASPART 1 UNITS: 100 INJECTION, SOLUTION INTRAVENOUS; SUBCUTANEOUS at 17:26

## 2018-02-06 RX ADMIN — POLYETHYLENE GLYCOL 3350 1 GM: 17 POWDER, FOR SOLUTION ORAL at 12:37

## 2018-02-06 RX ADMIN — ASPIRIN 1 MG: 81 TABLET, COATED ORAL at 12:36

## 2018-02-06 RX ADMIN — PANTOPRAZOLE SODIUM 1 MG: 40 TABLET, DELAYED RELEASE ORAL at 15:36

## 2018-02-06 RX ADMIN — OXYCODONE HYDROCHLORIDE AND ACETAMINOPHEN 1 TAB: 5; 325 TABLET ORAL at 19:57

## 2018-02-06 RX ADMIN — FUROSEMIDE 1 MG: 80 TABLET ORAL at 09:00

## 2018-02-06 RX ADMIN — CALCITRIOL 1 MCG: 0.25 CAPSULE, LIQUID FILLED ORAL at 12:36

## 2018-02-06 RX ADMIN — INSULIN ASPART 1 UNITS: 100 INJECTION, SOLUTION INTRAVENOUS; SUBCUTANEOUS at 08:00

## 2018-02-06 RX ADMIN — POTASSIUM CHLORIDE 1 MEQ: 1500 TABLET, EXTENDED RELEASE ORAL at 12:37

## 2018-02-06 RX ADMIN — SEVELAMER CARBONATE 1 MG: 800 TABLET, FILM COATED ORAL at 13:12

## 2018-02-06 RX ADMIN — INSULIN ASPART 1 UNITS: 100 INJECTION, SOLUTION INTRAVENOUS; SUBCUTANEOUS at 11:22

## 2018-02-06 RX ADMIN — FUROSEMIDE 1 MG: 80 TABLET ORAL at 15:36

## 2018-02-06 RX ADMIN — OXYCODONE HYDROCHLORIDE AND ACETAMINOPHEN 1 TAB: 5; 325 TABLET ORAL at 15:36

## 2018-02-06 RX ADMIN — Medication 1 TAB: at 12:36

## 2018-02-06 RX ADMIN — SEVELAMER CARBONATE 1 MG: 800 TABLET, FILM COATED ORAL at 09:00

## 2018-02-06 RX ADMIN — PANTOPRAZOLE SODIUM 1 MG: 40 TABLET, DELAYED RELEASE ORAL at 07:30

## 2018-02-07 LAB
ALBUMIN SERPL-MCNC: 2.7 G/DL (ref 3.4–5)
ANION GAP SERPL CALC-SCNC: 9 MMOL/L (ref 6–14)
BLOOD UREA NITROGEN: 45 MG/DL (ref 8–26)
CALCIUM: 8.7 MG/DL (ref 8.5–10.1)
CHLORIDE: 97 MMOL/L (ref 98–107)
CO2 SERPL-SCNC: 31 MMOL/L (ref 21–32)
CREAT SERPL-MCNC: 5.2 MG/DL (ref 0.7–1.3)
GFR SERPLBLD BASED ON 1.73 SQ M-ARVRAT: 13.4 ML/MIN
GLUCOSE SERPL-MCNC: 115 MG/DL (ref 70–99)
HCG SERPL-ACNC: 7 X10^3/UL (ref 4–11)
HEMATOCRIT: 21.1 % (ref 39–53)
HEMOGLOBIN: 7 G/DL (ref 13–17.5)
IMMEDIATE SPIN CROSSMATCH: 1 1
MAGNESIUM: 2.4 MG/DL (ref 1.8–2.4)
MEAN CORPUSCULAR HEMOGLOBIN: 33 PG (ref 25–35)
MEAN CORPUSCULAR HGB CONC: 33 G/DL (ref 31–37)
MEAN CORPUSCULAR VOLUME: 98 FL (ref 79–100)
PHOSPHORUS: 3.5 MG/DL (ref 2.6–4.7)
PLATELET COUNT: 194 X10^3/UL (ref 140–400)
POC GLUCOSE: 100 MG/DL (ref 70–99)
POC GLUCOSE: 122 MG/DL (ref 70–99)
POC GLUCOSE: 229 MG/DL (ref 70–99)
POTASSIUM SERPL-SCNC: 3.8 MMOL/L (ref 3.5–5.1)
RED BLOOD COUNT: 2.15 X10^6/UL (ref 4.3–5.7)
RED CELL DISTRIBUTION WIDTH: 19.4 % (ref 11.5–14.5)
SODIUM: 137 MMOL/L (ref 136–145)

## 2018-02-07 RX ADMIN — Medication 1 CAP: at 21:18

## 2018-02-07 RX ADMIN — FUROSEMIDE 1 MG: 80 TABLET ORAL at 18:38

## 2018-02-07 RX ADMIN — MORPHINE SULFATE 1 MG: 4 INJECTION, SOLUTION INTRAMUSCULAR; INTRAVENOUS at 15:08

## 2018-02-07 RX ADMIN — MORPHINE SULFATE 1 MG: 4 INJECTION, SOLUTION INTRAMUSCULAR; INTRAVENOUS at 00:37

## 2018-02-07 RX ADMIN — OXYCODONE HYDROCHLORIDE AND ACETAMINOPHEN 1 TAB: 5; 325 TABLET ORAL at 00:00

## 2018-02-07 RX ADMIN — Medication 1 CAP: at 13:00

## 2018-02-07 RX ADMIN — MORPHINE SULFATE 1 MG: 4 INJECTION, SOLUTION INTRAMUSCULAR; INTRAVENOUS at 21:52

## 2018-02-07 RX ADMIN — INSULIN ASPART 1 UNITS: 100 INJECTION, SOLUTION INTRAVENOUS; SUBCUTANEOUS at 08:00

## 2018-02-07 RX ADMIN — Medication 1 TAB: at 13:01

## 2018-02-07 RX ADMIN — FUROSEMIDE 1 MG: 80 TABLET ORAL at 13:00

## 2018-02-07 RX ADMIN — POLYETHYLENE GLYCOL 3350 1 GM: 17 POWDER, FOR SOLUTION ORAL at 13:01

## 2018-02-07 RX ADMIN — CALCITRIOL 1 MCG: 0.25 CAPSULE, LIQUID FILLED ORAL at 13:01

## 2018-02-07 RX ADMIN — PANTOPRAZOLE SODIUM 1 MG: 40 TABLET, DELAYED RELEASE ORAL at 13:01

## 2018-02-07 RX ADMIN — INSULIN ASPART 1 UNITS: 100 INJECTION, SOLUTION INTRAVENOUS; SUBCUTANEOUS at 17:00

## 2018-02-07 RX ADMIN — SEVELAMER CARBONATE 1 MG: 800 TABLET, FILM COATED ORAL at 13:00

## 2018-02-07 RX ADMIN — INSULIN ASPART 1 UNITS: 100 INJECTION, SOLUTION INTRAVENOUS; SUBCUTANEOUS at 12:00

## 2018-02-07 RX ADMIN — SIMVASTATIN 1 MG: 10 TABLET, FILM COATED ORAL at 21:18

## 2018-02-07 RX ADMIN — CEFAZOLIN 1 GM: 330 INJECTION, POWDER, FOR SOLUTION INTRAMUSCULAR; INTRAVENOUS at 18:39

## 2018-02-07 RX ADMIN — MORPHINE SULFATE 1 MG: 4 INJECTION, SOLUTION INTRAMUSCULAR; INTRAVENOUS at 02:47

## 2018-02-07 RX ADMIN — SODIUM CHLORIDE, SODIUM LACTATE, POTASSIUM CHLORIDE, AND CALCIUM CHLORIDE 1 MLS/HR: .6; .31; .03; .02 INJECTION, SOLUTION INTRAVENOUS at 06:02

## 2018-02-07 RX ADMIN — ASPIRIN 1 MG: 81 TABLET, COATED ORAL at 13:00

## 2018-02-07 RX ADMIN — SEVELAMER CARBONATE 1 MG: 800 TABLET, FILM COATED ORAL at 14:00

## 2018-02-07 RX ADMIN — SEVELAMER CARBONATE 1 MG: 800 TABLET, FILM COATED ORAL at 21:18

## 2018-02-07 RX ADMIN — PANTOPRAZOLE SODIUM 1 MG: 40 TABLET, DELAYED RELEASE ORAL at 18:38

## 2018-02-07 RX ADMIN — OXYCODONE HYDROCHLORIDE AND ACETAMINOPHEN 1 TAB: 5; 325 TABLET ORAL at 06:23

## 2018-02-08 LAB
ALBUMIN SERPL-MCNC: 2.6 G/DL (ref 3.4–5)
ANION GAP SERPL CALC-SCNC: 11 MMOL/L (ref 6–14)
BLOOD UREA NITROGEN: 32 MG/DL (ref 8–26)
CALCIUM: 9.5 MG/DL (ref 8.5–10.1)
CHLORIDE: 101 MMOL/L (ref 98–107)
CO2 SERPL-SCNC: 27 MMOL/L (ref 21–32)
CREAT SERPL-MCNC: 4.2 MG/DL (ref 0.7–1.3)
GFR SERPLBLD BASED ON 1.73 SQ M-ARVRAT: 17.2 ML/MIN
GLUCOSE SERPL-MCNC: 166 MG/DL (ref 70–99)
HCG SERPL-ACNC: 6.3 X10^3/UL (ref 4–11)
HEMATOCRIT: 22.9 % (ref 39–53)
HEMOGLOBIN: 7.5 G/DL (ref 13–17.5)
MAGNESIUM: 2.3 MG/DL (ref 1.8–2.4)
MEAN CORPUSCULAR HEMOGLOBIN: 32 PG (ref 25–35)
MEAN CORPUSCULAR HGB CONC: 33 G/DL (ref 31–37)
MEAN CORPUSCULAR VOLUME: 96 FL (ref 79–100)
PHOSPHORUS: 2.9 MG/DL (ref 2.6–4.7)
PLATELET COUNT: 189 X10^3/UL (ref 140–400)
POC GLUCOSE: 105 MG/DL (ref 70–99)
POC GLUCOSE: 116 MG/DL (ref 70–99)
POC GLUCOSE: 156 MG/DL (ref 70–99)
POC GLUCOSE: 162 MG/DL (ref 70–99)
POC GLUCOSE: 163 MG/DL (ref 70–99)
POTASSIUM SERPL-SCNC: 4.3 MMOL/L (ref 3.5–5.1)
RED BLOOD COUNT: 2.37 X10^6/UL (ref 4.3–5.7)
RED CELL DISTRIBUTION WIDTH: 21 % (ref 11.5–14.5)
SODIUM: 139 MMOL/L (ref 136–145)

## 2018-02-08 PROCEDURE — 5A1D70Z PERFORMANCE OF URINARY FILTRATION, INTERMITTENT, LESS THAN 6 HOURS PER DAY: ICD-10-PCS

## 2018-02-08 PROCEDURE — 03180JD BYPASS LEFT BRACHIAL ARTERY TO UPPER ARM VEIN WITH SYNTHETIC SUBSTITUTE, OPEN APPROACH: ICD-10-PCS

## 2018-02-08 PROCEDURE — 30233N1 TRANSFUSION OF NONAUTOLOGOUS RED BLOOD CELLS INTO PERIPHERAL VEIN, PERCUTANEOUS APPROACH: ICD-10-PCS

## 2018-02-08 RX ADMIN — Medication 1 CAP: at 19:47

## 2018-02-08 RX ADMIN — SEVELAMER CARBONATE 1 MG: 800 TABLET, FILM COATED ORAL at 14:00

## 2018-02-08 RX ADMIN — OXYCODONE HYDROCHLORIDE AND ACETAMINOPHEN 1 TAB: 5; 325 TABLET ORAL at 17:50

## 2018-02-08 RX ADMIN — FUROSEMIDE 1 MG: 80 TABLET ORAL at 11:51

## 2018-02-08 RX ADMIN — Medication 1 TAB: at 11:52

## 2018-02-08 RX ADMIN — METOPROLOL SUCCINATE 1 MG: 25 TABLET, EXTENDED RELEASE ORAL at 11:52

## 2018-02-08 RX ADMIN — BACITRACIN 1: 5000 INJECTION, POWDER, FOR SOLUTION INTRAMUSCULAR at 09:05

## 2018-02-08 RX ADMIN — FENTANYL CITRATE 1 MCG: 50 INJECTION INTRAMUSCULAR; INTRAVENOUS at 09:51

## 2018-02-08 RX ADMIN — MORPHINE SULFATE 1 MG: 4 INJECTION, SOLUTION INTRAMUSCULAR; INTRAVENOUS at 03:52

## 2018-02-08 RX ADMIN — SEVELAMER CARBONATE 1 MG: 800 TABLET, FILM COATED ORAL at 11:52

## 2018-02-08 RX ADMIN — MORPHINE SULFATE 1 MG: 4 INJECTION, SOLUTION INTRAMUSCULAR; INTRAVENOUS at 19:46

## 2018-02-08 RX ADMIN — FUROSEMIDE 1 MG: 80 TABLET ORAL at 17:58

## 2018-02-08 RX ADMIN — SODIUM CHLORIDE, SODIUM LACTATE, POTASSIUM CHLORIDE, AND CALCIUM CHLORIDE 1 MLS/HR: .6; .31; .03; .02 INJECTION, SOLUTION INTRAVENOUS at 08:06

## 2018-02-08 RX ADMIN — INSULIN ASPART 1 UNITS: 100 INJECTION, SOLUTION INTRAVENOUS; SUBCUTANEOUS at 18:09

## 2018-02-08 RX ADMIN — FENTANYL CITRATE 1 MCG: 50 INJECTION INTRAMUSCULAR; INTRAVENOUS at 10:04

## 2018-02-08 RX ADMIN — INSULIN ASPART 1 UNITS: 100 INJECTION, SOLUTION INTRAVENOUS; SUBCUTANEOUS at 12:11

## 2018-02-08 RX ADMIN — SIMVASTATIN 1 MG: 10 TABLET, FILM COATED ORAL at 19:47

## 2018-02-08 RX ADMIN — LIDOCAINE HYDROCHLORIDE 1 ML: 10 INJECTION, SOLUTION INFILTRATION; PERINEURAL at 09:03

## 2018-02-08 RX ADMIN — INSULIN ASPART 1 UNITS: 100 INJECTION, SOLUTION INTRAVENOUS; SUBCUTANEOUS at 08:00

## 2018-02-08 RX ADMIN — POLYETHYLENE GLYCOL 3350 1 GM: 17 POWDER, FOR SOLUTION ORAL at 11:50

## 2018-02-08 RX ADMIN — CEFAZOLIN 1 GM: 330 INJECTION, POWDER, FOR SOLUTION INTRAMUSCULAR; INTRAVENOUS at 17:59

## 2018-02-08 RX ADMIN — PANTOPRAZOLE SODIUM 1 MG: 40 TABLET, DELAYED RELEASE ORAL at 17:58

## 2018-02-08 RX ADMIN — CALCITRIOL 1 MCG: 0.25 CAPSULE, LIQUID FILLED ORAL at 11:50

## 2018-02-08 RX ADMIN — Medication 1 EACH: at 09:28

## 2018-02-08 RX ADMIN — PANTOPRAZOLE SODIUM 1 MG: 40 TABLET, DELAYED RELEASE ORAL at 11:51

## 2018-02-08 RX ADMIN — Medication 1 CAP: at 11:52

## 2018-02-08 RX ADMIN — SEVELAMER CARBONATE 1 MG: 800 TABLET, FILM COATED ORAL at 19:46

## 2018-02-08 RX ADMIN — ASPIRIN 1 MG: 81 TABLET, COATED ORAL at 11:52

## 2018-02-08 RX ADMIN — MORPHINE SULFATE 1 MG: 4 INJECTION, SOLUTION INTRAMUSCULAR; INTRAVENOUS at 22:44

## 2018-02-08 RX ADMIN — BACITRACIN 1 MLS/HR: 5000 INJECTION, POWDER, FOR SOLUTION INTRAMUSCULAR at 08:18

## 2018-02-09 LAB
ALBUMIN SERPL-MCNC: 2.7 G/DL (ref 3.4–5)
ANION GAP SERPL CALC-SCNC: 8 MMOL/L (ref 6–14)
BLOOD UREA NITROGEN: 40 MG/DL (ref 8–26)
CALCIUM: 9.1 MG/DL (ref 8.5–10.1)
CHLORIDE: 98 MMOL/L (ref 98–107)
CO2 SERPL-SCNC: 31 MMOL/L (ref 21–32)
CREAT SERPL-MCNC: 5.9 MG/DL (ref 0.7–1.3)
GFR SERPLBLD BASED ON 1.73 SQ M-ARVRAT: 11.6 ML/MIN
GLUCOSE SERPL-MCNC: 105 MG/DL (ref 70–99)
HCG SERPL-ACNC: 7.1 X10^3/UL (ref 4–11)
HEMATOCRIT: 21.1 % (ref 39–53)
HEMOGLOBIN: 7.2 G/DL (ref 13–17.5)
MAGNESIUM: 2.3 MG/DL (ref 1.8–2.4)
MEAN CORPUSCULAR HEMOGLOBIN: 33 PG (ref 25–35)
MEAN CORPUSCULAR HGB CONC: 34 G/DL (ref 31–37)
MEAN CORPUSCULAR VOLUME: 97 FL (ref 79–100)
PHOSPHORUS: 3.6 MG/DL (ref 2.6–4.7)
PLATELET COUNT: 176 X10^3/UL (ref 140–400)
POC GLUCOSE: 120 MG/DL (ref 70–99)
POC GLUCOSE: 147 MG/DL (ref 70–99)
POC GLUCOSE: 234 MG/DL (ref 70–99)
POTASSIUM SERPL-SCNC: 4.5 MMOL/L (ref 3.5–5.1)
RED BLOOD COUNT: 2.18 X10^6/UL (ref 4.3–5.7)
RED CELL DISTRIBUTION WIDTH: 20.8 % (ref 11.5–14.5)
SODIUM: 137 MMOL/L (ref 136–145)

## 2018-02-09 RX ADMIN — SEVELAMER CARBONATE 1 MG: 800 TABLET, FILM COATED ORAL at 10:00

## 2018-02-09 RX ADMIN — MORPHINE SULFATE 1 MG: 4 INJECTION, SOLUTION INTRAMUSCULAR; INTRAVENOUS at 21:22

## 2018-02-09 RX ADMIN — MORPHINE SULFATE 1 MG: 4 INJECTION, SOLUTION INTRAMUSCULAR; INTRAVENOUS at 10:02

## 2018-02-09 RX ADMIN — INSULIN ASPART 1 UNITS: 100 INJECTION, SOLUTION INTRAVENOUS; SUBCUTANEOUS at 07:50

## 2018-02-09 RX ADMIN — SEVELAMER CARBONATE 1 MG: 800 TABLET, FILM COATED ORAL at 19:43

## 2018-02-09 RX ADMIN — OXYCODONE HYDROCHLORIDE AND ACETAMINOPHEN 1 TAB: 5; 325 TABLET ORAL at 07:50

## 2018-02-09 RX ADMIN — OXYCODONE HYDROCHLORIDE AND ACETAMINOPHEN 1 TAB: 5; 325 TABLET ORAL at 19:44

## 2018-02-09 RX ADMIN — Medication 1 MLS/HR: at 10:02

## 2018-02-09 RX ADMIN — CEFAZOLIN 1 GM: 330 INJECTION, POWDER, FOR SOLUTION INTRAMUSCULAR; INTRAVENOUS at 15:35

## 2018-02-09 RX ADMIN — MORPHINE SULFATE 1 MG: 4 INJECTION, SOLUTION INTRAMUSCULAR; INTRAVENOUS at 16:49

## 2018-02-09 RX ADMIN — SIMVASTATIN 1 MG: 10 TABLET, FILM COATED ORAL at 19:43

## 2018-02-09 RX ADMIN — FUROSEMIDE 1 MG: 80 TABLET ORAL at 10:01

## 2018-02-09 RX ADMIN — POLYETHYLENE GLYCOL 3350 1 GM: 17 POWDER, FOR SOLUTION ORAL at 09:00

## 2018-02-09 RX ADMIN — SEVELAMER CARBONATE 1 MG: 800 TABLET, FILM COATED ORAL at 15:35

## 2018-02-09 RX ADMIN — PANTOPRAZOLE SODIUM 1 MG: 40 TABLET, DELAYED RELEASE ORAL at 15:35

## 2018-02-09 RX ADMIN — METOPROLOL SUCCINATE 1 MG: 25 TABLET, EXTENDED RELEASE ORAL at 10:01

## 2018-02-09 RX ADMIN — Medication 1 CAP: at 10:00

## 2018-02-09 RX ADMIN — Medication 1 CAP: at 19:43

## 2018-02-09 RX ADMIN — OXYCODONE HYDROCHLORIDE AND ACETAMINOPHEN 1 TAB: 5; 325 TABLET ORAL at 15:35

## 2018-02-09 RX ADMIN — CALCITRIOL 1 MCG: 0.25 CAPSULE, LIQUID FILLED ORAL at 10:01

## 2018-02-09 RX ADMIN — OXYCODONE HYDROCHLORIDE AND ACETAMINOPHEN 1 TAB: 5; 325 TABLET ORAL at 01:00

## 2018-02-09 RX ADMIN — PANTOPRAZOLE SODIUM 1 MG: 40 TABLET, DELAYED RELEASE ORAL at 10:00

## 2018-02-09 RX ADMIN — INSULIN ASPART 1 UNITS: 100 INJECTION, SOLUTION INTRAVENOUS; SUBCUTANEOUS at 12:00

## 2018-02-09 RX ADMIN — ASPIRIN 1 MG: 81 TABLET, COATED ORAL at 10:00

## 2018-02-09 RX ADMIN — Medication 1 TAB: at 10:00

## 2018-02-09 RX ADMIN — INSULIN ASPART 1 UNITS: 100 INJECTION, SOLUTION INTRAVENOUS; SUBCUTANEOUS at 16:51

## 2018-02-09 RX ADMIN — FUROSEMIDE 1 MG: 80 TABLET ORAL at 15:35

## 2018-02-10 LAB
ALBUMIN SERPL-MCNC: 2.6 G/DL (ref 3.4–5)
ANION GAP SERPL CALC-SCNC: 9 MMOL/L (ref 6–14)
BLOOD UREA NITROGEN: 25 MG/DL (ref 8–26)
CALCIUM: 9 MG/DL (ref 8.5–10.1)
CHLORIDE: 100 MMOL/L (ref 98–107)
CO2 SERPL-SCNC: 29 MMOL/L (ref 21–32)
CREAT SERPL-MCNC: 4.4 MG/DL (ref 0.7–1.3)
GFR SERPLBLD BASED ON 1.73 SQ M-ARVRAT: 16.3 ML/MIN
GLUCOSE SERPL-MCNC: 130 MG/DL (ref 70–99)
MAGNESIUM: 2.3 MG/DL (ref 1.8–2.4)
PHOSPHORUS: 3.4 MG/DL (ref 2.6–4.7)
POC GLUCOSE: 122 MG/DL (ref 70–99)
POC GLUCOSE: 126 MG/DL (ref 70–99)
POC GLUCOSE: 131 MG/DL (ref 70–99)
POC GLUCOSE: 181 MG/DL (ref 70–99)
POTASSIUM SERPL-SCNC: 4.4 MMOL/L (ref 3.5–5.1)
SODIUM: 138 MMOL/L (ref 136–145)

## 2018-02-10 RX ADMIN — POLYETHYLENE GLYCOL 3350 1 GM: 17 POWDER, FOR SOLUTION ORAL at 08:36

## 2018-02-10 RX ADMIN — SIMVASTATIN 1 MG: 10 TABLET, FILM COATED ORAL at 21:12

## 2018-02-10 RX ADMIN — CALCITRIOL 1 MCG: 0.25 CAPSULE, LIQUID FILLED ORAL at 08:37

## 2018-02-10 RX ADMIN — FUROSEMIDE 1 MG: 80 TABLET ORAL at 16:41

## 2018-02-10 RX ADMIN — ASPIRIN 1 MG: 81 TABLET, COATED ORAL at 08:37

## 2018-02-10 RX ADMIN — Medication 1 CAP: at 08:37

## 2018-02-10 RX ADMIN — PANTOPRAZOLE SODIUM 1 MG: 40 TABLET, DELAYED RELEASE ORAL at 08:37

## 2018-02-10 RX ADMIN — SEVELAMER CARBONATE 1 MG: 800 TABLET, FILM COATED ORAL at 12:15

## 2018-02-10 RX ADMIN — MORPHINE SULFATE 1 MG: 4 INJECTION, SOLUTION INTRAMUSCULAR; INTRAVENOUS at 08:36

## 2018-02-10 RX ADMIN — SEVELAMER CARBONATE 1 MG: 800 TABLET, FILM COATED ORAL at 17:05

## 2018-02-10 RX ADMIN — MORPHINE SULFATE 1 MG: 4 INJECTION, SOLUTION INTRAMUSCULAR; INTRAVENOUS at 19:47

## 2018-02-10 RX ADMIN — OXYCODONE HYDROCHLORIDE AND ACETAMINOPHEN 1 TAB: 5; 325 TABLET ORAL at 01:26

## 2018-02-10 RX ADMIN — SEVELAMER CARBONATE 1 MG: 800 TABLET, FILM COATED ORAL at 08:37

## 2018-02-10 RX ADMIN — Medication 1 TAB: at 08:38

## 2018-02-10 RX ADMIN — INSULIN ASPART 1 UNITS: 100 INJECTION, SOLUTION INTRAVENOUS; SUBCUTANEOUS at 12:44

## 2018-02-10 RX ADMIN — OXYCODONE HYDROCHLORIDE AND ACETAMINOPHEN 1 TAB: 5; 325 TABLET ORAL at 05:53

## 2018-02-10 RX ADMIN — CEFAZOLIN 1 GM: 330 INJECTION, POWDER, FOR SOLUTION INTRAMUSCULAR; INTRAVENOUS at 16:41

## 2018-02-10 RX ADMIN — Medication 1 CAP: at 23:26

## 2018-02-10 RX ADMIN — INSULIN ASPART 1 UNITS: 100 INJECTION, SOLUTION INTRAVENOUS; SUBCUTANEOUS at 08:00

## 2018-02-10 RX ADMIN — SEVELAMER CARBONATE 1 MG: 800 TABLET, FILM COATED ORAL at 08:55

## 2018-02-10 RX ADMIN — FUROSEMIDE 1 MG: 80 TABLET ORAL at 08:37

## 2018-02-10 RX ADMIN — OXYCODONE HYDROCHLORIDE AND ACETAMINOPHEN 1 TAB: 5; 325 TABLET ORAL at 14:12

## 2018-02-10 RX ADMIN — SEVELAMER CARBONATE 1 MG: 800 TABLET, FILM COATED ORAL at 09:05

## 2018-02-10 RX ADMIN — PANTOPRAZOLE SODIUM 1 MG: 40 TABLET, DELAYED RELEASE ORAL at 16:41

## 2018-02-10 RX ADMIN — INSULIN ASPART 1 UNITS: 100 INJECTION, SOLUTION INTRAVENOUS; SUBCUTANEOUS at 16:41

## 2018-02-10 RX ADMIN — METOPROLOL SUCCINATE 1 MG: 25 TABLET, EXTENDED RELEASE ORAL at 08:38

## 2018-02-11 LAB
ALBUMIN SERPL-MCNC: 2.8 G/DL (ref 3.4–5)
ANION GAP SERPL CALC-SCNC: 12 MMOL/L (ref 6–14)
BLOOD UREA NITROGEN: 40 MG/DL (ref 8–26)
CALCIUM: 9.9 MG/DL (ref 8.5–10.1)
CHLORIDE: 98 MMOL/L (ref 98–107)
CO2 SERPL-SCNC: 27 MMOL/L (ref 21–32)
CREAT SERPL-MCNC: 6 MG/DL (ref 0.7–1.3)
GFR SERPLBLD BASED ON 1.73 SQ M-ARVRAT: 11.4 ML/MIN
GLUCOSE SERPL-MCNC: 106 MG/DL (ref 70–99)
PHOSPHORUS: 4.2 MG/DL (ref 2.6–4.7)
POC GLUCOSE: 109 MG/DL (ref 70–99)
POC GLUCOSE: 142 MG/DL (ref 70–99)
POC GLUCOSE: 156 MG/DL (ref 70–99)
POC GLUCOSE: 99 MG/DL (ref 70–99)
POTASSIUM SERPL-SCNC: 4.4 MMOL/L (ref 3.5–5.1)
SODIUM: 137 MMOL/L (ref 136–145)

## 2018-02-11 RX ADMIN — INSULIN ASPART 1 UNITS: 100 INJECTION, SOLUTION INTRAVENOUS; SUBCUTANEOUS at 16:48

## 2018-02-11 RX ADMIN — MORPHINE SULFATE 1 MG: 4 INJECTION, SOLUTION INTRAMUSCULAR; INTRAVENOUS at 20:00

## 2018-02-11 RX ADMIN — FUROSEMIDE 1 MG: 80 TABLET ORAL at 09:05

## 2018-02-11 RX ADMIN — OXYCODONE HYDROCHLORIDE AND ACETAMINOPHEN 1 TAB: 5; 325 TABLET ORAL at 13:14

## 2018-02-11 RX ADMIN — SIMVASTATIN 1 MG: 10 TABLET, FILM COATED ORAL at 20:00

## 2018-02-11 RX ADMIN — FUROSEMIDE 1 MG: 80 TABLET ORAL at 16:29

## 2018-02-11 RX ADMIN — SEVELAMER CARBONATE 1 MG: 800 TABLET, FILM COATED ORAL at 12:02

## 2018-02-11 RX ADMIN — Medication 1 CAP: at 09:06

## 2018-02-11 RX ADMIN — INSULIN ASPART 1 UNITS: 100 INJECTION, SOLUTION INTRAVENOUS; SUBCUTANEOUS at 12:09

## 2018-02-11 RX ADMIN — SEVELAMER CARBONATE 1 MG: 800 TABLET, FILM COATED ORAL at 09:05

## 2018-02-11 RX ADMIN — POLYETHYLENE GLYCOL 3350 1 GM: 17 POWDER, FOR SOLUTION ORAL at 09:05

## 2018-02-11 RX ADMIN — METOPROLOL SUCCINATE 1 MG: 25 TABLET, EXTENDED RELEASE ORAL at 09:07

## 2018-02-11 RX ADMIN — INSULIN ASPART 1 UNITS: 100 INJECTION, SOLUTION INTRAVENOUS; SUBCUTANEOUS at 07:29

## 2018-02-11 RX ADMIN — PANTOPRAZOLE SODIUM 1 MG: 40 TABLET, DELAYED RELEASE ORAL at 07:27

## 2018-02-11 RX ADMIN — OXYCODONE HYDROCHLORIDE AND ACETAMINOPHEN 1 TAB: 5; 325 TABLET ORAL at 09:07

## 2018-02-11 RX ADMIN — MORPHINE SULFATE 1 MG: 4 INJECTION, SOLUTION INTRAMUSCULAR; INTRAVENOUS at 02:26

## 2018-02-11 RX ADMIN — SEVELAMER CARBONATE 1 MG: 800 TABLET, FILM COATED ORAL at 16:54

## 2018-02-11 RX ADMIN — PANTOPRAZOLE SODIUM 1 MG: 40 TABLET, DELAYED RELEASE ORAL at 16:29

## 2018-02-11 RX ADMIN — Medication 1 CAP: at 20:00

## 2018-02-11 RX ADMIN — CALCITRIOL 1 MCG: 0.25 CAPSULE, LIQUID FILLED ORAL at 09:05

## 2018-02-11 RX ADMIN — CEFAZOLIN 1 GM: 330 INJECTION, POWDER, FOR SOLUTION INTRAMUSCULAR; INTRAVENOUS at 16:29

## 2018-02-11 RX ADMIN — MORPHINE SULFATE 1 MG: 4 INJECTION, SOLUTION INTRAMUSCULAR; INTRAVENOUS at 22:12

## 2018-02-11 RX ADMIN — Medication 1 TAB: at 09:06

## 2018-02-11 RX ADMIN — OXYCODONE HYDROCHLORIDE AND ACETAMINOPHEN 1 TAB: 5; 325 TABLET ORAL at 18:29

## 2018-02-11 RX ADMIN — ASPIRIN 1 MG: 81 TABLET, COATED ORAL at 09:06

## 2018-02-12 LAB
ALBUMIN SERPL-MCNC: 2.7 G/DL (ref 3.4–5)
ANION GAP SERPL CALC-SCNC: 10 MMOL/L (ref 6–14)
BLOOD UREA NITROGEN: 51 MG/DL (ref 8–26)
CALCIUM: 9.5 MG/DL (ref 8.5–10.1)
CHLORIDE: 99 MMOL/L (ref 98–107)
CO2 SERPL-SCNC: 27 MMOL/L (ref 21–32)
CREAT SERPL-MCNC: 7 MG/DL (ref 0.7–1.3)
GFR SERPLBLD BASED ON 1.73 SQ M-ARVRAT: 9.5 ML/MIN
GLUCOSE SERPL-MCNC: 104 MG/DL (ref 70–99)
PHOSPHORUS: 4.7 MG/DL (ref 2.6–4.7)
POC GLUCOSE: 112 MG/DL (ref 70–99)
POC GLUCOSE: 145 MG/DL (ref 70–99)
POTASSIUM SERPL-SCNC: 4.9 MMOL/L (ref 3.5–5.1)
SODIUM: 136 MMOL/L (ref 136–145)

## 2018-02-12 RX ADMIN — INSULIN ASPART 1 UNITS: 100 INJECTION, SOLUTION INTRAVENOUS; SUBCUTANEOUS at 11:35

## 2018-02-12 RX ADMIN — OXYCODONE HYDROCHLORIDE AND ACETAMINOPHEN 1 TAB: 5; 325 TABLET ORAL at 09:36

## 2018-02-12 RX ADMIN — INSULIN ASPART 1 UNITS: 100 INJECTION, SOLUTION INTRAVENOUS; SUBCUTANEOUS at 08:00

## 2018-02-12 RX ADMIN — PANTOPRAZOLE SODIUM 1 MG: 40 TABLET, DELAYED RELEASE ORAL at 09:32

## 2018-02-12 RX ADMIN — SEVELAMER CARBONATE 1 MG: 800 TABLET, FILM COATED ORAL at 17:00

## 2018-02-12 RX ADMIN — FUROSEMIDE 1 MG: 80 TABLET ORAL at 16:00

## 2018-02-12 RX ADMIN — Medication 1 TAB: at 09:35

## 2018-02-12 RX ADMIN — SEVELAMER CARBONATE 1 MG: 800 TABLET, FILM COATED ORAL at 09:32

## 2018-02-12 RX ADMIN — SEVELAMER CARBONATE 1 MG: 800 TABLET, FILM COATED ORAL at 13:15

## 2018-02-12 RX ADMIN — MORPHINE SULFATE 1 MG: 4 INJECTION, SOLUTION INTRAMUSCULAR; INTRAVENOUS at 11:34

## 2018-02-12 RX ADMIN — OXYCODONE HYDROCHLORIDE AND ACETAMINOPHEN 1 TAB: 5; 325 TABLET ORAL at 00:49

## 2018-02-12 RX ADMIN — Medication 1 CAP: at 09:33

## 2018-02-12 RX ADMIN — INSULIN ASPART 1 UNITS: 100 INJECTION, SOLUTION INTRAVENOUS; SUBCUTANEOUS at 17:00

## 2018-02-12 RX ADMIN — OXYCODONE HYDROCHLORIDE AND ACETAMINOPHEN 1 TAB: 5; 325 TABLET ORAL at 14:21

## 2018-02-12 RX ADMIN — FUROSEMIDE 1 MG: 80 TABLET ORAL at 09:00

## 2018-02-12 RX ADMIN — MORPHINE SULFATE 1 MG: 4 INJECTION, SOLUTION INTRAMUSCULAR; INTRAVENOUS at 19:30

## 2018-02-12 RX ADMIN — CALCITRIOL 1 MCG: 0.25 CAPSULE, LIQUID FILLED ORAL at 09:32

## 2018-02-12 RX ADMIN — OXYCODONE HYDROCHLORIDE AND ACETAMINOPHEN 1 TAB: 5; 325 TABLET ORAL at 04:59

## 2018-02-12 RX ADMIN — CEFAZOLIN 1 GM: 330 INJECTION, POWDER, FOR SOLUTION INTRAMUSCULAR; INTRAVENOUS at 19:29

## 2018-02-12 RX ADMIN — POLYETHYLENE GLYCOL 3350 1 GM: 17 POWDER, FOR SOLUTION ORAL at 09:31

## 2018-02-12 RX ADMIN — Medication 1 MLS/HR: at 11:35

## 2018-02-12 RX ADMIN — PANTOPRAZOLE SODIUM 1 MG: 40 TABLET, DELAYED RELEASE ORAL at 16:30

## 2018-02-12 RX ADMIN — ASPIRIN 1 MG: 81 TABLET, COATED ORAL at 09:35

## 2018-02-12 RX ADMIN — METOPROLOL SUCCINATE 1 MG: 25 TABLET, EXTENDED RELEASE ORAL at 09:34

## 2018-02-14 ENCOUNTER — HOSPITAL ENCOUNTER (EMERGENCY)
Dept: HOSPITAL 61 - ER | Age: 68
Discharge: HOME | End: 2018-02-14
Payer: COMMERCIAL

## 2018-02-14 DIAGNOSIS — Z95.1: ICD-10-CM

## 2018-02-14 DIAGNOSIS — J44.9: ICD-10-CM

## 2018-02-14 DIAGNOSIS — M54.12: Primary | ICD-10-CM

## 2018-02-14 DIAGNOSIS — N18.6: ICD-10-CM

## 2018-02-14 DIAGNOSIS — E11.22: ICD-10-CM

## 2018-02-14 DIAGNOSIS — I50.9: ICD-10-CM

## 2018-02-14 DIAGNOSIS — Z99.2: ICD-10-CM

## 2018-02-14 DIAGNOSIS — I13.2: ICD-10-CM

## 2018-02-14 DIAGNOSIS — I25.10: ICD-10-CM

## 2018-02-14 LAB
ADD MAN DIFF?: NO
ANION GAP SERPL CALC-SCNC: 11 MMOL/L (ref 6–14)
ANISOCYTOSIS: (no result)
BASO #: 0 X10^3/UL (ref 0–0.2)
BASO %: 0 % (ref 0–3)
BLOOD UREA NITROGEN: 49 MG/DL (ref 8–26)
CALCIUM: 9.4 MG/DL (ref 8.5–10.1)
CHLORIDE: 98 MMOL/L (ref 98–107)
CK SERPL-CCNC: 43 U/L (ref 39–308)
CKMB INDEX: (no result) % (ref 0–4)
CKMB MASS: 0.6 NG/ML (ref 0–3.6)
CO2 SERPL-SCNC: 28 MMOL/L (ref 21–32)
CREAT SERPL-MCNC: 7.4 MG/DL (ref 0.7–1.3)
EOS #: 0.3 X10^3/UL (ref 0–0.7)
EOS %: 5 % (ref 0–3)
GFR SERPLBLD BASED ON 1.73 SQ M-ARVRAT: 8.9 ML/MIN
GLUCOSE SERPL-MCNC: 96 MG/DL (ref 70–99)
HCG SERPL-ACNC: 7.4 X10^3/UL (ref 4–11)
HEMATOCRIT: 24.6 % (ref 39–53)
HEMOGLOBIN: 8.1 G/DL (ref 13–17.5)
LYMPH #: 1.3 X10^3/UL (ref 1–4.8)
LYMPH %: 18 % (ref 24–48)
MAGNESIUM: 2.5 MG/DL (ref 1.8–2.4)
MEAN CORPUSCULAR HEMOGLOBIN: 33 PG (ref 25–35)
MEAN CORPUSCULAR HGB CONC: 33 G/DL (ref 31–37)
MEAN CORPUSCULAR VOLUME: 99 FL (ref 79–100)
MONO #: 0.8 X10^3/UL (ref 0–1.1)
MONO %: 11 % (ref 0–9)
NEUT #: 4.9 X10^3UL (ref 1.8–7.7)
NEUT %: 66 % (ref 31–73)
OVALOCYTES: (no result)
PLATELET COUNT: 202 X10^3/UL (ref 140–400)
PLT ESTIMATE: ADEQUATE
POIKILOCYTOSIS: SLIGHT
POTASSIUM SERPL-SCNC: 5.2 MMOL/L (ref 3.5–5.1)
RED BLOOD COUNT: 2.49 X10^6/UL (ref 4.3–5.7)
RED CELL DISTRIBUTION WIDTH: 21.2 % (ref 11.5–14.5)
SCHISTOCYTES: (no result)
SODIUM: 137 MMOL/L (ref 136–145)
TROPONINI: 0.04 NG/ML (ref 0–0.06)

## 2018-02-14 PROCEDURE — 85025 COMPLETE CBC W/AUTO DIFF WBC: CPT

## 2018-02-14 PROCEDURE — 82553 CREATINE MB FRACTION: CPT

## 2018-02-14 PROCEDURE — 96374 THER/PROPH/DIAG INJ IV PUSH: CPT

## 2018-02-14 PROCEDURE — 99284 EMERGENCY DEPT VISIT MOD MDM: CPT

## 2018-02-14 PROCEDURE — 84484 ASSAY OF TROPONIN QUANT: CPT

## 2018-02-14 PROCEDURE — 80048 BASIC METABOLIC PNL TOTAL CA: CPT

## 2018-02-14 PROCEDURE — 96375 TX/PRO/DX INJ NEW DRUG ADDON: CPT

## 2018-02-14 PROCEDURE — 96372 THER/PROPH/DIAG INJ SC/IM: CPT

## 2018-02-14 PROCEDURE — 83735 ASSAY OF MAGNESIUM: CPT

## 2018-02-14 PROCEDURE — 36415 COLL VENOUS BLD VENIPUNCTURE: CPT

## 2018-02-14 RX ADMIN — HYDROMORPHONE HYDROCHLORIDE 1 MG: 2 INJECTION INTRAMUSCULAR; INTRAVENOUS; SUBCUTANEOUS at 21:15

## 2018-02-14 RX ADMIN — ONDANSETRON 1 MG: 2 INJECTION INTRAMUSCULAR; INTRAVENOUS at 21:16

## 2018-02-14 RX ADMIN — HYDROMORPHONE HYDROCHLORIDE 1 MG: 2 INJECTION INTRAMUSCULAR; INTRAVENOUS; SUBCUTANEOUS at 22:28

## 2018-02-16 ENCOUNTER — HOSPITAL ENCOUNTER (INPATIENT)
Dept: HOSPITAL 61 - ER | Age: 68
LOS: 3 days | Discharge: HOME | DRG: 551 | End: 2018-02-19
Attending: FAMILY MEDICINE | Admitting: FAMILY MEDICINE
Payer: COMMERCIAL

## 2018-02-16 DIAGNOSIS — E11.22: ICD-10-CM

## 2018-02-16 DIAGNOSIS — I25.10: ICD-10-CM

## 2018-02-16 DIAGNOSIS — I50.9: ICD-10-CM

## 2018-02-16 DIAGNOSIS — I42.0: ICD-10-CM

## 2018-02-16 DIAGNOSIS — R78.81: ICD-10-CM

## 2018-02-16 DIAGNOSIS — N18.6: ICD-10-CM

## 2018-02-16 DIAGNOSIS — J44.9: ICD-10-CM

## 2018-02-16 DIAGNOSIS — E88.2: ICD-10-CM

## 2018-02-16 DIAGNOSIS — Z79.4: ICD-10-CM

## 2018-02-16 DIAGNOSIS — Z99.2: ICD-10-CM

## 2018-02-16 DIAGNOSIS — M50.123: ICD-10-CM

## 2018-02-16 DIAGNOSIS — B95.61: ICD-10-CM

## 2018-02-16 DIAGNOSIS — E21.3: ICD-10-CM

## 2018-02-16 DIAGNOSIS — E78.5: ICD-10-CM

## 2018-02-16 DIAGNOSIS — E11.21: ICD-10-CM

## 2018-02-16 DIAGNOSIS — M46.22: ICD-10-CM

## 2018-02-16 DIAGNOSIS — L03.114: ICD-10-CM

## 2018-02-16 DIAGNOSIS — B96.89: ICD-10-CM

## 2018-02-16 DIAGNOSIS — D63.1: ICD-10-CM

## 2018-02-16 DIAGNOSIS — K21.9: ICD-10-CM

## 2018-02-16 DIAGNOSIS — I13.2: ICD-10-CM

## 2018-02-16 DIAGNOSIS — I89.0: ICD-10-CM

## 2018-02-16 DIAGNOSIS — Z95.1: ICD-10-CM

## 2018-02-16 DIAGNOSIS — E87.5: ICD-10-CM

## 2018-02-16 DIAGNOSIS — N25.81: ICD-10-CM

## 2018-02-16 DIAGNOSIS — I87.2: ICD-10-CM

## 2018-02-16 DIAGNOSIS — E11.69: ICD-10-CM

## 2018-02-16 DIAGNOSIS — M48.02: Primary | ICD-10-CM

## 2018-02-16 LAB
ADD MAN DIFF?: NO
ALBUMIN SERPL-MCNC: 3 G/DL (ref 3.4–5)
ALBUMIN/GLOB SERPL: 0.5 {RATIO} (ref 1–1.7)
ALP SERPL-CCNC: 106 U/L (ref 46–116)
ALT (SGPT): < 6 U/L (ref 16–63)
ANION GAP SERPL CALC-SCNC: 15 MMOL/L (ref 6–14)
ANISOCYTOSIS: SLIGHT
AST SERPL-CCNC: 13 U/L (ref 15–37)
BASO #: 0 X10^3/UL (ref 0–0.2)
BASO %: 0 % (ref 0–3)
BLOOD UREA NITROGEN: 61 MG/DL (ref 8–26)
BUN/CREAT SERPL: 7 (ref 6–20)
CALCIUM: 9.4 MG/DL (ref 8.5–10.1)
CHLORIDE: 98 MMOL/L (ref 98–107)
CO2 SERPL-SCNC: 22 MMOL/L (ref 21–32)
CREAT SERPL-MCNC: 9.3 MG/DL (ref 0.7–1.3)
EOS #: 0.2 X10^3/UL (ref 0–0.7)
EOS %: 3 % (ref 0–3)
GFR SERPLBLD BASED ON 1.73 SQ M-ARVRAT: 6.9 ML/MIN
GLOBULIN SER-MCNC: 5.9 G/DL (ref 2.2–3.8)
GLUCOSE SERPL-MCNC: 89 MG/DL (ref 70–99)
HCG SERPL-ACNC: 8.4 X10^3/UL (ref 4–11)
HEMATOCRIT: 23 % (ref 39–53)
HEMOGLOBIN: 7.6 G/DL (ref 13–17.5)
HYPOCHROMIA: SLIGHT
LYMPH #: 1.2 X10^3/UL (ref 1–4.8)
LYMPH %: 14 % (ref 24–48)
MEAN CORPUSCULAR HEMOGLOBIN: 33 PG (ref 25–35)
MEAN CORPUSCULAR HGB CONC: 33 G/DL (ref 31–37)
MEAN CORPUSCULAR VOLUME: 99 FL (ref 79–100)
MONO #: 0.7 X10^3/UL (ref 0–1.1)
MONO %: 8 % (ref 0–9)
NEUT #: 6.3 X10^3UL (ref 1.8–7.7)
NEUT %: 75 % (ref 31–73)
NT-PRO BNP: (no result) PG/ML (ref 0–124)
PLATELET COUNT: 198 X10^3/UL (ref 140–400)
PLT ESTIMATE: ADEQUATE
POC GLUCOSE: 100 MG/DL (ref 70–99)
POC GLUCOSE: 108 MG/DL (ref 70–99)
POTASSIUM SERPL-SCNC: 5.8 MMOL/L (ref 3.5–5.1)
RED BLOOD COUNT: 2.31 X10^6/UL (ref 4.3–5.7)
RED CELL DISTRIBUTION WIDTH: 20.9 % (ref 11.5–14.5)
SODIUM: 135 MMOL/L (ref 136–145)
TOTAL BILIRUBIN: 0.3 MG/DL (ref 0.2–1)
TOTAL PROTEIN: 8.9 G/DL (ref 6.4–8.2)
TROPONINI: 0.02 NG/ML (ref 0–0.06)

## 2018-02-16 PROCEDURE — 36415 COLL VENOUS BLD VENIPUNCTURE: CPT

## 2018-02-16 PROCEDURE — 83880 ASSAY OF NATRIURETIC PEPTIDE: CPT

## 2018-02-16 PROCEDURE — 5A1D70Z PERFORMANCE OF URINARY FILTRATION, INTERMITTENT, LESS THAN 6 HOURS PER DAY: ICD-10-PCS

## 2018-02-16 PROCEDURE — 96374 THER/PROPH/DIAG INJ IV PUSH: CPT

## 2018-02-16 PROCEDURE — 84484 ASSAY OF TROPONIN QUANT: CPT

## 2018-02-16 PROCEDURE — 85025 COMPLETE CBC W/AUTO DIFF WBC: CPT

## 2018-02-16 PROCEDURE — 80053 COMPREHEN METABOLIC PANEL: CPT

## 2018-02-16 PROCEDURE — 76881 US COMPL JOINT R-T W/IMG: CPT

## 2018-02-16 PROCEDURE — 80048 BASIC METABOLIC PNL TOTAL CA: CPT

## 2018-02-16 PROCEDURE — 82962 GLUCOSE BLOOD TEST: CPT

## 2018-02-16 PROCEDURE — 72141 MRI NECK SPINE W/O DYE: CPT

## 2018-02-16 PROCEDURE — 99285 EMERGENCY DEPT VISIT HI MDM: CPT

## 2018-02-16 PROCEDURE — 82553 CREATINE MB FRACTION: CPT

## 2018-02-16 PROCEDURE — 93005 ELECTROCARDIOGRAM TRACING: CPT

## 2018-02-16 PROCEDURE — 83735 ASSAY OF MAGNESIUM: CPT

## 2018-02-16 RX ADMIN — HYDROMORPHONE HYDROCHLORIDE 1 MG: 2 INJECTION INTRAMUSCULAR; INTRAVENOUS; SUBCUTANEOUS at 11:14

## 2018-02-16 RX ADMIN — FUROSEMIDE 1 MG: 80 TABLET ORAL at 21:01

## 2018-02-16 RX ADMIN — HYDROMORPHONE HYDROCHLORIDE 1 MG: 2 INJECTION INTRAMUSCULAR; INTRAVENOUS; SUBCUTANEOUS at 16:28

## 2018-02-16 RX ADMIN — OXYCODONE HYDROCHLORIDE AND ACETAMINOPHEN 1 TAB: 10; 325 TABLET ORAL at 21:01

## 2018-02-16 RX ADMIN — SIMVASTATIN 1 MG: 10 TABLET, FILM COATED ORAL at 21:01

## 2018-02-16 RX ADMIN — HYDROMORPHONE HYDROCHLORIDE 1 MG: 2 INJECTION INTRAMUSCULAR; INTRAVENOUS; SUBCUTANEOUS at 21:02

## 2018-02-16 RX ADMIN — CEFAZOLIN 1 GM: 330 INJECTION, POWDER, FOR SOLUTION INTRAMUSCULAR; INTRAVENOUS at 21:01

## 2018-02-17 LAB
ADD MAN DIFF?: NO
ANION GAP SERPL CALC-SCNC: 8 MMOL/L (ref 6–14)
BASO #: 0 X10^3/UL (ref 0–0.2)
BASO %: 1 % (ref 0–3)
BLOOD UREA NITROGEN: 35 MG/DL (ref 8–26)
CALCIUM: 8.6 MG/DL (ref 8.5–10.1)
CHLORIDE: 101 MMOL/L (ref 98–107)
CO2 SERPL-SCNC: 30 MMOL/L (ref 21–32)
CREAT SERPL-MCNC: 6.3 MG/DL (ref 0.7–1.3)
EOS #: 0.3 X10^3/UL (ref 0–0.7)
EOS %: 5 % (ref 0–3)
GFR SERPLBLD BASED ON 1.73 SQ M-ARVRAT: 10.7 ML/MIN
GLUCOSE SERPL-MCNC: 88 MG/DL (ref 70–99)
HCG SERPL-ACNC: 5.2 X10^3/UL (ref 4–11)
HEMATOCRIT: 21.6 % (ref 39–53)
HEMOGLOBIN: 7.1 G/DL (ref 13–17.5)
LYMPH #: 0.9 X10^3/UL (ref 1–4.8)
LYMPH %: 18 % (ref 24–48)
MEAN CORPUSCULAR HEMOGLOBIN: 33 PG (ref 25–35)
MEAN CORPUSCULAR HGB CONC: 33 G/DL (ref 31–37)
MEAN CORPUSCULAR VOLUME: 100 FL (ref 79–100)
MONO #: 0.6 X10^3/UL (ref 0–1.1)
MONO %: 12 % (ref 0–9)
NEUT #: 3.3 X10^3UL (ref 1.8–7.7)
NEUT %: 64 % (ref 31–73)
PLATELET COUNT: 171 X10^3/UL (ref 140–400)
POC GLUCOSE: 137 MG/DL (ref 70–99)
POC GLUCOSE: 205 MG/DL (ref 70–99)
POC GLUCOSE: 95 MG/DL (ref 70–99)
POTASSIUM SERPL-SCNC: 4.7 MMOL/L (ref 3.5–5.1)
RED BLOOD COUNT: 2.17 X10^6/UL (ref 4.3–5.7)
RED CELL DISTRIBUTION WIDTH: 21 % (ref 11.5–14.5)
SODIUM: 139 MMOL/L (ref 136–145)

## 2018-02-17 RX ADMIN — Medication 1 TAB: at 08:51

## 2018-02-17 RX ADMIN — PANTOPRAZOLE SODIUM 1 MG: 40 TABLET, DELAYED RELEASE ORAL at 08:51

## 2018-02-17 RX ADMIN — FUROSEMIDE 1 MG: 80 TABLET ORAL at 16:19

## 2018-02-17 RX ADMIN — HYDROMORPHONE HYDROCHLORIDE 1 MG: 2 INJECTION INTRAMUSCULAR; INTRAVENOUS; SUBCUTANEOUS at 14:18

## 2018-02-17 RX ADMIN — POLYETHYLENE GLYCOL 3350 1 GM: 17 POWDER, FOR SOLUTION ORAL at 08:51

## 2018-02-17 RX ADMIN — OXYCODONE HYDROCHLORIDE AND ACETAMINOPHEN 1 TAB: 10; 325 TABLET ORAL at 22:55

## 2018-02-17 RX ADMIN — FUROSEMIDE 1 MG: 80 TABLET ORAL at 08:51

## 2018-02-17 RX ADMIN — SEVELAMER CARBONATE 1 MG: 800 TABLET, FILM COATED ORAL at 11:37

## 2018-02-17 RX ADMIN — SEVELAMER CARBONATE 1 MG: 800 TABLET, FILM COATED ORAL at 16:19

## 2018-02-17 RX ADMIN — OXYCODONE HYDROCHLORIDE AND ACETAMINOPHEN 1 TAB: 5; 325 TABLET ORAL at 14:18

## 2018-02-17 RX ADMIN — OXYCODONE HYDROCHLORIDE AND ACETAMINOPHEN 1 TAB: 10; 325 TABLET ORAL at 17:00

## 2018-02-17 RX ADMIN — CALCITRIOL 1 MCG: 0.25 CAPSULE, LIQUID FILLED ORAL at 08:51

## 2018-02-17 RX ADMIN — HYDROMORPHONE HYDROCHLORIDE 1 MG: 2 INJECTION INTRAMUSCULAR; INTRAVENOUS; SUBCUTANEOUS at 17:01

## 2018-02-17 RX ADMIN — METOPROLOL SUCCINATE 1 MG: 25 TABLET, EXTENDED RELEASE ORAL at 08:51

## 2018-02-17 RX ADMIN — PANTOPRAZOLE SODIUM 1 MG: 40 TABLET, DELAYED RELEASE ORAL at 16:19

## 2018-02-17 RX ADMIN — ASPIRIN 1 MG: 81 TABLET, COATED ORAL at 08:51

## 2018-02-17 RX ADMIN — SIMVASTATIN 1 MG: 10 TABLET, FILM COATED ORAL at 20:46

## 2018-02-17 RX ADMIN — HYDROMORPHONE HYDROCHLORIDE 1 MG: 2 INJECTION INTRAMUSCULAR; INTRAVENOUS; SUBCUTANEOUS at 20:48

## 2018-02-17 RX ADMIN — OXYCODONE HYDROCHLORIDE AND ACETAMINOPHEN 1 TAB: 10; 325 TABLET ORAL at 09:02

## 2018-02-17 RX ADMIN — HYDROMORPHONE HYDROCHLORIDE 1 MG: 2 INJECTION INTRAMUSCULAR; INTRAVENOUS; SUBCUTANEOUS at 09:01

## 2018-02-17 RX ADMIN — CEFAZOLIN 1 GM: 330 INJECTION, POWDER, FOR SOLUTION INTRAMUSCULAR; INTRAVENOUS at 18:33

## 2018-02-17 RX ADMIN — SEVELAMER CARBONATE 1 MG: 800 TABLET, FILM COATED ORAL at 08:50

## 2018-02-18 LAB
POC GLUCOSE: 114 MG/DL (ref 70–99)
POC GLUCOSE: 127 MG/DL (ref 70–99)
POC GLUCOSE: 89 MG/DL (ref 70–99)
POC GLUCOSE: 98 MG/DL (ref 70–99)
POC GLUCOSE: 99 MG/DL (ref 70–99)

## 2018-02-18 RX ADMIN — OXYCODONE HYDROCHLORIDE AND ACETAMINOPHEN 1 TAB: 10; 325 TABLET ORAL at 22:19

## 2018-02-18 RX ADMIN — HYDROMORPHONE HYDROCHLORIDE 1 MG: 2 INJECTION INTRAMUSCULAR; INTRAVENOUS; SUBCUTANEOUS at 15:50

## 2018-02-18 RX ADMIN — FUROSEMIDE 1 MG: 80 TABLET ORAL at 15:49

## 2018-02-18 RX ADMIN — CEFAZOLIN 1 GM: 330 INJECTION, POWDER, FOR SOLUTION INTRAMUSCULAR; INTRAVENOUS at 18:30

## 2018-02-18 RX ADMIN — OXYCODONE HYDROCHLORIDE AND ACETAMINOPHEN 1 TAB: 10; 325 TABLET ORAL at 15:49

## 2018-02-18 RX ADMIN — OXYCODONE HYDROCHLORIDE AND ACETAMINOPHEN 1 TAB: 5; 325 TABLET ORAL at 08:13

## 2018-02-18 RX ADMIN — ASPIRIN 1 MG: 81 TABLET, COATED ORAL at 08:17

## 2018-02-18 RX ADMIN — METOPROLOL SUCCINATE 1 MG: 25 TABLET, EXTENDED RELEASE ORAL at 08:16

## 2018-02-18 RX ADMIN — PANTOPRAZOLE SODIUM 1 MG: 40 TABLET, DELAYED RELEASE ORAL at 16:39

## 2018-02-18 RX ADMIN — FUROSEMIDE 1 MG: 80 TABLET ORAL at 08:16

## 2018-02-18 RX ADMIN — SIMVASTATIN 1 MG: 10 TABLET, FILM COATED ORAL at 20:19

## 2018-02-18 RX ADMIN — SEVELAMER CARBONATE 1 MG: 800 TABLET, FILM COATED ORAL at 12:04

## 2018-02-18 RX ADMIN — CALCITRIOL 1 MCG: 0.25 CAPSULE, LIQUID FILLED ORAL at 08:16

## 2018-02-18 RX ADMIN — SEVELAMER CARBONATE 1 MG: 800 TABLET, FILM COATED ORAL at 08:17

## 2018-02-18 RX ADMIN — Medication 1 TAB: at 08:16

## 2018-02-18 RX ADMIN — SEVELAMER CARBONATE 1 MG: 800 TABLET, FILM COATED ORAL at 16:39

## 2018-02-18 RX ADMIN — PANTOPRAZOLE SODIUM 1 MG: 40 TABLET, DELAYED RELEASE ORAL at 08:16

## 2018-02-18 RX ADMIN — POLYETHYLENE GLYCOL 3350 1 GM: 17 POWDER, FOR SOLUTION ORAL at 08:13

## 2018-02-18 RX ADMIN — HYDROMORPHONE HYDROCHLORIDE 1 MG: 2 INJECTION INTRAMUSCULAR; INTRAVENOUS; SUBCUTANEOUS at 08:17

## 2018-02-18 RX ADMIN — HYDROMORPHONE HYDROCHLORIDE 1 MG: 2 INJECTION INTRAMUSCULAR; INTRAVENOUS; SUBCUTANEOUS at 22:19

## 2018-02-19 LAB
ADD MAN DIFF?: NO
ANION GAP SERPL CALC-SCNC: 10 MMOL/L (ref 6–14)
BASO #: 0 X10^3/UL (ref 0–0.2)
BASO %: 0 % (ref 0–3)
BLOOD UREA NITROGEN: 53 MG/DL (ref 8–26)
CALCIUM: 9.2 MG/DL (ref 8.5–10.1)
CHLORIDE: 99 MMOL/L (ref 98–107)
CO2 SERPL-SCNC: 27 MMOL/L (ref 21–32)
CREAT SERPL-MCNC: 8.7 MG/DL (ref 0.7–1.3)
EOS #: 0.3 X10^3/UL (ref 0–0.7)
EOS %: 4 % (ref 0–3)
GFR SERPLBLD BASED ON 1.73 SQ M-ARVRAT: 7.4 ML/MIN
GLUCOSE SERPL-MCNC: 85 MG/DL (ref 70–99)
HCG SERPL-ACNC: 6.9 X10^3/UL (ref 4–11)
HEMATOCRIT: 23.3 % (ref 39–53)
HEMOGLOBIN: 7.9 G/DL (ref 13–17.5)
LYMPH #: 1 X10^3/UL (ref 1–4.8)
LYMPH %: 14 % (ref 24–48)
MEAN CORPUSCULAR HEMOGLOBIN: 33 PG (ref 25–35)
MEAN CORPUSCULAR HGB CONC: 34 G/DL (ref 31–37)
MEAN CORPUSCULAR VOLUME: 99 FL (ref 79–100)
MONO #: 0.8 X10^3/UL (ref 0–1.1)
MONO %: 11 % (ref 0–9)
NEUT #: 4.8 X10^3UL (ref 1.8–7.7)
NEUT %: 70 % (ref 31–73)
PLATELET COUNT: 201 X10^3/UL (ref 140–400)
POC GLUCOSE: 89 MG/DL (ref 70–99)
POTASSIUM SERPL-SCNC: 4.8 MMOL/L (ref 3.5–5.1)
RED BLOOD COUNT: 2.36 X10^6/UL (ref 4.3–5.7)
RED CELL DISTRIBUTION WIDTH: 21.2 % (ref 11.5–14.5)
SODIUM: 136 MMOL/L (ref 136–145)

## 2018-02-19 PROCEDURE — 5A1D70Z PERFORMANCE OF URINARY FILTRATION, INTERMITTENT, LESS THAN 6 HOURS PER DAY: ICD-10-PCS

## 2018-02-19 RX ADMIN — ASPIRIN 1 MG: 81 TABLET, COATED ORAL at 12:43

## 2018-02-19 RX ADMIN — SEVELAMER CARBONATE 1 MG: 800 TABLET, FILM COATED ORAL at 12:45

## 2018-02-19 RX ADMIN — OXYCODONE HYDROCHLORIDE AND ACETAMINOPHEN 1 TAB: 5; 325 TABLET ORAL at 03:57

## 2018-02-19 RX ADMIN — OXYCODONE HYDROCHLORIDE AND ACETAMINOPHEN 1 TAB: 10; 325 TABLET ORAL at 09:00

## 2018-02-19 RX ADMIN — HYDROMORPHONE HYDROCHLORIDE 1 MG: 2 INJECTION INTRAMUSCULAR; INTRAVENOUS; SUBCUTANEOUS at 03:57

## 2018-02-19 RX ADMIN — Medication 1 TAB: at 12:45

## 2018-02-19 RX ADMIN — METOPROLOL SUCCINATE 1 MG: 25 TABLET, EXTENDED RELEASE ORAL at 12:44

## 2018-02-19 RX ADMIN — HYDROMORPHONE HYDROCHLORIDE 1 MG: 2 INJECTION INTRAMUSCULAR; INTRAVENOUS; SUBCUTANEOUS at 08:59

## 2018-02-19 RX ADMIN — SEVELAMER CARBONATE 1 MG: 800 TABLET, FILM COATED ORAL at 08:00

## 2018-02-19 RX ADMIN — POLYETHYLENE GLYCOL 3350 1 GM: 17 POWDER, FOR SOLUTION ORAL at 12:43

## 2018-02-19 RX ADMIN — FUROSEMIDE 1 MG: 80 TABLET ORAL at 12:44

## 2018-02-19 RX ADMIN — OXYCODONE HYDROCHLORIDE AND ACETAMINOPHEN 1 TAB: 5; 325 TABLET ORAL at 12:46

## 2018-02-19 RX ADMIN — PANTOPRAZOLE SODIUM 1 MG: 40 TABLET, DELAYED RELEASE ORAL at 12:45

## 2018-02-19 RX ADMIN — CALCITRIOL 1 MCG: 0.25 CAPSULE, LIQUID FILLED ORAL at 12:45

## 2018-02-28 ENCOUNTER — HOSPITAL ENCOUNTER (EMERGENCY)
Dept: HOSPITAL 61 - ER | Age: 68
Discharge: HOME | End: 2018-02-28
Payer: COMMERCIAL

## 2018-02-28 DIAGNOSIS — Z95.5: ICD-10-CM

## 2018-02-28 DIAGNOSIS — I13.2: ICD-10-CM

## 2018-02-28 DIAGNOSIS — J44.9: ICD-10-CM

## 2018-02-28 DIAGNOSIS — I50.9: ICD-10-CM

## 2018-02-28 DIAGNOSIS — I25.10: ICD-10-CM

## 2018-02-28 DIAGNOSIS — N18.9: ICD-10-CM

## 2018-02-28 DIAGNOSIS — I13.0: ICD-10-CM

## 2018-02-28 DIAGNOSIS — M54.2: Primary | ICD-10-CM

## 2018-02-28 DIAGNOSIS — M86.8X8: Primary | ICD-10-CM

## 2018-02-28 DIAGNOSIS — E11.22: ICD-10-CM

## 2018-02-28 DIAGNOSIS — G89.29: ICD-10-CM

## 2018-02-28 DIAGNOSIS — N18.6: ICD-10-CM

## 2018-02-28 DIAGNOSIS — Z95.1: ICD-10-CM

## 2018-02-28 PROCEDURE — 93005 ELECTROCARDIOGRAM TRACING: CPT

## 2018-02-28 PROCEDURE — 99283 EMERGENCY DEPT VISIT LOW MDM: CPT

## 2018-02-28 PROCEDURE — 96372 THER/PROPH/DIAG INJ SC/IM: CPT

## 2018-02-28 RX ADMIN — CYCLOBENZAPRINE HYDROCHLORIDE 1 MG: 10 TABLET, FILM COATED ORAL at 04:45

## 2018-02-28 RX ADMIN — MORPHINE SULFATE 1 MG: 4 INJECTION, SOLUTION INTRAMUSCULAR; INTRAVENOUS at 04:45

## 2018-02-28 RX ADMIN — MORPHINE SULFATE 1 MG: 4 INJECTION, SOLUTION INTRAMUSCULAR; INTRAVENOUS at 05:00

## 2018-05-17 ENCOUNTER — HOSPITAL ENCOUNTER (EMERGENCY)
Dept: HOSPITAL 61 - ER | Age: 68
Discharge: HOME | End: 2018-05-17
Payer: COMMERCIAL

## 2018-05-17 DIAGNOSIS — I86.1: ICD-10-CM

## 2018-05-17 DIAGNOSIS — N45.1: Primary | ICD-10-CM

## 2018-05-17 PROCEDURE — 76870 US EXAM SCROTUM: CPT

## 2018-05-17 PROCEDURE — 96372 THER/PROPH/DIAG INJ SC/IM: CPT

## 2018-05-17 PROCEDURE — 99284 EMERGENCY DEPT VISIT MOD MDM: CPT

## 2018-05-17 RX ADMIN — AZITHROMYCIN 1 MG: 250 TABLET, FILM COATED ORAL at 18:09

## 2018-05-17 RX ADMIN — OXYCODONE HYDROCHLORIDE AND ACETAMINOPHEN 1 TAB: 10; 325 TABLET ORAL at 18:09

## 2018-05-17 RX ADMIN — CEFTRIAXONE 1 MG: 250 INJECTION, POWDER, FOR SOLUTION INTRAMUSCULAR; INTRAVENOUS at 18:09

## 2018-05-23 ENCOUNTER — HOSPITAL ENCOUNTER (EMERGENCY)
Dept: HOSPITAL 61 - ER | Age: 68
Discharge: HOME | End: 2018-05-23
Payer: COMMERCIAL

## 2018-05-23 DIAGNOSIS — E11.9: ICD-10-CM

## 2018-05-23 DIAGNOSIS — R10.32: ICD-10-CM

## 2018-05-23 DIAGNOSIS — I25.810: ICD-10-CM

## 2018-05-23 DIAGNOSIS — N50.812: Primary | ICD-10-CM

## 2018-05-23 DIAGNOSIS — I11.0: ICD-10-CM

## 2018-05-23 DIAGNOSIS — I50.9: ICD-10-CM

## 2018-05-23 DIAGNOSIS — J44.9: ICD-10-CM

## 2018-05-23 LAB
ADD MAN DIFF?: NO
ALBUMIN SERPL-MCNC: 3.6 G/DL (ref 3.4–5)
ALBUMIN/GLOB SERPL: 0.8 {RATIO} (ref 1–1.7)
ALP SERPL-CCNC: 93 U/L (ref 46–116)
ALT (SGPT): 16 U/L (ref 16–63)
ANION GAP SERPL CALC-SCNC: 8 MMOL/L (ref 6–14)
AST SERPL-CCNC: 14 U/L (ref 15–37)
BACTERIA,URINE: 0 /HPF
BASO #: 0 X10^3/UL (ref 0–0.2)
BASO %: 1 % (ref 0–3)
BILIRUBIN,URINE: NEGATIVE
BLOOD UREA NITROGEN: 33 MG/DL (ref 8–26)
BUN/CREAT SERPL: 6 (ref 6–20)
CALCIUM: 8.7 MG/DL (ref 8.5–10.1)
CHLORIDE: 104 MMOL/L (ref 98–107)
CLARITY,URINE: CLEAR
CO2 SERPL-SCNC: 29 MMOL/L (ref 21–32)
COLOR,URINE: YELLOW
CREAT SERPL-MCNC: 5.2 MG/DL (ref 0.7–1.3)
EOS #: 0.1 X10^3/UL (ref 0–0.7)
EOS %: 2 % (ref 0–3)
GFR SERPLBLD BASED ON 1.73 SQ M-ARVRAT: 13.4 ML/MIN
GLOBULIN SER-MCNC: 4.4 G/DL (ref 2.2–3.8)
GLUCOSE SERPL-MCNC: 101 MG/DL (ref 70–99)
GLUCOSE,URINE: NEGATIVE MG/DL
HCG SERPL-ACNC: 5.7 X10^3/UL (ref 4–11)
HEMATOCRIT: 28.9 % (ref 39–53)
HEMOGLOBIN: 9.9 G/DL (ref 13–17.5)
LYMPH #: 1 X10^3/UL (ref 1–4.8)
LYMPH %: 18 % (ref 24–48)
MEAN CORPUSCULAR HEMOGLOBIN: 35 PG (ref 25–35)
MEAN CORPUSCULAR HGB CONC: 34 G/DL (ref 31–37)
MEAN CORPUSCULAR VOLUME: 101 FL (ref 79–100)
MONO #: 0.6 X10^3/UL (ref 0–1.1)
MONO %: 11 % (ref 0–9)
NEUT #: 3.9 X10^3UL (ref 1.8–7.7)
NEUT %: 67 % (ref 31–73)
NITRITE,URINE: NEGATIVE
PH,URINE: 7.5
PLATELET COUNT: 187 X10^3/UL (ref 140–400)
POTASSIUM SERPL-SCNC: 3.7 MMOL/L (ref 3.5–5.1)
PROTEIN,URINE: 100 MG/DL
RBC,URINE: 0 /HPF (ref 0–2)
RED BLOOD COUNT: 2.87 X10^6/UL (ref 4.3–5.7)
RED CELL DISTRIBUTION WIDTH: 18.3 % (ref 11.5–14.5)
SODIUM: 141 MMOL/L (ref 136–145)
SPECIFIC GRAVITY,URINE: 1.01
TOTAL BILIRUBIN: 0.6 MG/DL (ref 0.2–1)
TOTAL PROTEIN: 8 G/DL (ref 6.4–8.2)
UROBILINOGEN,URINE: 1 MG/DL
WBC,URINE: 0 /HPF (ref 0–4)

## 2018-05-23 PROCEDURE — 85025 COMPLETE CBC W/AUTO DIFF WBC: CPT

## 2018-05-23 PROCEDURE — 74176 CT ABD & PELVIS W/O CONTRAST: CPT

## 2018-05-23 PROCEDURE — 81001 URINALYSIS AUTO W/SCOPE: CPT

## 2018-05-23 PROCEDURE — 80053 COMPREHEN METABOLIC PANEL: CPT

## 2018-05-23 PROCEDURE — 36415 COLL VENOUS BLD VENIPUNCTURE: CPT

## 2018-05-23 PROCEDURE — 99285 EMERGENCY DEPT VISIT HI MDM: CPT

## 2019-12-05 ENCOUNTER — HOSPITAL ENCOUNTER (OUTPATIENT)
Dept: HOSPITAL 61 - ENDOS | Age: 69
End: 2019-12-05
Attending: INTERNAL MEDICINE
Payer: COMMERCIAL

## 2019-12-05 VITALS — SYSTOLIC BLOOD PRESSURE: 149 MMHG | DIASTOLIC BLOOD PRESSURE: 66 MMHG

## 2019-12-05 DIAGNOSIS — K57.30: ICD-10-CM

## 2019-12-05 DIAGNOSIS — F15.90: ICD-10-CM

## 2019-12-05 DIAGNOSIS — K64.0: ICD-10-CM

## 2019-12-05 DIAGNOSIS — Z85.118: ICD-10-CM

## 2019-12-05 DIAGNOSIS — Z72.89: ICD-10-CM

## 2019-12-05 DIAGNOSIS — F41.9: ICD-10-CM

## 2019-12-05 DIAGNOSIS — Z12.11: Primary | ICD-10-CM

## 2019-12-05 DIAGNOSIS — Z87.891: ICD-10-CM

## 2019-12-05 DIAGNOSIS — Z79.82: ICD-10-CM

## 2019-12-05 PROCEDURE — G0105 COLORECTAL SCRN; HI RISK IND: HCPCS

## 2019-12-05 PROCEDURE — 45378 DIAGNOSTIC COLONOSCOPY: CPT

## 2020-04-02 ENCOUNTER — HOSPITAL ENCOUNTER (EMERGENCY)
Dept: HOSPITAL 61 - ER | Age: 70
Discharge: HOME | End: 2020-04-02
Payer: COMMERCIAL

## 2020-04-02 VITALS — DIASTOLIC BLOOD PRESSURE: 77 MMHG | SYSTOLIC BLOOD PRESSURE: 152 MMHG

## 2020-04-02 VITALS — WEIGHT: 209.44 LBS | BODY MASS INDEX: 29.32 KG/M2 | HEIGHT: 71 IN

## 2020-04-02 DIAGNOSIS — R10.32: ICD-10-CM

## 2020-04-02 DIAGNOSIS — Z87.891: ICD-10-CM

## 2020-04-02 DIAGNOSIS — Z98.890: ICD-10-CM

## 2020-04-02 DIAGNOSIS — M79.18: Primary | ICD-10-CM

## 2020-04-02 DIAGNOSIS — N18.6: ICD-10-CM

## 2020-04-02 DIAGNOSIS — G89.29: ICD-10-CM

## 2020-04-02 DIAGNOSIS — I50.9: ICD-10-CM

## 2020-04-02 DIAGNOSIS — I13.2: ICD-10-CM

## 2020-04-02 DIAGNOSIS — J44.9: ICD-10-CM

## 2020-04-02 LAB
ALBUMIN SERPL-MCNC: 3.8 G/DL (ref 3.4–5)
ALBUMIN/GLOB SERPL: 0.8 {RATIO} (ref 1–1.7)
ALP SERPL-CCNC: 84 U/L (ref 46–116)
ALT SERPL-CCNC: 19 U/L (ref 16–63)
ANION GAP SERPL CALC-SCNC: 13 MMOL/L (ref 6–14)
AST SERPL-CCNC: 14 U/L (ref 15–37)
BASOPHILS # BLD AUTO: 0.1 X10^3/UL (ref 0–0.2)
BASOPHILS NFR BLD: 1 % (ref 0–3)
BILIRUB SERPL-MCNC: 0.4 MG/DL (ref 0.2–1)
BUN SERPL-MCNC: 47 MG/DL (ref 8–26)
BUN/CREAT SERPL: 6 (ref 6–20)
CALCIUM SERPL-MCNC: 9.5 MG/DL (ref 8.5–10.1)
CHLORIDE SERPL-SCNC: 97 MMOL/L (ref 98–107)
CK SERPL-CCNC: 112 U/L (ref 39–308)
CO2 SERPL-SCNC: 27 MMOL/L (ref 21–32)
CREAT SERPL-MCNC: 8.4 MG/DL (ref 0.7–1.3)
EOSINOPHIL NFR BLD: 0.1 X10^3/UL (ref 0–0.7)
EOSINOPHIL NFR BLD: 1 % (ref 0–3)
ERYTHROCYTE [DISTWIDTH] IN BLOOD BY AUTOMATED COUNT: 17.7 % (ref 11.5–14.5)
GFR SERPLBLD BASED ON 1.73 SQ M-ARVRAT: 7.7 ML/MIN
GLOBULIN SER-MCNC: 4.6 G/DL (ref 2.2–3.8)
GLUCOSE SERPL-MCNC: 102 MG/DL (ref 70–99)
HCT VFR BLD CALC: 39.2 % (ref 39–53)
HGB BLD-MCNC: 13 G/DL (ref 13–17.5)
LIPASE: 70 U/L (ref 73–393)
LYMPHOCYTES # BLD: 1.2 X10^3/UL (ref 1–4.8)
LYMPHOCYTES NFR BLD AUTO: 12 % (ref 24–48)
MCH RBC QN AUTO: 35 PG (ref 25–35)
MCHC RBC AUTO-ENTMCNC: 33 G/DL (ref 31–37)
MCV RBC AUTO: 104 FL (ref 79–100)
MONO #: 0.9 X10^3/UL (ref 0–1.1)
MONOCYTES NFR BLD: 9 % (ref 0–9)
NEUT #: 7.6 X10^3/UL (ref 1.8–7.7)
NEUTROPHILS NFR BLD AUTO: 77 % (ref 31–73)
PLATELET # BLD AUTO: 148 X10^3/UL (ref 140–400)
POTASSIUM SERPL-SCNC: 5 MMOL/L (ref 3.5–5.1)
PROT SERPL-MCNC: 8.4 G/DL (ref 6.4–8.2)
RBC # BLD AUTO: 3.78 X10^6/UL (ref 4.3–5.7)
SODIUM SERPL-SCNC: 137 MMOL/L (ref 136–145)
WBC # BLD AUTO: 9.8 X10^3/UL (ref 4–11)

## 2020-04-02 PROCEDURE — 85025 COMPLETE CBC W/AUTO DIFF WBC: CPT

## 2020-04-02 PROCEDURE — 80053 COMPREHEN METABOLIC PANEL: CPT

## 2020-04-02 PROCEDURE — 36415 COLL VENOUS BLD VENIPUNCTURE: CPT

## 2020-04-02 PROCEDURE — 99284 EMERGENCY DEPT VISIT MOD MDM: CPT

## 2020-04-02 PROCEDURE — 74176 CT ABD & PELVIS W/O CONTRAST: CPT

## 2020-04-02 PROCEDURE — 82550 ASSAY OF CK (CPK): CPT

## 2020-04-02 PROCEDURE — 96374 THER/PROPH/DIAG INJ IV PUSH: CPT

## 2020-04-02 PROCEDURE — 83690 ASSAY OF LIPASE: CPT

## 2020-04-02 RX ADMIN — FENTANYL CITRATE ONE MCG: 50 INJECTION INTRAMUSCULAR; INTRAVENOUS at 20:46

## 2020-04-02 NOTE — PHYS DOC
Past Medical History


Past Medical History:  CAD, CHF, COPD, Diabetes-Type II, Hypertension, Renal 

Disease, Renal Failure, Other


Additional Past Medical Histor:  Chronic Right neck pain, LEFT UPPER ARM FISTULA


Past Surgical History:  Coronary Bypass Surgery, Other


Additional Past Surgical Histo:  R middle and lower lobe lung removal, vein 

graft left thigh, SHUNT RT CHEST


Smoking Status:  Former Smoker


Alcohol Use:  Sober


Drug Use:  None





Adult General


Chief Complaint


Chief Complaint:  ABDOMINAL PAIN





HPI


HPI





Patient is a 69  year old male with history of hypertension, diabetes mellitus, 

coronary artery disease a status post CABG, CHF, chronic renal failure on 

dialysis with last dialysis yesterday who presents via EMS with complaint of 

abdominal pain.  Patient states he has had constant left lower quadrant sharp 

pain since yesterday after finishing his dialysis with radiation to left flank 

that getting worse with movement and did not get better with applying IcyHot.  

Patient rated his pain 8/10 and denies nausea and vomiting, diarrhea and 

constipation, fever and chills.  Patient states he urinated a few times a day 

and did not have complaining of urinary symptoms.  Patient states that this pain

previously.  Patient states he had used to have oxycodone at home and ran out of

the pain medication since yesterday and decided to call 911 today for evaluation

of his pain.





Review of Systems


Review of Systems





Constitutional: Denies fever or chills []


Eyes: Denies change in visual acuity, redness, or eye pain []


HENT: Denies nasal congestion or sore throat []


Respiratory: Denies cough or shortness of breath []


Cardiovascular: No additional information not addressed in HPI []


GI: Denies  nausea, vomiting, bloody stools or diarrhea, reports abdominal pain 

[]


: Denies dysuria or hematuria []


Musculoskeletal: Denies back pain or joint pain []


Integument: Denies rash or skin lesions []


Neurologic: Denies headache, focal weakness or sensory changes []


Endocrine: Denies polyuria or polydipsia []





All other systems were reviewed and found to be within normal limits, except as 

documented in this note.





Current Medications


Current Medications





Current Medications








 Medications


  (Trade)  Dose


 Ordered  Sig/Anna  Start Time


 Stop Time Status Last Admin


Dose Admin


 


 Fentanyl Citrate


  (Fentanyl 2ml


 Vial)  50 mcg  1X  ONCE  20 20:00


 20 20:01 DC 20 20:46


50 MCG











Allergies


Allergies





Allergies








Coded Allergies Type Severity Reaction Last Updated Verified


 


  No Known Medication Allergies Allergy Unknown  19 Yes











Physical Exam


Physical Exam








Constitutional: Well developed, well nourished, mild distress, non-toxic 

appearance. []


HENT: Normocephalic, atraumatic.


Eyes: PERRLA, EOMI, conjunctiva normal, no discharge. [] 


Neck: Normal range of motion, no tenderness, supple, no stridor. [] 


Cardiovascular:Heart rate regular rhythm, no murmur []


Lungs & Thorax:  Bilateral breath sounds clear to auscultation []


Abdomen: Bowel sounds normal, soft, no tenderness, no masses, no pulsatile 

masses. [] 


Skin: Warm, dry, no erythema, no rash. [] 


Back: No tenderness, no CVA tenderness. [] 


Extremities: No tenderness, no cyanosis, no clubbing, ROM intact, no edema. [] 


Neurologic: Alert and oriented X 3, no focal deficits noted. []


Psychologic: Affect normal, judgement normal, mood normal. []





Current Patient Data


Vital Signs





                                   Vital Signs








  Date Time  Temp Pulse Resp B/P (MAP) Pulse Ox O2 Delivery O2 Flow Rate FiO2


 


20 22:15  72 20 152/77 (102) 93 Room Air  


 


20 19:07 99.0       





 99.0       








Lab Values





                                Laboratory Tests








Test


 20


20:20


 


White Blood Count


 9.8 x10^3/uL


(4.0-11.0)


 


Red Blood Count


 3.78 x10^6/uL


(4.30-5.70)  L


 


Hemoglobin


 13.0 g/dL


(13.0-17.5)


 


Hematocrit


 39.2 %


(39.0-53.0)


 


Mean Corpuscular Volume


 104 fL


()  H


 


Mean Corpuscular Hemoglobin 35 pg (25-35)  


 


Mean Corpuscular Hemoglobin


Concent 33 g/dL


(31-37)


 


Red Cell Distribution Width


 17.7 %


(11.5-14.5)  H


 


Platelet Count


 148 x10^3/uL


(140-400)


 


Neutrophils (%) (Auto) 77 % (31-73)  H


 


Lymphocytes (%) (Auto) 12 % (24-48)  L


 


Monocytes (%) (Auto) 9 % (0-9)  


 


Eosinophils (%) (Auto) 1 % (0-3)  


 


Basophils (%) (Auto) 1 % (0-3)  


 


Neutrophils # (Auto)


 7.6 x10^3/uL


(1.8-7.7)


 


Lymphocytes # (Auto)


 1.2 x10^3/uL


(1.0-4.8)


 


Monocytes # (Auto)


 0.9 x10^3/uL


(0.0-1.1)


 


Eosinophils # (Auto)


 0.1 x10^3/uL


(0.0-0.7)


 


Basophils # (Auto)


 0.1 x10^3/uL


(0.0-0.2)


 


Sodium Level


 137 mmol/L


(136-145)


 


Potassium Level


 5.0 mmol/L


(3.5-5.1)


 


Chloride Level


 97 mmol/L


()  L


 


Carbon Dioxide Level


 27 mmol/L


(21-32)


 


Anion Gap 13 (6-14)  


 


Blood Urea Nitrogen


 47 mg/dL


(8-26)  H


 


Creatinine


 8.4 mg/dL


(0.7-1.3)  H


 


Estimated GFR


(Cockcroft-Gault) 7.7  





 


BUN/Creatinine Ratio 6 (6-20)  


 


Glucose Level


 102 mg/dL


(70-99)  H


 


Calcium Level


 9.5 mg/dL


(8.5-10.1)


 


Total Bilirubin


 0.4 mg/dL


(0.2-1.0)


 


Aspartate Amino Transferase


(AST) 14 U/L (15-37)


L


 


Alanine Aminotransferase (ALT)


 19 U/L (16-63)





 


Alkaline Phosphatase


 84 U/L


()


 


Creatine Kinase


 112 U/L


()


 


Total Protein


 8.4 g/dL


(6.4-8.2)  H


 


Albumin


 3.8 g/dL


(3.4-5.0)


 


Albumin/Globulin Ratio


 0.8 (1.0-1.7)


L


 


Lipase


 70 U/L


()  L





                                Laboratory Tests


20 20:20








                                Laboratory Tests


20 20:20











EKG


EKG


[]





Radiology/Procedures


Radiology/Procedures


                            [facility]


                    [address]  [place], [place] [id number] [phone]


                                        


                                 IMAGING REPORT





                                     Signed





PATIENT: RAFIQ MENDES   ACCOUNT: [id number]     MRN#: [id number]


: 1950           LOCATION: ER              AGE: 69


SEX: M                    EXAM DT: 20         ACCESSION#: [id number]


STATUS: REG ER            ORD. PHYSICIAN: HUGO TAYLOR MD


REASON: Left lower quadrant pain with radiation to left flank


PROCEDURE: CT ABDOMEN PELVIS WO CONTRAST





CT scan of the abdomen and pelvis without contrast 2020


 


CLINICAL HISTORY: Left lower quadrant abdominal pain with left flank pain.


 


TECHNIQUE: Unenhanced, contiguous, 2 mm axial sections were obtained 


through the abdomen and pelvis.


 


One or more of the following individualized dose reduction techniques were


utilized for this study:


 


1. Automated exposure control.


2. Adjustment of the mA and/or kV according to patient size.


3. Use of iterative reconstruction technique.


 


 


FINDINGS: Comparison study is dated 2018.


 


Images through the lung bases demonstrate surgical changes consistent with


a CABG procedure. The heart is mildly enlarged. A 5 mm calcified granuloma


is seen involving left lower lobe. Dependent subsegmental atelectasis is 


seen involving both lower lobes, left greater than right.


 


The liver, spleen, pancreas, adrenal glands and left kidney are within 


normal limits. A 2.3 cm rounded low-attenuation lesion is seen involving 


the midpole of the right kidney. This likely represents a cyst. It has not


significantly changed. No further imaging workup is recommended.


 


Atherosclerotic calcification of the abdominal aorta and its branches is 


noted. The abdominal aorta tapers normally. The gallbladder is 


well-distended. Air and stool are seen throughout the colon.


 


Images through the pelvis demonstrate the urinary bladder to be slightly 


contracted. A 4.2 cm diverticulum is seen effecting laterally from the 


right posterior lateral aspect of the urinary bladder, unchanged. 


Calcification are seen within the pelvis consistent with phleboliths. No 


free fluid is noted. No distal ureteral calculus is seen. Mild to moderate


S-shaped curvature of the thoracolumbar spine is seen. Degenerative 


changes are seen involving the lower thoracic and throughout the lumbar 


spine along with both hips.


 


IMPRESSION: No acute abnormality is seen.


 


Electronically signed by: Tung Bernard MD (2020 8:50 PM) [id number]














DICTATED and SIGNED BY:     TUNG BERNARD MD


DATE:     20





Course & Med Decision Making


Course & Med Decision Making


Pertinent Labs and Imaging studies reviewed. (See chart for details)





Evaluation of patient inertial 6-year-old male patient with complaining of left 

lower quadrant pain with radiation to left flank since yesterday as a constant 

pain with other symptoms.  Patient had unremarkable physical exam and treated 

with fentanyl and felt better.  CT of abdomen pelvis and labs was unremarkable 

except for chronic renal failure.  Patient was advised to follow-up with his 

primary care physician and prescription for Flexeril was given.





Dragon Disclaimer


Dragon Disclaimer


This electronic medical record was generated, in whole or in part, using a voice

 recognition dictation system.





Departure


Departure


Impression:  


   Primary Impression:  


   Musculoskeletal pain


   Additional Impression:  


   Chronic kidney disease with end stage renal failure on dialysis


Disposition:   HOME, SELF-CARE (At 2154)


Condition:  IMPROVED


Referrals:  


KIESHA NUNO MD (PCP)


Patient Instructions:  Muscle Strain





Additional Instructions:  


Continue home medication and scheduled dialysis


Follow-up with your primary care physician in 3-5 days


Return to ER if not getting better





Thank you for visiting [facility]. We appreciate you trusting us 

with your care. If any additional problems come up don't hesitate to return to 

visit us. Please follow up with your primary care provider so they can plan 

additional care if needed and know about the problem that you had. If symptoms 

worsen come back to the Emergency Department. Any concerning symptoms that start

 such as chest pain, shortness of air, weakness or numbness on one side of the 

body, running high fevers or any other concerning symptoms return to the ER.


Scripts


Cyclobenzaprine Hcl (CYCLOBENZAPRINE HCL) 10 Mg Tablet


1 TAB PO TID, #21 TAB


   Prov: HUGO TAYLOR MD         20





Problem Qualifiers











HUGO TAYLOR MD              2020 20:37

## 2020-04-02 NOTE — RAD
CT scan of the abdomen and pelvis without contrast 4/2/2020

 

CLINICAL HISTORY: Left lower quadrant abdominal pain with left flank pain.

 

TECHNIQUE: Unenhanced, contiguous, 2 mm axial sections were obtained 

through the abdomen and pelvis.

 

One or more of the following individualized dose reduction techniques were

utilized for this study:

 

1. Automated exposure control.

2. Adjustment of the mA and/or kV according to patient size.

3. Use of iterative reconstruction technique.

 

 

FINDINGS: Comparison study is dated 5/23/2018.

 

Images through the lung bases demonstrate surgical changes consistent with

a CABG procedure. The heart is mildly enlarged. A 5 mm calcified granuloma

is seen involving left lower lobe. Dependent subsegmental atelectasis is 

seen involving both lower lobes, left greater than right.

 

The liver, spleen, pancreas, adrenal glands and left kidney are within 

normal limits. A 2.3 cm rounded low-attenuation lesion is seen involving 

the midpole of the right kidney. This likely represents a cyst. It has not

significantly changed. No further imaging workup is recommended.

 

Atherosclerotic calcification of the abdominal aorta and its branches is 

noted. The abdominal aorta tapers normally. The gallbladder is 

well-distended. Air and stool are seen throughout the colon.

 

Images through the pelvis demonstrate the urinary bladder to be slightly 

contracted. A 4.2 cm diverticulum is seen effecting laterally from the 

right posterior lateral aspect of the urinary bladder, unchanged. 

Calcification are seen within the pelvis consistent with phleboliths. No 

free fluid is noted. No distal ureteral calculus is seen. Mild to moderate

S-shaped curvature of the thoracolumbar spine is seen. Degenerative 

changes are seen involving the lower thoracic and throughout the lumbar 

spine along with both hips.

 

IMPRESSION: No acute abnormality is seen.

 

Electronically signed by: Tung Bernard MD (4/2/2020 8:50 PM) [id number]

## 2020-04-04 ENCOUNTER — HOSPITAL ENCOUNTER (EMERGENCY)
Dept: HOSPITAL 61 - ER | Age: 70
Discharge: HOME | End: 2020-04-04
Payer: COMMERCIAL

## 2020-04-04 VITALS — WEIGHT: 204.37 LBS | HEIGHT: 71 IN | BODY MASS INDEX: 28.61 KG/M2

## 2020-04-04 VITALS — DIASTOLIC BLOOD PRESSURE: 90 MMHG | SYSTOLIC BLOOD PRESSURE: 162 MMHG

## 2020-04-04 DIAGNOSIS — I13.2: ICD-10-CM

## 2020-04-04 DIAGNOSIS — N18.6: ICD-10-CM

## 2020-04-04 DIAGNOSIS — Z95.1: ICD-10-CM

## 2020-04-04 DIAGNOSIS — I50.9: ICD-10-CM

## 2020-04-04 DIAGNOSIS — E11.22: Primary | ICD-10-CM

## 2020-04-04 DIAGNOSIS — J44.9: ICD-10-CM

## 2020-04-04 DIAGNOSIS — Z99.2: ICD-10-CM

## 2020-04-04 DIAGNOSIS — Z87.891: ICD-10-CM

## 2020-04-04 DIAGNOSIS — R10.9: ICD-10-CM

## 2020-04-04 DIAGNOSIS — G89.29: ICD-10-CM

## 2020-04-04 DIAGNOSIS — I25.10: ICD-10-CM

## 2020-04-04 PROCEDURE — 99284 EMERGENCY DEPT VISIT MOD MDM: CPT

## 2020-04-04 PROCEDURE — 93005 ELECTROCARDIOGRAM TRACING: CPT

## 2020-04-04 NOTE — PHYS DOC
Past Medical History


Past Medical History:  CAD, CHF, COPD, Diabetes-Type II, Hypertension, Renal 

Disease, Renal Failure, Other


Additional Past Medical Histor:  Chronic Right neck pain, LEFT UPPER ARM FISTULA


Past Surgical History:  Coronary Bypass Surgery, Other


Additional Past Surgical Histo:  R middle and lower lobe lung removal, vein 

graft left thigh, SHUNT RT CHEST


Smoking Status:  Former Smoker


Alcohol Use:  Sober


Drug Use:  None





Adult General


Chief Complaint


Chief Complaint:  DIALYSIS PROBLEM





HPI


HPI





Patient is a 69  year old male with history of COPD, hypertension, high 

cholesterol, diabetes type 2, end-stage kidney disease on dialysis Monday Wednesday Friday last dialyzed on Wednesday who presents the ED today 

complaining of 10 out of 10 left-sided abdominal pain, symptoms began on 

Wednesday.  Patient reports being seen in the ED on Wednesday which is 3 days 

ago for the same complaint, he reports he was worked up and everything was 

negative and was sent home with a muscle relaxer.  He states it is not working 

for his pain.  He states he needs some oxycodone for his pain because there is 

no way he can go to dialysis with this pain and is on oxycodone daily but has 

only 8 tablets left which he believes will not last until Monday.  He states he 

cannot call his PCP because it is a weekend.  He is very insistent on getting 

his pain medicine.  He states he does not want any work-up he wants something 

for pain for here and home use.





Review of Systems


Review of Systems





Constitutional: Denies fever or chills []


Eyes: Denies change in visual acuity, redness, or eye pain []


HENT: Denies nasal congestion or sore throat []


Respiratory: Denies cough or shortness of breath []


Cardiovascular: No additional information not addressed in HPI []


GI: Reports left-sided abdominal pain, denies nausea, vomiting, bloody stools or

 diarrhea []


: Denies dysuria or hematuria []


Musculoskeletal: Denies back pain or joint pain []


Integument: Denies rash or skin lesions []


Neurologic: Denies headache, focal weakness or sensory changes []








All other systems were reviewed and found to be within normal limits, except as 

documented in this note.





Current Medications


Current Medications





Current Medications








 Medications


  (Trade)  Dose


 Ordered  Sig/Anna  Start Time


 Stop Time Status Last Admin


Dose Admin


 


 Oxycodone/


 Acetaminophen


  (Percocet 5/325)  2 tab  1X  ONCE  4/4/20 19:45


 4/4/20 19:46 UNV  














Allergies


Allergies





Allergies








Coded Allergies Type Severity Reaction Last Updated Verified


 


  No Known Medication Allergies Allergy Unknown  12/5/19 Yes











Physical Exam


Physical Exam





Constitutional: Well developed, well nourished, no acute distress, non-toxic 

appearance. []


HENT: Normocephalic, atraumatic, bilateral external ears normal, oropharynx 

moist, no oral exudates, nose normal. []


Eyes: PERRLA, EOMI, conjunctiva normal, no discharge. [] 


Neck: Normal range of motion, no tenderness, supple, no stridor. [] 


Cardiovascular:Heart rate regular rhythm, no murmur []


Lungs & Thorax:  Bilateral breath sounds clear to auscultation []


Abdomen: Bowel sounds normal, soft, no tenderness, no masses, no pulsatile 

masses. [] 


Skin: Warm, dry, no erythema, no rash. [] 


Back: No tenderness, no CVA tenderness. [] 


Extremities: No tenderness, no cyanosis, no clubbing, ROM intact, chronic 

lymphedema to bilateral lower extremities


Neurologic: Alert and oriented X 3, normal motor function, normal sensory 

function, no focal deficits noted. []


Psychologic: Affect normal, judgement normal, mood normal. []





Current Patient Data


Vital Signs





                                   Vital Signs








  Date Time  Temp Pulse Resp B/P (MAP) Pulse Ox O2 Delivery O2 Flow Rate FiO2


 


4/4/20 19:08 98.9 68 18 158/89 (112) 93 Room Air  





 98.9       











EKG


EKG


[]





Radiology/Procedures


Radiology/Procedures


[]





Course & Med Decision Making


Course & Med Decision Making


Pertinent Labs and Imaging studies reviewed. (See chart for details)





This is a 69-year-old male patient presenting to the ED today complaining of 

left-sided abdominal pain that began on Wednesday which is roughly 3 days from 

today.  He was seen in the ED for the same complaint 3 days ago and had a full 

work-up including labs and CAT scan which were negative and he was sent home 

with cyclobenzaprine.  He insisted its not working for his pain he wants 

oxycodone which he is very insistent on getting.  He states he has been taking 

oxycodone for years and he currently only has 8 tablets left which will not take

 him until Monday.





[facility] shows he filled RX for Oxycodone 10/325mg 30 day supply on 3/13/2020.  

Informed patient it would be ideal he follows up with his own PCP for refills of

 his oxycodone otherwise from the ED standpoint we cannot send him home with a 

prescription for oxycodone.  Informed patient considering he is not due for a 

refill no pharmacy will fill this prescription.  Patient stated he can go to 

[place] and fill the prescription if [place] can not fill it.  Patient continued

 to argue for quite some time basically negotiating for a prescription for 

oxycodone.  He even states his brother is addicted to drugs and goes to multiple

 hospitals and nobody will give him a prescriptions for pain medicines but he 

states he is not addicted to drugs.  Informed patient we will give him 2 tablets

 of oxycodone in the ED and he can follow-up with his own doctor.  He refused to

 be worked up.  He was discharged to home.





Dragon Disclaimer


Dragon Disclaimer


This electronic medical record was generated, in whole or in part, using a voice

 recognition dictation system.





Departure


Departure


Impression:  


   Primary Impression:  


   Left sided abdominal pain


   Additional Impression:  


   ESRD (end stage renal disease) on dialysis


Disposition:  01 HOME, SELF-CARE


Condition:  STABLE


Referrals:  


KIESHA NUNO MD (PCP)


follow up next week


Patient Instructions:  Abdominal Pain





Additional Instructions:  


You were evaluated in the emergency room, contact your doctor tomorrow and set 

up a follow-up appointment to get a refill of your oxycodone.





Problem Qualifiers











HATTIE PRATER               Apr 4, 2020 19:55

## 2020-04-04 NOTE — EKG
[facility]

              [address] [place] [address]

Test Date:    2020               Test Time:    19:18:14

Pat Name:     RAFIQ MENDES             Department:   

Patient ID:   [id number]           Room:          

Gender:                               Technician:   

:          1950               Requested By: HATTIE PRATER

Order Number: [id number]           Reading MD:     

                                 Measurements

Intervals                              Axis          

Rate:         81                       P:            33

TN:           178                      QRS:          -20

QRSD:         110                      T:            124

QT:           386                                    

QTc:          454                                    

                           Interpretive Statements

SINUS RHYTHM

LEFT ATRIAL ABNORMALITY

LEFTWARD AXIS

T ABNORMALITY IN HIGH LATERAL LEADS

ABNORMAL ECG

No previous ECG available for comparison

## 2020-04-09 ENCOUNTER — HOSPITAL ENCOUNTER (EMERGENCY)
Dept: HOSPITAL 61 - ER | Age: 70
Discharge: HOME | End: 2020-04-09
Payer: COMMERCIAL

## 2020-04-09 VITALS — WEIGHT: 204.37 LBS | HEIGHT: 71 IN | BODY MASS INDEX: 28.61 KG/M2

## 2020-04-09 VITALS — DIASTOLIC BLOOD PRESSURE: 76 MMHG | SYSTOLIC BLOOD PRESSURE: 150 MMHG

## 2020-04-09 DIAGNOSIS — I25.10: ICD-10-CM

## 2020-04-09 DIAGNOSIS — J44.9: ICD-10-CM

## 2020-04-09 DIAGNOSIS — R10.9: ICD-10-CM

## 2020-04-09 DIAGNOSIS — I50.9: ICD-10-CM

## 2020-04-09 DIAGNOSIS — E11.22: ICD-10-CM

## 2020-04-09 DIAGNOSIS — G89.29: Primary | ICD-10-CM

## 2020-04-09 DIAGNOSIS — N18.9: ICD-10-CM

## 2020-04-09 DIAGNOSIS — Z87.891: ICD-10-CM

## 2020-04-09 DIAGNOSIS — I13.0: ICD-10-CM

## 2020-04-09 DIAGNOSIS — Z95.1: ICD-10-CM

## 2020-04-09 PROCEDURE — 99283 EMERGENCY DEPT VISIT LOW MDM: CPT

## 2020-04-09 NOTE — PHYS DOC
Past Medical History


Past Medical History:  CAD, CHF, COPD, Diabetes-Type II, Hypertension, Renal 

Disease, Renal Failure, Other


Additional Past Medical Histor:  Chronic Right neck pain,LEFT UPPER ARM FISTULA


Past Surgical History:  Coronary Bypass Surgery, Other


Additional Past Surgical Histo:  R middle and lower lobe lung removal, vein 

graft left thigh, SHUNT RT CHEST


Smoking Status:  Former Smoker


Alcohol Use:  Sober


Drug Use:  None





General Adult


EDM:


Chief Complaint:  MEDICATION REFILL





HPI:


HPI:





Patient is a 69  year old AA male who presents to the emergency department with 

request for pain medication.  Patient states he is out of his oxycodone and is 

unable to fill it for another 3 days.  Patient reports that he went to dialysis 

today and that they took the fluid off of him too quickly which always causes 

him to have abdominal pain. He denies any fever, cough, nausea, vomiting, 

diarrhea, chest pain, palpitations, or shortness of breath.  He denies any 

recent travel or known exposure to anyone with COVID-19. Patient states that he 

has been to this emergency department twice in the last week and they did not do

anything for him. He states he does not want anything but pain medication, 

patient refuses lab work and imaging tests that are offered. Patient reports 

that he wears oxygen every night, on arrival to the room he was 91% on room air,

his sats increased to 93/94% with 2 L per nasal cannula.  He currently rates the

pain a 9 out of 10 on the pain scale in his entire abdomen.  Patient states the 

only thing that helps his pain is his hydrocodone that he is unable to fill 

until the 12th.





Review of Systems:


Review of Systems:


Constitutional:  Denies fever or chills. []


Eyes:  Denies change in visual acuity. []


HENT:  Denies nasal congestion or sore throat. [] 


Respiratory:  Denies cough or shortness of breath. [] 


Cardiovascular:  Denies chest pain or edema. [] 


GI:  Denies nausea, vomiting, bloody stools or diarrhea; see HPI


Musculoskeletal:  Denies back pain or joint pain. [] 


Integument:  Denies rash. [] 


Neurologic:  Denies headache, focal weakness or sensory changes. [] 


Psychiatric:  Denies depression or anxiety. []





Heart Score:


Risk Factors:


Risk Factors:  DM, Current or recent (<one month) smoker, HTN, HLP, family 

history of CAD, obesity.


Risk Scores:


Score 0 - 3:  2.5% MACE over next 6 weeks - Discharge Home


Score 4 - 6:  20.3% MACE over next 6 weeks - Admit for Clinical Observation


Score 7 - 10:  72.7% MACE over next 6 weeks - Early Invasive Strategies





Current Medications:





Current Medications








 Medications


  (Trade)  Dose


 Ordered  Sig/Anna  Start Time


 Stop Time Status Last Admin


Dose Admin


 


 Acetaminophen/


 Hydrocodone Bitart


  (Lortab 5/325)  2 tab  1X  ONCE  4/9/20 17:15


 4/9/20 17:16   














Allergies:


Allergies:





Allergies








Coded Allergies Type Severity Reaction Last Updated Verified


 


  No Known Medication Allergies Allergy Unknown  12/5/19 Yes











Physical Exam:


PE:





Constitutional: Well developed, well nourished, no acute distress, non-toxic 

appearance, agitated. []


HENT: Normocephalic, atraumatic, bilateral external ears normal, nose normal. []


Eyes: PERRLA, EOMI, conjunctiva normal, no discharge. [] 


Neck: Normal range of motion, no stridor. [] 


Cardiovascular:Heart rate regular rhythm


Lungs & Thorax:  Respirations even and unlabored, no retractions, no respiratory

 distress


Abdomen: soft, no tenderness


Skin: Warm, dry, no erythema, no rash. [] 


Extremities: No cyanosis, ROM intact


Neurologic: Alert and oriented X 3, no focal deficits noted. []


Psychologic: Affect angry, judgement normal, mood normal. []





Current Patient Data:


Vital Signs:





                                   Vital Signs








  Date Time  Temp Pulse Resp B/P (MAP) Pulse Ox O2 Delivery O2 Flow Rate FiO2


 


4/9/20 16:51 98.8 91 24 143/80 (101) 91 Room Air  





 98.8       











EKG:


EKG:


[]





Radiology/Procedures:


Radiology/Procedures:


[]





Course & Med Decision Making:


Course & Med Decision Making


Pertinent Labs and Imaging studies reviewed. (See chart for details)


Patient is a 69-year-old -American gentleman who presented to the 

emergency department with request for pain medication.  He stated that his 

chronic abdominal pain that occurs after he has dialysis has flared up again and

 states that he is out of his pain medication.  Patient refused any lab work or 

imaging.  His vital signs are stable, he denied any fever or shortness of 

breath.  Patient was given 2 hydrocodone 5/325 mg tablets.  I advised the 

patient that I would not prescribe hydrocodone as the patient already has a 

prescription for the medication in hand.  Advised the patient to fill the 

medication at his pharmacy when he is able to.


[]





Dragon Disclaimer:


Dragon Disclaimer:


This electronic medical record was generated, in whole or in part, using a voice

 recognition dictation system.





Departure


Departure


Impression:  


   Primary Impression:  


   Chronic abdominal pain


Disposition:  01 HOME, SELF-CARE


Condition:  STABLE


Referrals:  


KIESHA NUNO MD (PCP)


Patient Instructions:  Chronic Pain Management-Brief, Opiate Dependence





Additional Instructions:  


Follow up with your primary care doctor for further management of your chronic 

pain. Return to the ER if you develop a fever, or your symptoms worsen.











CHRISTIANNE MULLER APRN        Apr 9, 2020 17:19

## 2020-04-29 ENCOUNTER — HOSPITAL ENCOUNTER (EMERGENCY)
Dept: HOSPITAL 61 - ER | Age: 70
Discharge: HOME | End: 2020-04-29
Payer: COMMERCIAL

## 2020-04-29 VITALS — BODY MASS INDEX: 28.61 KG/M2 | HEIGHT: 71 IN | WEIGHT: 204.37 LBS

## 2020-04-29 VITALS — SYSTOLIC BLOOD PRESSURE: 128 MMHG | DIASTOLIC BLOOD PRESSURE: 75 MMHG

## 2020-04-29 DIAGNOSIS — N18.9: ICD-10-CM

## 2020-04-29 DIAGNOSIS — Z98.890: ICD-10-CM

## 2020-04-29 DIAGNOSIS — Z87.891: ICD-10-CM

## 2020-04-29 DIAGNOSIS — Z90.89: ICD-10-CM

## 2020-04-29 DIAGNOSIS — J44.9: ICD-10-CM

## 2020-04-29 DIAGNOSIS — I50.9: ICD-10-CM

## 2020-04-29 DIAGNOSIS — I11.0: ICD-10-CM

## 2020-04-29 DIAGNOSIS — G89.29: ICD-10-CM

## 2020-04-29 DIAGNOSIS — K59.00: Primary | ICD-10-CM

## 2020-04-29 DIAGNOSIS — R10.32: ICD-10-CM

## 2020-04-29 DIAGNOSIS — E11.9: ICD-10-CM

## 2020-04-29 LAB
ALBUMIN SERPL-MCNC: 3.3 G/DL (ref 3.4–5)
ALBUMIN/GLOB SERPL: 0.6 {RATIO} (ref 1–1.7)
ALP SERPL-CCNC: 91 U/L (ref 46–116)
ALT SERPL-CCNC: < 6 U/L (ref 16–63)
ANION GAP SERPL CALC-SCNC: 9 MMOL/L (ref 6–14)
AST SERPL-CCNC: 15 U/L (ref 15–37)
BASOPHILS # BLD AUTO: 0 X10^3/UL (ref 0–0.2)
BASOPHILS NFR BLD: 1 % (ref 0–3)
BILIRUB SERPL-MCNC: 0.3 MG/DL (ref 0.2–1)
BUN SERPL-MCNC: 53 MG/DL (ref 8–26)
BUN/CREAT SERPL: 5 (ref 6–20)
CALCIUM SERPL-MCNC: 9.4 MG/DL (ref 8.5–10.1)
CHLORIDE SERPL-SCNC: 98 MMOL/L (ref 98–107)
CO2 SERPL-SCNC: 34 MMOL/L (ref 21–32)
CREAT SERPL-MCNC: 10.7 MG/DL (ref 0.7–1.3)
EOSINOPHIL NFR BLD: 0.3 X10^3/UL (ref 0–0.7)
EOSINOPHIL NFR BLD: 4 % (ref 0–3)
ERYTHROCYTE [DISTWIDTH] IN BLOOD BY AUTOMATED COUNT: 17.3 % (ref 11.5–14.5)
GFR SERPLBLD BASED ON 1.73 SQ M-ARVRAT: 5.8 ML/MIN
GLOBULIN SER-MCNC: 5.2 G/DL (ref 2.2–3.8)
GLUCOSE SERPL-MCNC: 96 MG/DL (ref 70–99)
HCT VFR BLD CALC: 35.3 % (ref 39–53)
HGB BLD-MCNC: 11.5 G/DL (ref 13–17.5)
LIPASE: 72 U/L (ref 73–393)
LYMPHOCYTES # BLD: 0.9 X10^3/UL (ref 1–4.8)
LYMPHOCYTES NFR BLD AUTO: 12 % (ref 24–48)
MCH RBC QN AUTO: 33 PG (ref 25–35)
MCHC RBC AUTO-ENTMCNC: 33 G/DL (ref 31–37)
MCV RBC AUTO: 103 FL (ref 79–100)
MONO #: 0.9 X10^3/UL (ref 0–1.1)
MONOCYTES NFR BLD: 12 % (ref 0–9)
NEUT #: 5.2 X10^3/UL (ref 1.8–7.7)
NEUTROPHILS NFR BLD AUTO: 71 % (ref 31–73)
PLATELET # BLD AUTO: 221 X10^3/UL (ref 140–400)
POTASSIUM SERPL-SCNC: 5.5 MMOL/L (ref 3.5–5.1)
PROT SERPL-MCNC: 8.5 G/DL (ref 6.4–8.2)
PROTHROMBIN TIME: 14.1 SEC (ref 11.7–14)
RBC # BLD AUTO: 3.43 X10^6/UL (ref 4.3–5.7)
SODIUM SERPL-SCNC: 141 MMOL/L (ref 136–145)
WBC # BLD AUTO: 7.3 X10^3/UL (ref 4–11)

## 2020-04-29 PROCEDURE — 96372 THER/PROPH/DIAG INJ SC/IM: CPT

## 2020-04-29 PROCEDURE — 85025 COMPLETE CBC W/AUTO DIFF WBC: CPT

## 2020-04-29 PROCEDURE — 85610 PROTHROMBIN TIME: CPT

## 2020-04-29 PROCEDURE — 83880 ASSAY OF NATRIURETIC PEPTIDE: CPT

## 2020-04-29 PROCEDURE — 72110 X-RAY EXAM L-2 SPINE 4/>VWS: CPT

## 2020-04-29 PROCEDURE — 72072 X-RAY EXAM THORAC SPINE 3VWS: CPT

## 2020-04-29 PROCEDURE — 83605 ASSAY OF LACTIC ACID: CPT

## 2020-04-29 PROCEDURE — 36415 COLL VENOUS BLD VENIPUNCTURE: CPT

## 2020-04-29 PROCEDURE — 84484 ASSAY OF TROPONIN QUANT: CPT

## 2020-04-29 PROCEDURE — 80053 COMPREHEN METABOLIC PANEL: CPT

## 2020-04-29 PROCEDURE — 83690 ASSAY OF LIPASE: CPT

## 2020-04-29 PROCEDURE — 93005 ELECTROCARDIOGRAM TRACING: CPT

## 2020-04-29 PROCEDURE — 96374 THER/PROPH/DIAG INJ IV PUSH: CPT

## 2020-04-29 PROCEDURE — 74176 CT ABD & PELVIS W/O CONTRAST: CPT

## 2020-04-29 PROCEDURE — 74022 RADEX COMPL AQT ABD SERIES: CPT

## 2020-04-29 PROCEDURE — 99285 EMERGENCY DEPT VISIT HI MDM: CPT

## 2020-04-29 NOTE — EKG
[facility]

              [address] [place] [address]

Test Date:    2020               Test Time:    13:57:43

Pat Name:     RAFIQ MENDES             Department:   

Patient ID:   [id number]           Room:          

Gender:       M                        Technician:   

:          1950               Requested By: PHIL CARDONA

Order Number: [id number]           Reading MD:   Leander Bernstein MD

                                 Measurements

Intervals                              Axis          

Rate:         77                       P:            24

WI:           210                      QRS:          -16

QRSD:         122                      T:            136

QT:           414                                    

QTc:          470                                    

                           Interpretive Statements

SINUS RHYTHM

LEFT ATRIAL ABNORMALITY

LEFTWARD AXIS

LVH WITH REPOLARIZATION ABNORMALITY

ABNORMAL ECG



Electronically Signed On 2020 8:34:16 CDT by Leander Bernstein MD

## 2020-04-29 NOTE — RAD
Thoracic spine 3 views:

 

Reason for examination: Pain with history of osteomyelitis.

 

The vertebral bodies of the thoracic spine show a rotatory thoracolumbar 

scoliosis with the thoracic convexity to the right. There is poor 

mineralization. No definite site of fracture or subluxation is evident. No

gross abnormalities are seen at the pedicles or posterior elements. The 

intervertebral discs appear to be fairly well-maintained. 

 

IMPRESSION:

 

Thoracic scoliosis with convexity to the right. No acute abnormality 

apparent in the thoracic spine.

 

 

Lumbar spine 5 views:

 

There is a thoracolumbar cyst scoliosis with the lumbar convexity to the 

left. There is generalized demineralization. There appears to be some mild

depression of the superior endplate of the L1 vertebral body and superior 

and inferior endplates of the L2 vertebral body. Remaining vertebral 

bodies appear to be intact. No subluxation is apparent. The posterior 

elements appear to be intact. The intervertebral discs appear to be 

maintained. No gross abnormalities are seen at the visualized portion of 

the sacrum or sacroiliac joints.

 

IMPRESSION:

 

Lumbar scoliosis with convexity to the left. Mild depression of the 

superior endplate of L1 and superior and inferior endplates of L2.

 

Electronically signed by: Bernie Beyer MD (4/29/2020 4:02 PM) [email or web address]

## 2020-04-29 NOTE — PHYS DOC
Past Medical History


Past Medical History:  CAD, CHF, COPD, Diabetes-Type II, Hypertension, Renal 

Disease, Renal Failure, Other


Additional Past Medical Histor:  Chronic Right neck pain,LEFT UPPER ARM FISTULA


Past Surgical History:  Coronary Bypass Surgery, Other


Additional Past Surgical Histo:  R middle and lower lobe lung removal, vein 

graft left thigh, SHUNT RT CHEST


Smoking Status:  Former Smoker


Alcohol Use:  Sober


Drug Use:  None





General Adult


EDM:


Chief Complaint:  CHEST PAIN





HPI:


HPI:





Patient is a 69  year old male who presents with with the same sharp 

intermittent left lower abdominal pain for the last 5 to 6 months.  He states he

just cannot take it anymore.  Patient was diagnosed with osteomyelitis in his 

spine 7 days ago and was admitted and discharged 5 days ago.  Patient states the

pain continues and nobody is fixing him.  He rates his pain 10 out of 10.  He 

states it hurts more with movement.  Patient has a history of CHF, alcoholism, 

diabetes, renal failure, cellulitis, osteomyelitis.  He states he did not go to 

dialysis today due to the pain and it hurt too bad to come and get out of bed.





Review of Systems:


Review of Systems:





GI:  abdominal pain, denies nausea, vomiting, bloody stools or diarrhea. []





Heart Score:


Risk Factors:


Risk Factors:  DM, Current or recent (<one month) smoker, HTN, HLP, family 

history of CAD, obesity.


Risk Scores:


Score 0 - 3:  2.5% MACE over next 6 weeks - Discharge Home


Score 4 - 6:  20.3% MACE over next 6 weeks - Admit for Clinical Observation


Score 7 - 10:  72.7% MACE over next 6 weeks - Early Invasive Strategies





Allergies:


Allergies:





Allergies








Coded Allergies Type Severity Reaction Last Updated Verified


 


  No Known Drug Allergies    20 No











Physical Exam:


PE:





Constitutional: Well developed, well nourished, no acute distress, non-toxic 

appearance. []


HENT: Normocephalic, atraumatic, bilateral external ears normal, oropharynx 

moist, no oral exudates, nose normal. []


Eyes: PERRLA, EOMI, conjunctiva normal, no discharge. [] 


Neck: Normal range of motion, no tenderness, supple, no stridor. [] 


Cardiovascular:Heart rate regular rhythm, no murmur []


Lungs & Thorax:  Bilateral breath sounds clear to auscultation []


Abdomen: Bowel sounds normal, soft, no tenderness, no masses, no pulsatile 

masses. [] 


Skin: Warm, dry, no erythema, no rash. [] 


Back: No tenderness, no CVA tenderness. [] 


Extremities: No tenderness, no cyanosis, no clubbing, ROM intact, 3+ edema. [] 


Neurologic: Alert and oriented X 3, normal motor function, normal sensory 

function, no focal deficits noted. []


Psychologic: Affect normal, judgement normal, mood normal. []





EKG:


EK and read by Dr Tellez. Sinus Rhythm with questionable lateral T-wave 

inversion worsening





Radiology/Procedures:


Radiology/Procedures:


[]


Impression:


                            [address] [address]


                                 [phone]


                                        


                                 IMAGING REPORT





                                     Signed





PATIENT: RAFIQ MENDES   ACCOUNT: [id number]     MRN#: [id number]


: 1950           LOCATION: ER              AGE: 69


SEX: M                    EXAM DT: 20         ACCESSION#: [id number]


STATUS: REG ER            ORD. PHYSICIAN: PHIL CARDONA


REASON: left lower pain, PT GETTING IV@1354


PROCEDURE: ACUTE ABDOMEN SERIES





Examination: Acute abdomen series


 


HISTORY: History of left lower abdominal pain


 


COMPARISON: 2020


 


FINDINGS:


 


 


 


Low lung volumes and technique accentuates heart size and pulmonary 


vascularity. Moderate cardiomegaly. Prominent appearing bilateral 


interstitial lung markings likely chronic interstitial changes similar to 


prior exam. Large amount of stool identified throughout the colon. Air 


distended small bowel loops.


 


IMPRESSION:


 


1. Large amount of stool identified throughout the colon could be 


significant fecal impaction or constipation.Air distended small bowel 


loops. Consider follow-up CT.


 


Electronically signed by: Mnae Guerin MD (2020 3:34 PM) [id number]














DICTATED and SIGNED BY:     MANE GUERIN MD


DATE:     20 1534








                            [address] [id number] [phone]


                                        


                                 IMAGING REPORT





                                     Signed





PATIENT: RAFIQ MENDES   ACCOUNT: [id number]     MRN#: [id number]


: 1950           LOCATION: ER              AGE: 69


SEX: M                    EXAM DT: 20         ACCESSION#: [id number]


STATUS: REG ER            ORD. PHYSICIAN: PHIL CARDONA


REASON: HX OSTEOMYLITIS, SAME PAIN. JUST DISCHARGED


PROCEDURE: THORACIC SPINE 3V





Thoracic spine 3 views:


 


Reason for examination: Pain with history of osteomyelitis.


 


The vertebral bodies of the thoracic spine show a rotatory thoracolumbar 


scoliosis with the thoracic convexity to the right. There is poor 


mineralization. No definite site of fracture or subluxation is evident. No


gross abnormalities are seen at the pedicles or posterior elements. The 


intervertebral discs appear to be fairly well-maintained. 


 


IMPRESSION:


 


Thoracic scoliosis with convexity to the right. No acute abnormality 


apparent in the thoracic spine.


 


 


Lumbar spine 5 views:


 


There is a thoracolumbar cyst scoliosis with the lumbar convexity to the 


left. There is generalized demineralization. There appears to be some mild


depression of the superior endplate of the L1 vertebral body and superior 


and inferior endplates of the L2 vertebral body. Remaining vertebral 


bodies appear to be intact. No subluxation is apparent. The posterior 


elements appear to be intact. The intervertebral discs appear to be 


maintained. No gross abnormalities are seen at the visualized portion of 


the sacrum or sacroiliac joints.


 


IMPRESSION:


 


Lumbar scoliosis with convexity to the left. Mild depression of the 


superior endplate of L1 and superior and inferior endplates of L2.


 


Electronically signed by: Kita Laguerre MD (2020 4:02 PM) [email or web address]














DICTATED and SIGNED BY:     KITA LAGUERRE MD


DATE:     20 1602





                            [facility]


                    [address] [place] [address]


                                 [phone]


                                        


                                 IMAGING REPORT





                                     Signed





PATIENT: RAFIQ MENDES   ACCOUNT: [id number]     MRN#: [id number]


: 1950           LOCATION: ER              AGE: 69


SEX: M                    EXAM DT: 20         ACCESSION#: [id number]


STATUS: REG ER            ORD. PHYSICIAN: PHIL CARDONA


REASON: ABNORMAL ACUTE ABDOMINAL XRAY


PROCEDURE: CT ABDOMEN PELVIS WO CONTRAST





CT abdomen and pelvis without contrast:


 


Reason for examination: Abnormal acute abdominal x-ray.


 


Comparison is made to previous examinations dated 2020 and 2020.


 


Helical images were obtained through the abdomen and pelvis with no 


contrast administered. Reconstruction was performed in sagittal and 


coronal planes.


 


Exposure: One or more of the following individualized dose reduction 


techniques were utilized for this examination:  1. Automated exposure 


control  2. Adjustment of the mA and/or kV according to patient size  3. 


Use of iterative reconstruction technique.


 


There is a calcified granuloma in the left lung base. There is some linear


atelectasis in the left lower lobe and right middle lobe. The heart size 


appears be enlarged with no pericardial effusion evident.


 


The liver and spleen are both mildly enlarged but show no focal lesions. 


No abnormalities of seen at the adrenal glands, pancreas or gallbladder. 


The abdominal aorta shows some arteriosclerotic vascular calcification but


no aneurysmal dilatation. No abnormality seen at the inferior vena cava. 


There is no diverticulosis or diverticulitis or colitis. There is some 


residual contrast in the distal colon. There is no evidence of 


appendicitis. The small intestinal tract shows no abnormal dilatation, 


wall thickening or evidence of obstruction. The stomach is not distended 


and shows no wall thickening or obstruction. The kidneys show 3 hypodense 


lesions in the right kidney with the largest at the midpole measuring 


approximately 2.7 cm in greatest dimension. These are unchanged and likely


represent cysts. No renal calculi, hydronephrosis or obstructive uropathy 


is evident.


 


Bladder is well-distended and continues to show a bladder diverticulum on 


the right measuring approximately 4.3 x 4.2 x 2.5 cm in greatest 


dimensions. No abnormality seen at the prostate gland aside from 


prosthetic calcification. Seminal vesicles are symmetric. As the reference


calcifications are seen. There is a thoracolumbar scoliosis with thoracic 


convexly to the right and lumbar convexity to the left. There continue to 


be erosive changes at the inferior endplate of T11 and superior endplate 


of T12 on the left which show a mild increase in the bony destruction. 


This is associated with some soft tissue thickening in the paraspinous 


region which appears to be stable. There is a Schmorl's node in the 


inferior endplate of the L2 vertebral body which is unchanged. There are 


degenerative disc changes at the L1-2,, L2-3, L3-4, L4-5 and L5-S1 disc 


levels with vacuum disc phenomena


 


IMPRESSION:


 


Linear atelectasis at the left lower lobe and right middle lobe. 


Cardiomegaly. Mild hepatosplenomegaly. Hypodense lesions probably 


representing cysts in the right kidney which are stable. No new erosive 


changes at the inferior endplate of T11 and superior endplate of T12 on 


the left which appear to have increased and there continues be associated 


paraspinous soft tissue thickening which is unchanged. Thoracolumbar 


scoliosis with degenerative disc disease. Right-sided bladder diverticulum


measuring 4.3 cm in greatest dimension.


 


Electronically signed by: Kita Laguerre MD (2020 4:57 PM) [email or web address]














DICTATED and SIGNED BY:     KITA LAGUERRE MD


DATE:     20











 


[]





Course & Med Decision Making:


Course & Med Decision Making


Pertinent Labs and Imaging studies reviewed. (See chart for details)





Abdomen soft and nontender.  Bilateral lower extremities 3+ edema bilaterally.  

Lungs are clear in upper lobes diminished in lower lobes.  He states he has been

 constipated since Friday when he was discharged.  States he has plenty of pain 

medicines at home but nothing is working.  Patient denies nausea, vomiting, 

diarrhea, chest pain, shortness of air, fever, dysuria, headache, dizziness, 

LOC, vision changes, numbness or tingling, focal weakness. Patient is 

neurologically intact. Denies loss of bowel or bladder. Denies saddle paresthes

ia.











20 CT ABD PELV IMPRESSION:


 


 


1. No findings of bowel obstruction or perforation.


2. New erosive changes at the endplate at T11-T12 on the left with 


paraspinal soft tissue stranding is of concern for developing discitis 


osteomyelitis complex. Recommend correlation with clinical findings. MRI 


could be considered if clinically appropriate.


3. Mild urinary bladder wall thickening and moderate distention could 


reflect bladder outlet obstruction. Correlate for any clinical evidence of


cystitis.





I have spoken to infectious disease Aly Crook who saw the patient in the 

hospital when he was admitted.  Patient is on cefazolin currently.  I went over 

the results of his CT scan today with her and she states is probably just the 

progression of the disease process but he is on antibiotics.  Patient does have 

a appointment May 5, 2020.  She states to help the patient with his constipation

 which may be exacerbating some of his pain.  Patient will be given a enema in 

the ED and Senna. Patient to be sent home on Senna and he needs to call his 

primary care for follow up. 





Patient states he can not stand up, the pain is to great. He states "I dont feel

 gassy and I have not ate in 3 days, so there can not be stool in there" He 

states "I took laxatives". I then asked did you have a stool with those 

laxatives? He states " no I have not had a stool since Friday". Patient then 

states he needs a shot for her constipation. 





Patient stood up and stood at bed side without difficulty. I spoke to Dr Fraga 

who states from his stand point the patient can go home as he is stable and can 

call in the morning and get in to be dialyzed. Patient is given enema and Senna 

in the ED. 





1916: No success with Enema. I have spoken to Dr Underwood who states to give the 

patient Relistor. Patient states he has had this in the past and it worked for 

his constipation. 








Dragon Disclaimer:


Dragon Disclaimer:


This electronic medical record was generated, in whole or in part, using a voice

 recognition dictation system.





Departure


Departure


Impression:  


   Primary Impression:  


   Constipation


   Qualified Codes:  K59.00 - Constipation, unspecified


   Additional Impression:  


   Chronic abdominal pain


Disposition:   HOME, SELF-CARE


Condition:  STABLE


Referrals:  


KIESHA UNNO MD (PCP)


Patient Instructions:  Constipation, Adult, Easy-to-Read





Additional Instructions:  


Keep your appointment with infectious disease as scheduled.  Follow-up with your

 primary care provider concerning your constipation.  Take medication until you 

begin having a bowel movement. Drink plenty of fluids.


Scripts


Sennosides/Docusate Sodium (SENNA PLUS TABLET) 1 Each Tablet


2 TAB PO QHS for 14 Days, #28 TAB 0 Refills


   Prov: PHIL CARDONA         20











PHIL CARDONA            2020 13:55

## 2020-04-29 NOTE — RAD
Thoracic spine 3 views:

 

Reason for examination: Pain with history of osteomyelitis.

 

The vertebral bodies of the thoracic spine show a rotatory thoracolumbar 

scoliosis with the thoracic convexity to the right. There is poor 

mineralization. No definite site of fracture or subluxation is evident. No

gross abnormalities are seen at the pedicles or posterior elements. The 

intervertebral discs appear to be fairly well-maintained. 

 

IMPRESSION:

 

Thoracic scoliosis with convexity to the right. No acute abnormality 

apparent in the thoracic spine.

 

 

Lumbar spine 5 views:

 

There is a thoracolumbar cyst scoliosis with the lumbar convexity to the 

left. There is generalized demineralization. There appears to be some mild

depression of the superior endplate of the L1 vertebral body and superior 

and inferior endplates of the L2 vertebral body. Remaining vertebral 

bodies appear to be intact. No subluxation is apparent. The posterior 

elements appear to be intact. The intervertebral discs appear to be 

maintained. No gross abnormalities are seen at the visualized portion of 

the sacrum or sacroiliac joints.

 

IMPRESSION:

 

Lumbar scoliosis with convexity to the left. Mild depression of the 

superior endplate of L1 and superior and inferior endplates of L2.

 

Electronically signed by: Bernie eByer MD (4/29/2020 4:02 PM) [email or web address]

## 2020-04-29 NOTE — RAD
Examination: Acute abdomen series

 

HISTORY: History of left lower abdominal pain

 

COMPARISON: 4/12/2020

 

FINDINGS:

 

 

 

Low lung volumes and technique accentuates heart size and pulmonary 

vascularity. Moderate cardiomegaly. Prominent appearing bilateral 

interstitial lung markings likely chronic interstitial changes similar to 

prior exam. Large amount of stool identified throughout the colon. Air 

distended small bowel loops.

 

IMPRESSION:

 

1. Large amount of stool identified throughout the colon could be 

significant fecal impaction or constipation.Air distended small bowel 

loops. Consider follow-up CT.

 

Electronically signed by: Mane Guerin MD (4/29/2020 3:34 PM) [id number]

## 2020-05-02 ENCOUNTER — HOSPITAL ENCOUNTER (EMERGENCY)
Dept: HOSPITAL 61 - ER | Age: 70
Discharge: HOME | End: 2020-05-02
Payer: COMMERCIAL

## 2020-05-02 VITALS — SYSTOLIC BLOOD PRESSURE: 138 MMHG | DIASTOLIC BLOOD PRESSURE: 75 MMHG

## 2020-05-02 VITALS — HEIGHT: 72 IN | WEIGHT: 205.47 LBS | BODY MASS INDEX: 27.83 KG/M2

## 2020-05-02 DIAGNOSIS — G89.29: Primary | ICD-10-CM

## 2020-05-02 DIAGNOSIS — R11.0: ICD-10-CM

## 2020-05-02 DIAGNOSIS — I50.9: ICD-10-CM

## 2020-05-02 DIAGNOSIS — N18.9: ICD-10-CM

## 2020-05-02 DIAGNOSIS — Z95.1: ICD-10-CM

## 2020-05-02 DIAGNOSIS — E11.22: ICD-10-CM

## 2020-05-02 DIAGNOSIS — Z87.891: ICD-10-CM

## 2020-05-02 DIAGNOSIS — I25.10: ICD-10-CM

## 2020-05-02 DIAGNOSIS — J44.9: ICD-10-CM

## 2020-05-02 DIAGNOSIS — R10.84: ICD-10-CM

## 2020-05-02 DIAGNOSIS — I13.0: ICD-10-CM

## 2020-05-02 LAB
ALBUMIN SERPL-MCNC: 3.1 G/DL (ref 3.4–5)
ALBUMIN/GLOB SERPL: 0.6 {RATIO} (ref 1–1.7)
ALP SERPL-CCNC: 89 U/L (ref 46–116)
ALT SERPL-CCNC: < 6 U/L (ref 16–63)
ANION GAP SERPL CALC-SCNC: 6 MMOL/L (ref 6–14)
AST SERPL-CCNC: 13 U/L (ref 15–37)
BASOPHILS # BLD AUTO: 0.1 X10^3/UL (ref 0–0.2)
BASOPHILS NFR BLD: 1 % (ref 0–3)
BILIRUB SERPL-MCNC: 0.3 MG/DL (ref 0.2–1)
BUN SERPL-MCNC: 31 MG/DL (ref 8–26)
BUN/CREAT SERPL: 4 (ref 6–20)
CALCIUM SERPL-MCNC: 9.5 MG/DL (ref 8.5–10.1)
CHLORIDE SERPL-SCNC: 99 MMOL/L (ref 98–107)
CO2 SERPL-SCNC: 33 MMOL/L (ref 21–32)
CREAT SERPL-MCNC: 7.6 MG/DL (ref 0.7–1.3)
EOSINOPHIL NFR BLD: 0.2 X10^3/UL (ref 0–0.7)
EOSINOPHIL NFR BLD: 2 % (ref 0–3)
ERYTHROCYTE [DISTWIDTH] IN BLOOD BY AUTOMATED COUNT: 17.4 % (ref 11.5–14.5)
GFR SERPLBLD BASED ON 1.73 SQ M-ARVRAT: 8.6 ML/MIN
GLOBULIN SER-MCNC: 5.1 G/DL (ref 2.2–3.8)
GLUCOSE SERPL-MCNC: 128 MG/DL (ref 70–99)
HCT VFR BLD CALC: 35.3 % (ref 39–53)
HGB BLD-MCNC: 11.5 G/DL (ref 13–17.5)
LIPASE: 50 U/L (ref 73–393)
LYMPHOCYTES # BLD: 1.1 X10^3/UL (ref 1–4.8)
LYMPHOCYTES NFR BLD AUTO: 13 % (ref 24–48)
MAGNESIUM SERPL-MCNC: 2.8 MG/DL (ref 1.8–2.4)
MCH RBC QN AUTO: 34 PG (ref 25–35)
MCHC RBC AUTO-ENTMCNC: 33 G/DL (ref 31–37)
MCV RBC AUTO: 103 FL (ref 79–100)
MONO #: 0.9 X10^3/UL (ref 0–1.1)
MONOCYTES NFR BLD: 11 % (ref 0–9)
NEUT #: 6 X10^3/UL (ref 1.8–7.7)
NEUTROPHILS NFR BLD AUTO: 73 % (ref 31–73)
PHOSPHATE SERPL-MCNC: 3.8 MG/DL (ref 2.6–4.7)
PLATELET # BLD AUTO: 225 X10^3/UL (ref 140–400)
POTASSIUM SERPL-SCNC: 4.3 MMOL/L (ref 3.5–5.1)
PROT SERPL-MCNC: 8.2 G/DL (ref 6.4–8.2)
RBC # BLD AUTO: 3.43 X10^6/UL (ref 4.3–5.7)
SODIUM SERPL-SCNC: 138 MMOL/L (ref 136–145)
WBC # BLD AUTO: 8.2 X10^3/UL (ref 4–11)

## 2020-05-02 PROCEDURE — 80053 COMPREHEN METABOLIC PANEL: CPT

## 2020-05-02 PROCEDURE — 36415 COLL VENOUS BLD VENIPUNCTURE: CPT

## 2020-05-02 PROCEDURE — 93005 ELECTROCARDIOGRAM TRACING: CPT

## 2020-05-02 PROCEDURE — 83735 ASSAY OF MAGNESIUM: CPT

## 2020-05-02 PROCEDURE — 96375 TX/PRO/DX INJ NEW DRUG ADDON: CPT

## 2020-05-02 PROCEDURE — 83690 ASSAY OF LIPASE: CPT

## 2020-05-02 PROCEDURE — 85025 COMPLETE CBC W/AUTO DIFF WBC: CPT

## 2020-05-02 PROCEDURE — 84100 ASSAY OF PHOSPHORUS: CPT

## 2020-05-02 PROCEDURE — 99285 EMERGENCY DEPT VISIT HI MDM: CPT

## 2020-05-02 PROCEDURE — 74022 RADEX COMPL AQT ABD SERIES: CPT

## 2020-05-02 PROCEDURE — 96374 THER/PROPH/DIAG INJ IV PUSH: CPT

## 2020-05-02 NOTE — PHYS DOC
Past Medical History


Past Medical History:  CAD, CHF, COPD, Diabetes-Type II, Hypertension, Renal 

Disease, Renal Failure, Other


Additional Past Medical Histor:  Chronic Right neck pain,LEFT UPPER ARM FISTULA


Past Surgical History:  Coronary Bypass Surgery, Other


Additional Past Surgical Histo:  R middle and lower lobe lung removal, vein 

graft left thigh, SHUNT RT CHEST


Smoking Status:  Former Smoker


Alcohol Use:  Sober


Drug Use:  None





General Adult


EDM:


Chief Complaint:  ABDOMINAL PAIN





HPI:


HPI:





Patient is a 69  year old male who presents with complaint of diffuse abdominal 

pain that has been present for about the last 6 weeks but states the pain is 

gotten much worse over the last 3 days. Patient rates the pain as severe. He 

states that he has been nauseated but has not vomited. He did have a bowel 

movement earlier today after taking laxative. Patient states that he has a 

history of bowel obstruction and states that it feels like it did at that time. 

He rates his pain to be a 10 out of 10 currently. He states that nothing impr

oves the pain and movement and palpation worsens the pain.[]





Review of Systems:


Review of Systems:


Constitutional:  Denies fever or chills. []


Respiratory:  Denies cough or shortness of breath. [] 


Cardiovascular:  Denies chest pain. Patient reports chronic lower extremity 

edema that is unchanged. [] 


GI: Complains of abdominal pain with nausea. Denies vomiting or diarrhea. [] 


Neurologic:  Denies headache, focal weakness or sensory changes. [] 





A full 10 point review of systems has been reviewed and is otherwise negative 

except as noted in history of present illness.





Heart Score:


Risk Factors:


Risk Factors:  DM, Current or recent (<one month) smoker, HTN, HLP, family 

history of CAD, obesity.


Risk Scores:


Score 0 - 3:  2.5% MACE over next 6 weeks - Discharge Home


Score 4 - 6:  20.3% MACE over next 6 weeks - Admit for Clinical Observation


Score 7 - 10:  72.7% MACE over next 6 weeks - Early Invasive Strategies





Allergies:


Allergies:





Allergies








Coded Allergies Type Severity Reaction Last Updated Verified


 


  No Known Drug Allergies    4/22/20 No











Physical Exam:


PE:





Constitutional: Well developed, well nourished, no acute distress, non-toxic 

appearance. []


HENT: Normocephalic, atraumatic, bilateral external ears normal, oropharynx 

moist, no oral exudates, nose normal. []


Eyes: PERRLA, EOMI, conjunctiva normal, no discharge. [] 


Neck: Normal range of motion, no tenderness, supple, no stridor. [] 


Cardiovascular: Regular rate and rhythm[]


Lungs & Thorax:  Bilateral breath sounds clear to auscultation []


Abdomen: Bowel sounds diminished, soft, with diffuse tenderness. [] 


Skin: Warm, dry. [] 


Extremities: No cyanosis, no clubbing, ROM intact, with lower extremity edema. 

[] 


Neurologic: Alert and oriented X 3, no focal deficits noted. []





EKG:


EKG:


[]





Radiology/Procedures:


Radiology/Procedures:


[]


Impression:


PROCEDURE: ACUTE ABDOMEN SERIES





Abdomen series including PA chest 5/2/2020.


 


Reason for exam: [place] pain. Comparison is made with a study of 


4/29/2020.


 


No free air is seen. Gas is present within large and small bowel. There 


continues to be some colonic distention with gas, but there is less stool 


present. There are some air-fluid levels on the upright view, although the


gas pattern overall does not suggest obstruction. No abnormal masses or 


gas collections are seen.


 


A single view of the chest shows no new infiltrate. There are fewer 


markings on the right. There is no apparent pleural fluid. Heart size is 


unchanged.


 


IMPRESSION: Decreased stool. Persistent mild colonic distention without 


evidence of significant obstruction.


 


Electronically signed by: Madi Rhodes Jr., MD (5/2/2020 7:52 PM) 


[id number]














DICTATED and SIGNED BY:     MADI RHODES Jr, MD


DATE:     05/02/20 1952





Course & Med Decision Making:


Course & Med Decision Making


Pertinent Labs and Imaging studies reviewed. (See chart for details)





[]





Dragon Disclaimer:


Dragon Disclaimer:


This electronic medical record was generated, in whole or in part, using a voice

 recognition dictation system.





Departure


Departure


Impression:  


   Primary Impression:  


   Chronic abdominal pain


Disposition:  01 HOME, SELF-CARE


Condition:  STABLE


Referrals:  


KIESHA NUNO MD (PCP)


Patient Instructions:  Abdominal Pain, Chronic Pain


Scripts


Dicyclomine Hcl (DICYCLOMINE HCL) 20 Mg Tablet


1 TAB PO TID PRN for abdominal pain/cramping, #30 TAB


   Prov: BERNADINE ARTEAGA Jr. DO         5/2/20











BERNADINE ARTEAGA Jr. DO           May 2, 2020 19:29

## 2020-05-02 NOTE — RAD
Abdomen series including PA chest 5/2/2020.

 

Reason for exam: [place] pain. Comparison is made with a study of 

4/29/2020.

 

No free air is seen. Gas is present within large and small bowel. There 

continues to be some colonic distention with gas, but there is less stool 

present. There are some air-fluid levels on the upright view, although the

gas pattern overall does not suggest obstruction. No abnormal masses or 

gas collections are seen.

 

A single view of the chest shows no new infiltrate. There are fewer 

markings on the right. There is no apparent pleural fluid. Heart size is 

unchanged.

 

IMPRESSION: Decreased stool. Persistent mild colonic distention without 

evidence of significant obstruction.

 

Electronically signed by: Madi Brady Jr., MD (5/2/2020 7:52 PM) 

[id number]

## 2020-05-04 NOTE — EKG
[facility]

              [address] [place] [address]

Test Date:    2020               Test Time:    19:33:49

Pat Name:     RAFIQ MENDES             Department:   

Patient ID:   [id number]           Room:          

Gender:       M                        Technician:   

:          1950               Requested By: BERNADINE ARTEAGA

Order Number: [id number]           Reading MD:   Etienne Martinez

                                 Measurements

Intervals                              Axis          

Rate:         74                       P:            34

RI:           200                      QRS:          -12

QRSD:         124                      T:            -178

QT:           406                                    

QTc:          456                                    

                           Interpretive Statements

SINUS RHYTHM

LEFT ATRIAL ABNORMALITY

LEFTWARD AXIS

CONSIDER LEFT VENTRICULAR HYPERTROPHY

T ABNORMALITY IN ANTEROLATERAL LEADS

INFEROLATERAL LEADS

ABNORMAL ECG



Electronically Signed On 2020 8:49:58 CDT by Etienne Martinez

## 2020-05-11 ENCOUNTER — HOSPITAL ENCOUNTER (EMERGENCY)
Dept: HOSPITAL 61 - ER | Age: 70
Discharge: HOME | End: 2020-05-11
Payer: COMMERCIAL

## 2020-05-11 VITALS — DIASTOLIC BLOOD PRESSURE: 81 MMHG | SYSTOLIC BLOOD PRESSURE: 153 MMHG

## 2020-05-11 VITALS — HEIGHT: 71 IN | BODY MASS INDEX: 28.61 KG/M2 | WEIGHT: 204.37 LBS

## 2020-05-11 DIAGNOSIS — Z90.89: ICD-10-CM

## 2020-05-11 DIAGNOSIS — M54.9: ICD-10-CM

## 2020-05-11 DIAGNOSIS — E11.22: ICD-10-CM

## 2020-05-11 DIAGNOSIS — Z87.891: ICD-10-CM

## 2020-05-11 DIAGNOSIS — J44.9: ICD-10-CM

## 2020-05-11 DIAGNOSIS — G89.29: Primary | ICD-10-CM

## 2020-05-11 DIAGNOSIS — I13.0: ICD-10-CM

## 2020-05-11 DIAGNOSIS — N18.9: ICD-10-CM

## 2020-05-11 DIAGNOSIS — Z98.890: ICD-10-CM

## 2020-05-11 DIAGNOSIS — I50.9: ICD-10-CM

## 2020-05-11 PROCEDURE — 99284 EMERGENCY DEPT VISIT MOD MDM: CPT

## 2020-05-11 PROCEDURE — 96372 THER/PROPH/DIAG INJ SC/IM: CPT

## 2020-05-11 NOTE — PHYS DOC
Past Medical History


Past Medical History:  CAD, CHF, COPD, Diabetes-Type II, Hypertension, Renal 

Disease, Renal Failure, Other


Additional Past Medical Histor:  Chronic Right neck pain,LEFT UPPER ARM FISTULA


Past Surgical History:  Coronary Bypass Surgery, Other


Additional Past Surgical Histo:  R middle and lower lobe lung removal, vein 

graft left thigh, SHUNT RT CHEST


Smoking Status:  Former Smoker


Alcohol Use:  Sober


Drug Use:  None





General Adult


EDM:


Chief Complaint:  BACK PAIN - NO INJURY





HPI:


HPI:


Patient is a 69-year-old male with multiple medical problems including chronic 

back pain who presents with an exacerbation of his back pain today.  He states 

the Percocet that he is taking and has been taking for the last 13 years is not 

helping he is also been on a fentanyl patch and the pain seems to be escalating 

despite this.  He denies any fever chills or sweats.  He denies any other issues

besides an exacerbation of his chronic back pain.  []





Review of Systems:


Review of Systems:


Constitutional:  Denies fever or chills. []


Eyes:  Denies change in visual acuity. []


HENT:  Denies nasal congestion or sore throat. [] 


Respiratory:  Denies cough or shortness of breath. [] 


Cardiovascular:  Denies chest pain or edema. [] 


GI:  Denies abdominal pain, nausea, vomiting, bloody stools or diarrhea. [] 


:  Denies dysuria. [] 


Musculoskeletal: Per HPI [] 


Integument:  Denies rash. [] 


Neurologic:  Denies headache, focal weakness or sensory changes. [] 


Endocrine:  Denies polyuria or polydipsia. [] 


Lymphatic:  Denies swollen glands. [] 


Psychiatric:  Denies depression or anxiety. []





Heart Score:


Risk Factors:


Risk Factors:  DM, Current or recent (<one month) smoker, HTN, HLP, family 

history of CAD, obesity.


Risk Scores:


Score 0 - 3:  2.5% MACE over next 6 weeks - Discharge Home


Score 4 - 6:  20.3% MACE over next 6 weeks - Admit for Clinical Observation


Score 7 - 10:  72.7% MACE over next 6 weeks - Early Invasive Strategies





Current Medications:





Current Medications








 Medications


  (Trade)  Dose


 Ordered  Sig/Anna  Start Time


 Stop Time Status Last Admin


Dose Admin


 


 Ketorolac


 Tromethamine


  (Toradol Im)  60 mg  1X  ONCE  5/11/20 07:45


 5/11/20 07:46 UNV  





 


 Morphine Sulfate


  (Morphine


 Sulfate)  5 mg  1X  ONCE  5/11/20 07:45


 5/11/20 07:46 UNV  





 


 Orphenadrine


 Citrate


  (Norflex)  60 mg  1X  ONCE  5/11/20 07:45


 5/11/20 07:46 UNV  














Allergies:


Allergies:





Allergies








Coded Allergies Type Severity Reaction Last Updated Verified


 


  No Known Drug Allergies    4/22/20 No











Physical Exam:


PE:





Constitutional: Well developed, well nourished, n mild distress, non-toxic 

appearance. []


HENT: Normocephalic, atraumatic, bilateral external ears normal, oropharynx 

moist, no oral exudates, nose normal. []


Eyes: PERRLA, EOMI, conjunctiva normal, no discharge. [] 


Neck: Normal range of motion, no tenderness, supple, no stridor. [] 


Cardiovascular:Heart rate regular rhythm, no murmur []


Lungs & Thorax:  Bilateral breath sounds clear to auscultation []


Abdomen: Surgical wounds that have been well-healed. [] 


Skin: Has a fentanyl patch in place h. [] 


Back: No tenderness, no CVA tenderness. [] 


Extremities: No tenderness, no cyanosis, no clubbing, ROM intact, no edema. [] 


Neurologic: Alert and oriented X 3, normal motor function, normal sensory 

function, no focal deficits noted. []


Psychologic: Anxious. []





EKG:


EKG:


[]





Radiology/Procedures:


Radiology/Procedures:


[]





Course & Med Decision Making:


Course & Med Decision Making


Pertinent Labs and Imaging studies reviewed. (See chart for details)





[]





Dragon Disclaimer:


Dragon Disclaimer:


This electronic medical record was generated, in whole or in part, using a voice

 recognition dictation system.





Departure


Departure


Impression:  


   Primary Impression:  


   Exacerbation of chronic back pain


Disposition:  01 HOME, SELF-CARE


Condition:  STABLE


Referrals:  


KIESHA NUNO MD (PCP)


Patient Instructions:  Chronic Back Pain





Additional Instructions:  


Follow with Dr. Watts as we discussed.











QUIANA RASMUSSEN DO             May 11, 2020 07:42

## 2020-05-20 ENCOUNTER — HOSPITAL ENCOUNTER (EMERGENCY)
Dept: HOSPITAL 61 - ER | Age: 70
Discharge: HOME | End: 2020-05-20
Payer: COMMERCIAL

## 2020-05-20 VITALS — SYSTOLIC BLOOD PRESSURE: 161 MMHG | DIASTOLIC BLOOD PRESSURE: 84 MMHG

## 2020-05-20 VITALS — BODY MASS INDEX: 27.07 KG/M2 | WEIGHT: 193.35 LBS | HEIGHT: 71 IN

## 2020-05-20 DIAGNOSIS — I50.9: ICD-10-CM

## 2020-05-20 DIAGNOSIS — Z98.890: ICD-10-CM

## 2020-05-20 DIAGNOSIS — I13.2: ICD-10-CM

## 2020-05-20 DIAGNOSIS — G89.29: ICD-10-CM

## 2020-05-20 DIAGNOSIS — M54.41: ICD-10-CM

## 2020-05-20 DIAGNOSIS — Z99.2: ICD-10-CM

## 2020-05-20 DIAGNOSIS — E11.22: ICD-10-CM

## 2020-05-20 DIAGNOSIS — M54.42: ICD-10-CM

## 2020-05-20 DIAGNOSIS — Z87.891: ICD-10-CM

## 2020-05-20 DIAGNOSIS — N18.6: Primary | ICD-10-CM

## 2020-05-20 PROCEDURE — 99283 EMERGENCY DEPT VISIT LOW MDM: CPT

## 2020-05-20 NOTE — PHYS DOC
Past Medical History


Past Medical History:  CAD, CHF, COPD, Diabetes-Type II, Hypertension, Renal 

Disease, Renal Failure, Other


Additional Past Medical Histor:  Chronic Right neck pain,LEFT UPPER ARM FISTULA


Past Surgical History:  Coronary Bypass Surgery, Other


Additional Past Surgical Histo:  R middle and lower lobe lung removal, vein 

graft left thigh, SHUNT RT CHEST


Smoking Status:  Former Smoker


Alcohol Use:  Sober


Drug Use:  None





General Adult


EDM:


Chief Complaint:  BACK PAIN OR INJURY





HPI:


HPI:





Patient is a 69  year old male with a history of diabetes type 2, hypertension, 

chronic low back pain, CAD, CHF, end-stage kidney disease on dialysis Monday Wednesday Friday last dialyzed today who presents to the ED today complaining of

10 out of 10 chronic low back pain.  Patient denies any trauma.  Denies any pain

radiating to bilateral lower extremities.  Denies any loss of bowel bladder 

function.  He states he ran out of his oxycodone which he had received from Dr. Watts the pain clinic doctor on May 13, 2020.





Review of Systems:


Review of Systems:


Constitutional:  Denies fever or chills. []


Musculoskeletal: Reports low back pain


Integument:  Denies rash. [] 


Neurologic:  Denies headache, focal weakness or sensory changes. [] 


Psychiatric:  Denies depression or anxiety. []





Heart Score:


Risk Factors:


Risk Factors:  DM, Current or recent (<one month) smoker, HTN, HLP, family 

history of CAD, obesity.


Risk Scores:


Score 0 - 3:  2.5% MACE over next 6 weeks - Discharge Home


Score 4 - 6:  20.3% MACE over next 6 weeks - Admit for Clinical Observation


Score 7 - 10:  72.7% MACE over next 6 weeks - Early Invasive Strategies





Current Medications:





Current Medications








 Medications


  (Trade)  Dose


 Ordered  Sig/[facility]  Start Time


 Stop Time Status Last Admin


Dose Admin


 


 Oxycodone HCl


  (Roxicodone)  20 mg  1X  ONCE  5/20/20 18:00


 5/20/20 18:05 DC 5/20/20 18:14


20 MG











Allergies:


Allergies:





Allergies








Coded Allergies Type Severity Reaction Last Updated Verified


 


  No Known Drug Allergies    4/22/20 No











Physical Exam:


PE:





Constitutional: Well developed, well nourished, no acute distress, non-toxic 

appearance. []


Abdomen: Bowel sounds normal, soft, no tenderness, no masses, no pulsatile 

masses. [] 


Skin: Warm, dry, no erythema, no rash.  Left upper extremity with a dialysis 

fistula.  Positive thrill and bruit.


Back: No tenderness, no CVA tenderness. [] 


Extremities: No tenderness, no cyanosis, no clubbing, ROM intact, no edema. [] 


Neurologic: Alert and oriented X 3, normal motor function, normal sensory 

function, no focal deficits noted. []


Psychologic: Affect normal, judgement normal, mood normal. []





Current Patient Data:


Vital Signs:





                                   Vital Signs








  Date Time  Temp Pulse Resp B/P (MAP) Pulse Ox O2 Delivery O2 Flow Rate FiO2


 


5/20/20 18:14   20  96 Room Air  


 


5/20/20 17:42 99.8 73  144/74 (97)    





 99.8       











EKG:


EKG:


[]





Radiology/Procedures:


Radiology/Procedures:


[]





Course & Med Decision Making:


Course & Med Decision Making


Pertinent Labs and Imaging studies reviewed. (See chart for details)





This is a 69-year-old male patient well-known to this ED presenting today for 

exacerbation of chronic low back pain.  He ran out of his oxycodone.  He 

promises to follow-up with the PCP in the course of this week.  Patient was 

noted to run a slight temperature of 99.8.  He refused to be worked up for this 

stating he wants a prescription for oxycodone and will see his PCP in the course

 of this week.





Dragon Disclaimer:


Dragon Disclaimer:


This electronic medical record was generated, in whole or in part, using a voice

 recognition dictation system.





Departure


Departure


Impression:  


   Primary Impression:  


   ESRD (end stage renal disease) on dialysis


   Additional Impression:  


   Back pain


   Qualified Codes:  M54.42 - Lumbago with sciatica, left side; M54.41 - Lumbago

    with sciatica, right side; G89.29 - Other chronic pain


Disposition:  01 HOME, SELF-CARE


Condition:  STABLE


Referrals:  


KIESHA NUNO MD (PCP)


follow up in the course of this week





SAEED WATTS MD


follow up in the course of this week


Patient Instructions:  Back Pain, Adult, Easy-to-Read





Additional Instructions:  


You were seen for back pain.  Ensure you follow-up with your primary care doctor

 as well as the pain clinic in the course of this week.


Scripts


Oxycodone HCl (Oxycontin) 10 Mg Tab.er.12h


1 TAB PO BID for pain MDD 2 Tablet(s), #14 TAB 0 Refills


   Prov: HATTIE PRATER         5/20/20











HATTIE PRATER              May 20, 2020 18:33

## 2020-06-07 ENCOUNTER — HOSPITAL ENCOUNTER (EMERGENCY)
Dept: HOSPITAL 61 - ER | Age: 70
Discharge: HOME | End: 2020-06-07
Payer: COMMERCIAL

## 2020-06-07 VITALS — BODY MASS INDEX: 27.78 KG/M2 | HEIGHT: 71 IN | WEIGHT: 198.42 LBS

## 2020-06-07 VITALS — SYSTOLIC BLOOD PRESSURE: 171 MMHG | DIASTOLIC BLOOD PRESSURE: 88 MMHG

## 2020-06-07 DIAGNOSIS — I25.10: ICD-10-CM

## 2020-06-07 DIAGNOSIS — I50.9: ICD-10-CM

## 2020-06-07 DIAGNOSIS — Z87.891: ICD-10-CM

## 2020-06-07 DIAGNOSIS — E11.22: ICD-10-CM

## 2020-06-07 DIAGNOSIS — G89.29: Primary | ICD-10-CM

## 2020-06-07 DIAGNOSIS — M54.6: ICD-10-CM

## 2020-06-07 DIAGNOSIS — I13.2: ICD-10-CM

## 2020-06-07 DIAGNOSIS — J44.9: ICD-10-CM

## 2020-06-07 DIAGNOSIS — N18.6: ICD-10-CM

## 2020-06-07 DIAGNOSIS — Z99.2: ICD-10-CM

## 2020-06-07 PROCEDURE — 99284 EMERGENCY DEPT VISIT MOD MDM: CPT

## 2020-06-07 PROCEDURE — 96372 THER/PROPH/DIAG INJ SC/IM: CPT

## 2020-06-07 NOTE — PHYS DOC
Past Medical History


Past Medical History:  CAD, CHF, COPD, Diabetes-Type II, Hypertension, Renal 

Disease, Renal Failure, Other


Additional Past Medical Histor:  Chronic Right neck pain,LEFT UPPER ARM FISTULA


Past Surgical History:  Coronary Bypass Surgery, Other


Additional Past Surgical Histo:  R middle and lower lobe lung removal, vein 

graft left thigh, SHUNT RT CHEST


Smoking Status:  Former Smoker


Alcohol Use:  Sober


Drug Use:  None





General Adult


EDM:


Chief Complaint:  BACK PAIN - NO INJURY





HPI:


HPI:





Patient is a 69  year old male who presented to ER today for evaluation of mid 

back pain, out of pain medication.  Patient said he was admitted here on May 31,

was just discharged yesterday.  Patient went home, assumed that he was 

discharged with pain medication but there was none.  Patient having severe back 

pain throughout the night, so he decided come back here for pain control.  

Patient denies any bowel or bladder incontinence.  Patient had no chest pain or 

trouble breathing.  Patient had end-stage renal failure, he is scheduled for 

hemodialysis tomorrow morning.  Patient said his family doctor is the one who 

administered pain.  Patient said he needs some pain medication to last him for 

few days and he will call his family doctor tomorrow so he can see her.  Patient

was discharged yesterday with the diagnosis of Back pain-   T11-12 

diskitis/osteomyelitis, and paraspinal infection.  There is no recent injury.  

Patient has history hypertension, he has not taken his blood pressure 

medication.





Review of Systems:


Review of Systems:


Constitutional:  Denies fever or chills. []


Eyes:  Denies change in visual acuity. []


HENT:  Denies nasal congestion or sore throat. [] 


Respiratory:  Denies cough or shortness of breath. [] 


Cardiovascular:  Denies chest pain or edema. [] 


GI:  Denies abdominal pain, nausea, vomiting, bloody stools or diarrhea. [] 


:  Denies dysuria. [] 


Musculoskeletal: Positive for back pain


Integument:  Denies rash. [] 


Neurologic:  Denies headache, focal weakness or sensory changes. [] 


Endocrine:  Denies polyuria or polydipsia. [] 


Lymphatic:  Denies swollen glands. [] 


Psychiatric:  Denies depression or anxiety. []





Heart Score:


Risk Factors:


Risk Factors:  DM, Current or recent (<one month) smoker, HTN, HLP, family 

history of CAD, obesity.


Risk Scores:


Score 0 - 3:  2.5% MACE over next 6 weeks - Discharge Home


Score 4 - 6:  20.3% MACE over next 6 weeks - Admit for Clinical Observation


Score 7 - 10:  72.7% MACE over next 6 weeks - Early Invasive Strategies





Current Medications:





Current Medications








 Medications


  (Trade)  Dose


 Ordered  Sig/Anna  Start Time


 Stop Time Status Last Admin


Dose Admin


 


 Morphine Sulfate


  (Morphine


 Sulfate)  5 mg  1X  ONCE  6/7/20 07:30


 6/7/20 07:31   





 


 Oxycodone HCl


  (OxyCONTIN)  10 mg  1X  ONCE  6/7/20 07:30


 6/7/20 07:31   














Allergies:


Allergies:





Allergies








Coded Allergies Type Severity Reaction Last Updated Verified


 


  No Known Drug Allergies    4/22/20 No











Physical Exam:


PE:





Constitutional: Well developed, well nourished, no acute distress, non-toxic ap

pearance. []


HENT: Normocephalic, atraumatic, bilateral external ears normal, oropharynx 

moist, no oral exudates, nose normal. []


Eyes: PERRLA, EOMI, conjunctiva normal, no discharge. [] 


Neck: Normal range of motion, no tenderness, supple, no stridor. [] 


Cardiovascular:Heart rate regular rhythm, no murmur []


Lungs & Thorax:  Bilateral breath sounds clear to auscultation []


Abdomen: Bowel sounds normal, soft, no tenderness, no masses, no pulsatile 

masses. [] 


Skin: Warm, dry, no erythema, no rash. [] 


Back: There is tenderness to palpation in mid back area from T10-L1.  No bony 

deformity,  no CVA tenderness. [] 


Extremities: No tenderness, no cyanosis, no clubbing, ROM intact, no edema. [] 

AV fistula on the left arm good strong thrill


Neurologic: Alert and oriented X 3, normal motor function, normal sensory 

function, no focal deficits noted. []


Psychologic: Affect normal, judgement normal, mood normal. []





Current Patient Data:


Vital Signs:





                                   Vital Signs








  Date Time  Temp Pulse Resp B/P (MAP) Pulse Ox O2 Delivery O2 Flow Rate FiO2


 


6/7/20 06:36 97.3 83 18 171/88 (115) 96 Room Air  





 97.3       











EKG:


EKG:


[]





Course & Med Decision Making:


Course & Med Decision Making


Pertinent Labs and Imaging studies reviewed. (See chart for details)





Patient is a 69-year-old male with chronic back pain, who was just discharged 

from here yesterday due to Back pain-   T11-12 diskitis/osteomyelitis, and 

paraspinal infection.  He is under the care of infectious disease, he has end-

stage renal failure, on hemodialysis, is scheduled for dialysis tomorrow 

morning.  His family doctor who is managing his pain problem.   He came in to ER

 due to back pain, had no pain medication at home.  Patient was given pain 

medication in the ER, he will be discharged home with his pain medication for 3 

to 4 days, he will need to follow-up with family doctor for pain medication and 

pain management.





Dragon Disclaimer:


Dragon Disclaimer:


This electronic medical record was generated, in whole or in part, using a voice

 recognition dictation system.





Departure


Departure


Impression:  


   Primary Impression:  


   Chronic back pain


Disposition:  01 HOME, SELF-CARE


Condition:  IMPROVED


Referrals:  


KIESHA NUNO MD (PCP)


please follow up with your doctor tomorrow.


Patient Instructions:  Chronic Back Pain





Additional Instructions:  


Thank you for visiting our Emergency Department. We appreciate you trusting us 

with your care. If any additional problems come up don't hesitate to return to 

visit us. Please follow up with your primary care provider so they can plan 

additional care if needed and know about the problem that you had. If symptoms 

worsen come back to the Emergency Department. Any concerning symptoms that start

 such as chest pain, shortness of air, weakness or numbness on one side of the 

body, running high fevers or any other concerning symptoms return to the ER.


Scripts


Oxycodone Hcl (OXYCONTIN **) 10 Mg Tab.er.12h


1 TAB PO BID PRN for back pain MDD 2 Tablet(s), #8 TAB 0 Refills


   Prov: JAIME KENYON DO         6/7/20





Justicifation of Admission Dx:


Justifications for Admission:


Justification of Admission Dx:  N/A











JAIME KENYON DO                 Jun 7, 2020 07:06

## 2020-06-09 ENCOUNTER — HOSPITAL ENCOUNTER (EMERGENCY)
Dept: HOSPITAL 61 - ER | Age: 70
Discharge: HOME | End: 2020-06-09
Payer: COMMERCIAL

## 2020-06-09 VITALS — HEIGHT: 71 IN | WEIGHT: 198.42 LBS | BODY MASS INDEX: 27.78 KG/M2

## 2020-06-09 VITALS — SYSTOLIC BLOOD PRESSURE: 167 MMHG | DIASTOLIC BLOOD PRESSURE: 76 MMHG

## 2020-06-09 DIAGNOSIS — E11.22: ICD-10-CM

## 2020-06-09 DIAGNOSIS — Z95.1: ICD-10-CM

## 2020-06-09 DIAGNOSIS — J44.9: ICD-10-CM

## 2020-06-09 DIAGNOSIS — I25.10: ICD-10-CM

## 2020-06-09 DIAGNOSIS — I13.0: ICD-10-CM

## 2020-06-09 DIAGNOSIS — Z87.891: ICD-10-CM

## 2020-06-09 DIAGNOSIS — I50.9: ICD-10-CM

## 2020-06-09 DIAGNOSIS — G89.29: ICD-10-CM

## 2020-06-09 DIAGNOSIS — N18.9: ICD-10-CM

## 2020-06-09 DIAGNOSIS — K59.00: Primary | ICD-10-CM

## 2020-06-09 PROCEDURE — 74022 RADEX COMPL AQT ABD SERIES: CPT

## 2020-06-09 PROCEDURE — 96372 THER/PROPH/DIAG INJ SC/IM: CPT

## 2020-06-09 PROCEDURE — 99283 EMERGENCY DEPT VISIT LOW MDM: CPT

## 2020-06-09 NOTE — RAD
Examination: ACUTE ABDOMEN SERIES

 

History: Reason: constipation / Spl. Instructions:  / History: 

 

Comparison/Correlation: None

 

Findings: Supine and upright views of the abdomen were obtained. Frontal 

view the chest was provided. Sternal wires are noted. Suture material at 

the medial right infrahilar level is noted.

 

No infiltrates. No pneumothorax. No definite effusion.

 

Large quantity of stool is present throughout the colon. Fluid levels 

within nondistended bowel evident. No suspicious abdominal calcifications.

Scoliosis of the thoracolumbar spine noted.

 

 

Impression:

Moderate quantity of stool in the colon. No obstruction or extraluminal 

gas.

 

No infiltrate.

 

Electronically signed by: Kali Fermin MD (6/9/2020 9:30 AM) [id number]

## 2020-06-09 NOTE — PHYS DOC
Past Medical History


Past Medical History:  CAD, CHF, COPD, Diabetes-Type II, Hypertension, Renal 

Disease, Renal Failure, Other


Additional Past Medical Histor:  Chronic Right neck pain,LEFT UPPER ARM FISTULA


Past Surgical History:  Coronary Bypass Surgery, Other


Additional Past Surgical Histo:  R middle and lower lobe lung removal, vein 

graft left thigh, SHUNT RT CHEST


Smoking Status:  Former Smoker


Alcohol Use:  Sober


Drug Use:  None





General Adult


EDM:


Chief Complaint:  ABDOMINAL PAIN





HPI:


HPI:





Patient is a 69  year old who presented to the ER due to constipation.  He said 

he has not have bowel movement for several days.  He is scheduled to see his GI 

doctor tomorrow.  No nausea or vomiting, no fever. No chest pain, no shortness 

of air. He is chronically on narcotic for back pain.





Review of Systems:


Review of Systems:


Constitutional:  Denies fever or chills. []


Eyes:  Denies change in visual acuity. []


HENT:  Denies nasal congestion or sore throat. [] 


Respiratory:  Denies cough or shortness of breath. [] 


Cardiovascular:  Denies chest pain or edema. [] 


GI:  Denies abdominal pain, nausea, vomiting, bloody stools or diarrhea. 

POSITIVE FOR CONSTIPATION


:  Denies dysuria. [] 


Musculoskeletal:  Denies back pain or joint pain. [] 


Integument:  Denies rash. [] 


Neurologic:  Denies headache, focal weakness or sensory changes. [] 


Endocrine:  Denies polyuria or polydipsia. [] 


Lymphatic:  Denies swollen glands. [] 


Psychiatric:  Denies depression or anxiety. []





Heart Score:


Risk Factors:


Risk Factors:  DM, Current or recent (<one month) smoker, HTN, HLP, family 

history of CAD, obesity.


Risk Scores:


Score 0 - 3:  2.5% MACE over next 6 weeks - Discharge Home


Score 4 - 6:  20.3% MACE over next 6 weeks - Admit for Clinical Observation


Score 7 - 10:  72.7% MACE over next 6 weeks - Early Invasive Strategies





Current Medications:





Current Medications








 Medications


  (Trade)  Dose


 Ordered  Sig/Anan  Start Time


 Stop Time Status Last Admin


Dose Admin


 


 Methylnaltrexone


 Bromide


  (Relistor)  12 mg  1X  ONCE  20 09:15


 20 09:16 DC  














Allergies:


Allergies:





Allergies








Coded Allergies Type Severity Reaction Last Updated Verified


 


  No Known Drug Allergies    4/22/20 No











Physical Exam:


PE:





Constitutional: Well developed, well nourished, no acute distress, non-toxic 

appearance. []


HENT: Normocephalic, atraumatic, bilateral external ears normal, oropharynx 

moist, no oral exudates, nose normal. []


Eyes: PERRLA, EOMI, conjunctiva normal, no discharge. [] 


Neck: Normal range of motion, no tenderness, supple, no stridor. [] 


Cardiovascular:Heart rate regular rhythm, no murmur []


Lungs & Thorax:  Bilateral breath sounds clear to auscultation []


Abdomen: Bowel sounds normal, soft, no tenderness, no masses, no pulsatile 

masses. [] 


Skin: Warm, dry, no erythema, no rash. [] 


Back: No tenderness, no CVA tenderness. [] 


Extremities: No tenderness, no cyanosis, no clubbing, ROM intact, no edema. [] 


Neurologic: Alert and oriented X 3, normal motor function, normal sensory 

function, no focal deficits noted. []


Psychologic: Affect normal, judgement normal, mood normal. []





Current Patient Data:


Vital Signs:





                                   Vital Signs








  Date Time  Temp Pulse Resp B/P (MAP) Pulse Ox O2 Delivery O2 Flow Rate FiO2


 


20 08:41 98.4 83 16 162/78 (106) 99 Room Air  





 98.4       











EKG:


EKG:


[]





Radiology/Procedures:


Radiology/Procedures:


[][facility]


                    [address] [place] [address]


                                 [phone]


                                        


                                 IMAGING REPORT





                                     Signed





PATIENT: RAFIQ MENDES   ACCOUNT: [id number]     MRN#: [id number]


: 1950           LOCATION: ER              AGE: 69


SEX: M                    EXAM DT: 20         ACCESSION#: [id number]


STATUS: REG ER            ORD. PHYSICIAN: KENYON,PETER T DO


REASON: constipation


PROCEDURE: ACUTE ABDOMEN SERIES





Examination: ACUTE ABDOMEN SERIES


 


History: Reason: constipation / Spl. Instructions:  / History: 


 


Comparison/Correlation: None


 


Findings: Supine and upright views of the abdomen were obtained. Frontal 


view the chest was provided. Sternal wires are noted. Suture material at 


the medial right infrahilar level is noted.


 


No infiltrates. No pneumothorax. No definite effusion.


 


Large quantity of stool is present throughout the colon. Fluid levels 


within nondistended bowel evident. No suspicious abdominal calcifications.


Scoliosis of the thoracolumbar spine noted.


 


 


Impression:


Moderate quantity of stool in the colon. No obstruction or extraluminal 


gas.


 


No infiltrate.


 


Electronically signed by: Kali Delgado MD (2020 9:30 AM) [id number]














DICTATED and SIGNED BY:     KALI DELGADO MD


DATE:     20





Course & Med Decision Making:


Course & Med Decision Making


Pertinent Labs and Imaging studies reviewed. (See chart for details)





[]





Dragon Disclaimer:


Dragon Disclaimer:


This electronic medical record was generated, in whole or in part, using a voice

 recognition dictation system.





Departure


Departure


Impression:  


   Primary Impression:  


   Constipation


Disposition:   ADMITTED AS INPATIENT


Condition:  IMPROVED


Referrals:  


KIESHA NUNO MD (PCP)


please follow up with your doctor as scheduled


Patient Instructions:  Constipation, Adult





Justicifation of Admission Dx:


Justifications for Admission:


Justification of Admission Dx:  N/A











JAIME KENYON DO                 2020 09:39

## 2020-06-11 ENCOUNTER — HOSPITAL ENCOUNTER (EMERGENCY)
Dept: HOSPITAL 61 - ER | Age: 70
Discharge: HOME | End: 2020-06-11
Payer: COMMERCIAL

## 2020-06-11 VITALS — DIASTOLIC BLOOD PRESSURE: 87 MMHG | SYSTOLIC BLOOD PRESSURE: 166 MMHG

## 2020-06-11 VITALS — HEIGHT: 71 IN | BODY MASS INDEX: 27.47 KG/M2 | WEIGHT: 196.21 LBS

## 2020-06-11 DIAGNOSIS — E11.22: ICD-10-CM

## 2020-06-11 DIAGNOSIS — I50.9: ICD-10-CM

## 2020-06-11 DIAGNOSIS — J44.9: ICD-10-CM

## 2020-06-11 DIAGNOSIS — Z87.891: ICD-10-CM

## 2020-06-11 DIAGNOSIS — N18.9: ICD-10-CM

## 2020-06-11 DIAGNOSIS — I13.0: ICD-10-CM

## 2020-06-11 DIAGNOSIS — Z98.890: ICD-10-CM

## 2020-06-11 DIAGNOSIS — M54.5: ICD-10-CM

## 2020-06-11 DIAGNOSIS — G89.29: Primary | ICD-10-CM

## 2020-06-11 PROCEDURE — 99284 EMERGENCY DEPT VISIT MOD MDM: CPT

## 2020-06-11 PROCEDURE — 96372 THER/PROPH/DIAG INJ SC/IM: CPT

## 2020-06-11 NOTE — PHYS DOC
Past Medical History


Past Medical History:  CAD, CHF, COPD, Diabetes-Type II, Hypertension, Renal 

Disease, Renal Failure, Other


Additional Past Medical Histor:  Chronic Right neck pain,LEFT UPPER ARM FISTULA


Past Surgical History:  Coronary Bypass Surgery, Other


Additional Past Surgical Histo:  R middle and lower lobe lung removal, vein 

graft left thigh, SHUNT RT CHEST


Smoking Status:  Former Smoker


Alcohol Use:  Sober


Drug Use:  None





General Adult


EDM:


Chief Complaint:  BACK PAIN OR INJURY





HPI:


HPI:





Patient is a 69 year old male presents with report of back pain that has 

progressively worsened over the last few days.  Reports history of chronic back 

pain.  Reports history of using oxycodone for pain but reports he doesn't like 

how it makes him feel.  Denies loss of bowel/bladder. Denies dysuria or 

hematuria.  Denies trauma.  Denies rash.  Denies fever/chills.





Review of Systems:


Review of Systems:





Constitutional: Denies fever or chills 


Eyes: Denies redness or eye pain 


HENT: Denies nasal congestion or sore throat


Respiratory: Denies cough or shortness of breath 


Cardiovascular: Denies chest pain or palpitations


GI: Denies abdominal pain, nausea, or vomiting


: Denies dysuria or hematuria


Musculoskeletal: Reports back pain; denies neck pain


Integument: Denies rash or skin lesions 


Neurologic: Denies headache, focal weakness or sensory changes; denies loss of 

bowel/bladder





Complete systems were reviewed and found to be within normal limits, except as 

documented in this note.





Allergies:


Allergies:





Allergies








Coded Allergies Type Severity Reaction Last Updated Verified


 


  No Known Drug Allergies    4/22/20 No











Physical Exam:


PE:





Constitutional: Well developed, well nourished, no acute distress, non-toxic 

appearance


HENT: Normocephalic, atraumatic


Eyes: Conjunctiva normal, no discharge


Neck: Normal range of motion, no tenderness, supple


Lungs & Thorax:  No respiratory distress, equal chest rise and fall


Abdomen: Soft, no tenderness, no guarding/rebound tenderness/distention


Skin: Warm, dry, no erythema, no rash


Back: No midline tenderness, upper lumbar paraspinal tenderness noted, no CVA 

tenderness


Extremities: No tenderness, ROM intact, no edema


Neurologic: Alert and oriented X 3, normal motor function, normal sensory 

function, no focal deficits noted


Psychologic: Affect normal, judgment normal





Current Patient Data:


Vital Signs:





                                   Vital Signs








  Date Time  Temp Pulse Resp B/P (MAP) Pulse Ox O2 Delivery O2 Flow Rate FiO2


 


6/11/20 14:15 97.9 72 20 164/77 (106) 96 Room Air  





 97.9       











EKG:


EKG:


[]





Radiology/Procedures:


Radiology/Procedures:


[]





Course & Med Decision Making:


Course & Med Decision Making





Patient presents with HPI and physical exam consistent for exacerbation of 

chronic low back pain.  Pain addressed.





Patient stable for discharge with outpatient follow-up with PCP/pain management.

  Pain management referral provided. Discussed findings and plan with patient, 

who acknowledges understanding and agreement.





Dragon Disclaimer:


Dragon Disclaimer:


This electronic medical record was generated, in whole or in part, using a voice

 recognition dictation system.





Departure


Departure


Impression:  


   Primary Impression:  


   Acute exacerbation of chronic low back pain


Disposition:  01 HOME, SELF-CARE


Condition:  STABLE


Referrals:  


KIESHA NUNO MD (PCP)








SAEED CASTANEDA MD


Patient Instructions:  Chronic Back Pain


Scripts


Lidocaine (Lidocaine PATCH  **) 1 Each Adh..patch


1 EACH TP DAILY for FOR LOCAL PAIN, #14 PATCH


   REMOVE AFTER 12 HOURS THEN LEAVE OFF FOR 12 HOURS PRIOR TO


   PLACEMENT OF NEW PATCH


   Prov: POONAM GALICIA DO         6/11/20 


Orphenadrine Citrate (ORPHENADRINE CITRATE) 100 Mg Tablet.er


100 MG PO BID PRN for MUSCLE PAIN, #20 TAB


   Prov: POONAM GALICIA DO         6/11/20





Justicifation of Admission Dx:


Justifications for Admission:


Justification of Admission Dx:  N/A











POONAM GALICIA DO             Jun 11, 2020 15:09

## 2020-06-30 ENCOUNTER — HOSPITAL ENCOUNTER (OUTPATIENT)
Dept: HOSPITAL 61 - ER | Age: 70
Setting detail: OBSERVATION
LOS: 1 days | Discharge: HOME | End: 2020-07-01
Attending: INTERNAL MEDICINE | Admitting: INTERNAL MEDICINE
Payer: COMMERCIAL

## 2020-06-30 VITALS — DIASTOLIC BLOOD PRESSURE: 66 MMHG | SYSTOLIC BLOOD PRESSURE: 111 MMHG

## 2020-06-30 VITALS — DIASTOLIC BLOOD PRESSURE: 70 MMHG | SYSTOLIC BLOOD PRESSURE: 106 MMHG

## 2020-06-30 VITALS — BODY MASS INDEX: 30.06 KG/M2 | HEIGHT: 71 IN | WEIGHT: 214.73 LBS

## 2020-06-30 VITALS — SYSTOLIC BLOOD PRESSURE: 147 MMHG | DIASTOLIC BLOOD PRESSURE: 82 MMHG

## 2020-06-30 DIAGNOSIS — I13.2: ICD-10-CM

## 2020-06-30 DIAGNOSIS — Z95.1: ICD-10-CM

## 2020-06-30 DIAGNOSIS — G45.9: Primary | ICD-10-CM

## 2020-06-30 DIAGNOSIS — I42.9: ICD-10-CM

## 2020-06-30 DIAGNOSIS — Z96.611: ICD-10-CM

## 2020-06-30 DIAGNOSIS — N18.6: ICD-10-CM

## 2020-06-30 DIAGNOSIS — Z99.2: ICD-10-CM

## 2020-06-30 DIAGNOSIS — I25.10: ICD-10-CM

## 2020-06-30 DIAGNOSIS — E11.22: ICD-10-CM

## 2020-06-30 DIAGNOSIS — Z87.891: ICD-10-CM

## 2020-06-30 DIAGNOSIS — I50.43: ICD-10-CM

## 2020-06-30 DIAGNOSIS — J44.9: ICD-10-CM

## 2020-06-30 DIAGNOSIS — E78.5: ICD-10-CM

## 2020-06-30 LAB
ALBUMIN SERPL-MCNC: 3 G/DL (ref 3.4–5)
ALBUMIN/GLOB SERPL: 0.7 {RATIO} (ref 1–1.7)
ALP SERPL-CCNC: 85 U/L (ref 46–116)
ALT SERPL-CCNC: < 6 U/L (ref 16–63)
ANION GAP SERPL CALC-SCNC: 9 MMOL/L (ref 6–14)
APTT BLD: 38 SEC (ref 24–38)
AST SERPL-CCNC: 14 U/L (ref 15–37)
BASOPHILS # BLD AUTO: 0 X10^3/UL (ref 0–0.2)
BASOPHILS NFR BLD: 1 % (ref 0–3)
BILIRUB SERPL-MCNC: 0.3 MG/DL (ref 0.2–1)
BUN SERPL-MCNC: 32 MG/DL (ref 8–26)
BUN/CREAT SERPL: 5 (ref 6–20)
CALCIUM SERPL-MCNC: 8.7 MG/DL (ref 8.5–10.1)
CHLORIDE SERPL-SCNC: 101 MMOL/L (ref 98–107)
CK SERPL-CCNC: 46 U/L (ref 39–308)
CK SERPL-CCNC: 57 U/L (ref 39–308)
CO2 SERPL-SCNC: 30 MMOL/L (ref 21–32)
CREAT SERPL-MCNC: 6 MG/DL (ref 0.7–1.3)
EOSINOPHIL NFR BLD: 0.1 X10^3/UL (ref 0–0.7)
EOSINOPHIL NFR BLD: 3 % (ref 0–3)
ERYTHROCYTE [DISTWIDTH] IN BLOOD BY AUTOMATED COUNT: 18 % (ref 11.5–14.5)
GFR SERPLBLD BASED ON 1.73 SQ M-ARVRAT: 11.3 ML/MIN
GLOBULIN SER-MCNC: 4.5 G/DL (ref 2.2–3.8)
GLUCOSE SERPL-MCNC: 111 MG/DL (ref 70–99)
HCT VFR BLD CALC: 29 % (ref 39–53)
HGB BLD-MCNC: 9.7 G/DL (ref 13–17.5)
LYMPHOCYTES # BLD: 1 X10^3/UL (ref 1–4.8)
LYMPHOCYTES NFR BLD AUTO: 19 % (ref 24–48)
MAGNESIUM SERPL-MCNC: 2.4 MG/DL (ref 1.8–2.4)
MCH RBC QN AUTO: 34 PG (ref 25–35)
MCHC RBC AUTO-ENTMCNC: 34 G/DL (ref 31–37)
MCV RBC AUTO: 103 FL (ref 79–100)
MONO #: 0.6 X10^3/UL (ref 0–1.1)
MONOCYTES NFR BLD: 12 % (ref 0–9)
NEUT #: 3.4 X10^3/UL (ref 1.8–7.7)
NEUTROPHILS NFR BLD AUTO: 66 % (ref 31–73)
PLATELET # BLD AUTO: 184 X10^3/UL (ref 140–400)
POTASSIUM SERPL-SCNC: 4.7 MMOL/L (ref 3.5–5.1)
PROT SERPL-MCNC: 7.5 G/DL (ref 6.4–8.2)
PROTHROMBIN TIME: 14.7 SEC (ref 11.7–14)
RBC # BLD AUTO: 2.83 X10^6/UL (ref 4.3–5.7)
SODIUM SERPL-SCNC: 140 MMOL/L (ref 136–145)
WBC # BLD AUTO: 5.1 X10^3/UL (ref 4–11)

## 2020-06-30 PROCEDURE — 85610 PROTHROMBIN TIME: CPT

## 2020-06-30 PROCEDURE — 36415 COLL VENOUS BLD VENIPUNCTURE: CPT

## 2020-06-30 PROCEDURE — 82553 CREATINE MB FRACTION: CPT

## 2020-06-30 PROCEDURE — G0379 DIRECT REFER HOSPITAL OBSERV: HCPCS

## 2020-06-30 PROCEDURE — 93005 ELECTROCARDIOGRAM TRACING: CPT

## 2020-06-30 PROCEDURE — 82962 GLUCOSE BLOOD TEST: CPT

## 2020-06-30 PROCEDURE — 85730 THROMBOPLASTIN TIME PARTIAL: CPT

## 2020-06-30 PROCEDURE — 85025 COMPLETE CBC W/AUTO DIFF WBC: CPT

## 2020-06-30 PROCEDURE — 70450 CT HEAD/BRAIN W/O DYE: CPT

## 2020-06-30 PROCEDURE — 96360 HYDRATION IV INFUSION INIT: CPT

## 2020-06-30 PROCEDURE — 83880 ASSAY OF NATRIURETIC PEPTIDE: CPT

## 2020-06-30 PROCEDURE — 83735 ASSAY OF MAGNESIUM: CPT

## 2020-06-30 PROCEDURE — 71045 X-RAY EXAM CHEST 1 VIEW: CPT

## 2020-06-30 PROCEDURE — 84484 ASSAY OF TROPONIN QUANT: CPT

## 2020-06-30 PROCEDURE — 80053 COMPREHEN METABOLIC PANEL: CPT

## 2020-06-30 PROCEDURE — 82550 ASSAY OF CK (CPK): CPT

## 2020-06-30 PROCEDURE — G0378 HOSPITAL OBSERVATION PER HR: HCPCS

## 2020-06-30 PROCEDURE — 99285 EMERGENCY DEPT VISIT HI MDM: CPT

## 2020-06-30 RX ADMIN — Medication SCH CAP: at 22:00

## 2020-06-30 RX ADMIN — DICYCLOMINE HYDROCHLORIDE PRN MG: 10 CAPSULE ORAL at 22:00

## 2020-06-30 RX ADMIN — SEVELAMER CARBONATE SCH MG: 800 TABLET, FILM COATED ORAL at 22:05

## 2020-06-30 RX ADMIN — LUBIPROSTONE SCH MCG: 24 CAPSULE, GELATIN COATED ORAL at 18:00

## 2020-06-30 RX ADMIN — OXYCODONE HYDROCHLORIDE PRN MG: 10 TABLET, FILM COATED, EXTENDED RELEASE ORAL at 22:00

## 2020-06-30 NOTE — EKG
[facility]

              [address] [place] [address]

Test Date:    2020               Test Time:    11:20:04

Pat Name:     RAFIQ MENDES             Department:   

Patient ID:   [id number]           Room:          

Gender:                               Technician:   

:          1950               Requested By: CHRISTIANNE MULLER

Order Number: [id number]           Reading MD:     

                                 Measurements

Intervals                              Axis          

Rate:         59                       P:            15

SD:           192                      QRS:          -21

QRSD:         106                      T:            -174

QT:           462                                    

QTc:          462                                    

                           Interpretive Statements

SINUS RHYTHM

VENTRICULAR PREMATURE COMPLEX(ES)

LEFT ATRIAL ABNORMALITY

LEFTWARD AXIS

R-S TRANSITION ZONE IN V LEADS DISPLACED TO THE LEFT

T ABNORMALITY IN LATERAL LEADS

INFEROLATERAL LEADS

ABNORMAL ECG

RI6.01

No previous ECG available for comparison

## 2020-06-30 NOTE — NUR
Pgd and spoke to primary to receive admission orders.  Order consults to neuro and renal and 
resumed home meds.

## 2020-06-30 NOTE — RAD
Examination: CT HEAD WO CONTRAST

 

History: Reason: altered LOC, weakness / Spl. Instructions:  / History: 

 

Comparison/Correlation: None

 

Findings: Axial images of the head were obtained without contrast. Atrophy

is present. No intracranial hemorrhage, midline shift, or mass effect. 

Right basal ganglia lacunar infarct is present of indeterminate age but 

old in appearance. No intracranial hemorrhage, shift, or mass effect. Old 

right medial orbital wall fracture is present. Minimal opacification of 

the left sphenoid sinus is present. Mucosal wall thickening in the right 

maxillary sinus partially seen. Cavernous carotid calcification is 

notable.

 

 

Impression:

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 No intracranial 

hemorrhage.

 

Electronically signed by: Kali Fermin MD (6/30/2020 12:04 PM) [id number]

## 2020-06-30 NOTE — HP
ADMIT DATE:  06/30/2020



CHIEF COMPLAINT:  Lightheaded and dizziness.



HISTORY OF PRESENT ILLNESS:  The patient is a pleasant 69-year-old male who

presented to the ER with the above chief complaints.  Basically, he has been

dizzy and lightheaded.  He is a dialysis patient.  He has been a little confused

this morning when he awoke.  Clinically it appears he probably had a TIA.  I

discussed the case with ER physician.  We are going to admit the patient and

consult Neurology and Nephrology.



PAST MEDICAL HISTORY:  Coronary artery disease, diabetes, hyperlipidemia,

hypertension, end-stage renal disease, on dialysis, left upper arm fistula,

coronary bypass surgery, right middle and lower lobe lung removal, vein graft,

shunt to the right chest, previous tobacco abuse.



ALLERGIES:  None.



FAMILY HISTORY:  Chronic obstructive pulmonary disease.



SOCIAL HISTORY:  He quit smoking, no drinking or drugs.



MEDICATIONS:  Reviewed, please refer to the MRAD.



REVIEW OF SYSTEMS:

GENERAL:  No history of weight change, weakness or fevers.

SKIN:  No bruising, hair changes or rashes.

EYES:  No blurred, double or loss of vision.

NOSE AND THROAT:  No history of nosebleeds, hoarseness or sore throat.

HEART:  No history of palpitations, chest pain or shortness of breath on

exertion.

LUNGS:  Denies cough, hemoptysis, wheezing or shortness of breath.

GASTROINTESTINAL:  Denies changes in appetite, nausea, vomiting, diarrhea or

constipation.

GENITOURINARY:  No history of frequency, urgency, hesitancy or nocturia.

NEUROLOGIC:   He complains of dizziness.

PSYCHIATRIC:  No history of panic, anxiety or depression.

ENDOCRINE:  No history of heat or cold intolerance, polyuria or polydipsia.

EXTREMITIES:  Denies muscle weakness, joint pain, pain on walking or stiffness.



PHYSICAL EXAMINATION:

VITALS:  Within normal limits and are stable.

GENERAL:  No apparent distress.  Alert and oriented.

HEENT:  Normal cephalic atraumatic, external auditory canals are patent.

EYES:  Extraocular muscles are intact, pupils are equally round and reactive to

light and accommodation.

MUSCULOSKELETAL:  Well developed, well nourished, good range of motion.

ENDOCRINE:  No thyromegaly was palpated.

LYMPHATICS:  No cervical chain or axillary nodes were noted.

HEMATOPOIETIC:  No bruising.

NECK:  Supple, no JVD, no thyromegaly was noted.

LUNGS:  He has bibasilar crackles.

HEART:  RRR, S1, S2 present.  Peripheral pulses intact, no obvious murmurs were

noted.

ABDOMEN:  Soft, nontender.  Positive bowel sounds no organomegaly, normal bowel

sounds.

EXTREMITIES:  Without any cyanosis, clubbing, or edema.  Pedal pulses intact,

Homans sign is negative.

NEUROLOGIC:  Normal speech, normal tone.  A & O x3, moves all extremities, no

obvious focal deficits.

PSYCHIATRIC:  Normal affect, normal mood.  Stable.

SKIN:  No ulcerations or rashes, good skin turgor, no jaundice.

VASCULAR:  Good capillary refill, neurovascular bundle appears to be intact.



LABORATORY DATA:  Hemoglobin is 9.7, BUN is 32, creatinine 6, glucose 100.  BNP

6701.  Troponin is 0.  Chest x-ray shows no suspicious process.  CT of the head

shows a right basal ganglia stroke, which is probably old.



ASSESSMENT AND PLAN:  Stroke symptoms and elevated BNP with probable

acute-on-chronic systolic and diastolic heart failure.  The patient has been

admitted.  We will consult Nephrology and Cardiology and Neurology.  Home

medications, deep vein thrombosis prophylaxis.  Full code.  Cardiac monitoring.



PROGNOSIS:  Guarded.

 



______________________________

CYNTHIA KAPOOR DO



DR:  RYLAND/yuniel  JOB#:  [id number] / [id number]

DD:  06/30/2020 19:43  DT:  06/30/2020 20:17

## 2020-06-30 NOTE — PHYS DOC
Past Medical History


Past Medical History:  CAD, CHF, COPD, Diabetes-Type II, Hypertension, Renal 

Disease, Renal Failure, Other


Additional Past Medical Histor:  Chronic Right neck pain,LEFT UPPER ARM FISTULA


Past Surgical History:  Coronary Bypass Surgery, Other


Additional Past Surgical Histo:  R middle and lower lobe lung removal, vein 

graft left thigh, SHUNT RT CHEST


Smoking Status:  Former Smoker


Alcohol Use:  None


Drug Use:  None





General Adult


EDM:


Chief Complaint:  DIZZY/LIGHT HEADED





HPI:


HPI:





Patient is a 69  year old AA male who presents the emergency department via 

private vehicle with complaints of having blurred vision and feeling dizzy, 

weak, and confused since awakening this morning.  Patient states when he woke up

he initially thought that it was Saturday evening.  He reports that he is so 

weak he is unable to walk.  Patient reports that his dizziness is worse with 

position changes.  Patient reports that he is on dialysis and that he went to 

dialysis yesterday.  He denies any complications with dialysis yesterday.  The 

patient denies any slurred speech, headache, neck pain, chest pain,, 

palpitations, nausea, vomiting, diarrhea, shortness of breath, fever, numbness, 

or tingling.  He reports a history of chronic abdominal pain in the left side of

his abdomen, he denies any changes in his abdominal pain and he currently rates 

it a 10 out of 10 on the pain scale, he denies any alleviating or exacerbating 

factors.  He denies any radiation of the pain





Review of Systems:


Review of Systems:


Constitutional:   Denies fever or chills. []


Eyes:   See HPI


HENT:   Denies nasal congestion or sore throat. [] 


Respiratory:   Denies cough or shortness of breath. [] 


Cardiovascular:   Denies chest pain or edema. [] 


GI:   See HPI


: Patient reports that he still makes a small amount of urine.  He denies 

urinary symptoms


Musculoskeletal:   Denies back pain or joint pain. [] 


Integument:   Denies rash. [] 


Neurologic:   See HPI


Psychiatric:  Denies depression or anxiety. []





Heart Score:


Risk Factors:


Risk Factors:  DM, Current or recent (<one month) smoker, HTN, HLP, family 

history of CAD, obesity.


Risk Scores:


Score 0 - 3:  2.5% MACE over next 6 weeks - Discharge Home


Score 4 - 6:  20.3% MACE over next 6 weeks - Admit for Clinical Observation


Score 7 - 10:  72.7% MACE over next 6 weeks - Early Invasive Strategies





Allergies:


Allergies:





Allergies








Coded Allergies Type Severity Reaction Last Updated Verified


 


  No Known Drug Allergies    20 No











Physical Exam:


PE:





Constitutional: Well developed, well nourished, no acute distress, non-toxic 

appearance. []


HENT: Normocephalic, atraumatic, bilateral external ears normal, nose normal. []


Eyes: PERRLA, EOMI, conjunctiva normal, no discharge, no nystagmus. [] 


Neck: Normal range of motion, no stridor. [] 


Cardiovascular:Heart rate regular rhythm, no murmur []


Lungs & Thorax:  Bilateral breath sounds clear to auscultation, Respirations zach

n and unlabored, no retractions, no respiratory distress []


Skin: Warm, dry, no erythema, no rash. [] 


Extremities: No cyanosis, no clubbing, ROM intact, 3+ edema BLE without weeping 

or drainage


Neurologic: Alert and oriented X 3, normal motor function, normal sensory 

function, no focal deficits noted. []


Psychologic: Affect normal, judgement normal, mood normal. []





Current Patient Data:


Labs:





                                Laboratory Tests








Test


 20


11:24


 


White Blood Count


 5.1 x10^3/uL


(4.0-11.0)


 


Red Blood Count


 2.83 x10^6/uL


(4.30-5.70)  L


 


Hemoglobin


 9.7 g/dL


(13.0-17.5)  L


 


Hematocrit


 29.0 %


(39.0-53.0)  L


 


Mean Corpuscular Volume


 103 fL


()  H


 


Mean Corpuscular Hemoglobin 34 pg (25-35)  


 


Mean Corpuscular Hemoglobin


Concent 34 g/dL


(31-37)


 


Red Cell Distribution Width


 18.0 %


(11.5-14.5)  H


 


Platelet Count


 184 x10^3/uL


(140-400)


 


Neutrophils (%) (Auto) 66 % (31-73)  


 


Lymphocytes (%) (Auto) 19 % (24-48)  L


 


Monocytes (%) (Auto) 12 % (0-9)  H


 


Eosinophils (%) (Auto) 3 % (0-3)  


 


Basophils (%) (Auto) 1 % (0-3)  


 


Neutrophils # (Auto)


 3.4 x10^3/uL


(1.8-7.7)


 


Lymphocytes # (Auto)


 1.0 x10^3/uL


(1.0-4.8)


 


Monocytes # (Auto)


 0.6 x10^3/uL


(0.0-1.1)


 


Eosinophils # (Auto)


 0.1 x10^3/uL


(0.0-0.7)


 


Basophils # (Auto)


 0.0 x10^3/uL


(0.0-0.2)


 


Sodium Level


 140 mmol/L


(136-145)


 


Potassium Level


 4.7 mmol/L


(3.5-5.1)


 


Chloride Level


 101 mmol/L


()


 


Carbon Dioxide Level


 30 mmol/L


(21-32)


 


Anion Gap 9 (6-14)  


 


Blood Urea Nitrogen


 32 mg/dL


(8-26)  H


 


Creatinine


 6.0 mg/dL


(0.7-1.3)  H


 


Estimated GFR


(Cockcroft-Gault) 11.3  





 


BUN/Creatinine Ratio 5 (6-20)  L


 


Glucose Level


 111 mg/dL


(70-99)  H


 


Calcium Level


 8.7 mg/dL


(8.5-10.1)


 


Magnesium Level


 2.4 mg/dL


(1.8-2.4)


 


Total Bilirubin


 0.3 mg/dL


(0.2-1.0)


 


Aspartate Amino Transferase


(AST) 14 U/L (15-37)


L


 


Alanine Aminotransferase (ALT)


 < 6 U/L


(16-63)  L


 


Alkaline Phosphatase


 85 U/L


()


 


Creatine Kinase


 57 U/L


()


 


Creatine Kinase MB (Mass)


 < 0.5 ng/mL


(0.0-3.6)


 


Creatine Kinase MB Relative


Index  % (0-4)  





 


Troponin I Quantitative


 < 0.017 ng/mL


(0.000-0.055)


 


NT-Pro-B-Type Natriuretic


Peptide 6701 pg/mL


(0-124)  H


 


Total Protein


 7.5 g/dL


(6.4-8.2)


 


Albumin


 3.0 g/dL


(3.4-5.0)  L


 


Albumin/Globulin Ratio


 0.7 (1.0-1.7)


L





                                Laboratory Tests


20 11:24








                                Laboratory Tests


20 11:24








Vital Signs:





                                   Vital Signs








  Date Time  Temp Pulse Resp B/P (MAP) Pulse Ox O2 Delivery O2 Flow Rate FiO2


 


6/30/20 11:15 98.4 59 24 96/58 (71) 95 Room Air  





 98.4       











EKG:


EK-sinus rhythm rate of 59, PVCs present with left atrial abnormality, no 

STEMI read by Dr. Evans.  []





Radiology/Procedures:


Radiology/Procedures:


PROCEDURE: PORTABLE CHEST 1V





Examination: PORTABLE CHEST 1V


 


History: Reason: hypoxia / Spl. Instructions:  / History: 


 


Comparison/Correlation: None


 


Findings: Portable upright frontal view chest was obtained. Sternal wires 


and mediastinal clips present. Heart size is slightly enlarged. No 


pneumothorax. Vasculature is within upper limits of normal. No infiltrate 


or pleural effusion. No acute bony process.


 


Impression:


No suspicious process..





PROCEDURE: CT HEAD WO CONTRAST





Examination: CT HEAD WO CONTRAST


 


History: Reason: altered LOC, weakness / Spl. Instructions:  / History: 


 


Comparison/Correlation: None


 


Findings: Axial images of the head were obtained without contrast. Atrophy


is present. No intracranial hemorrhage, midline shift, or mass effect. 


Right basal ganglia lacunar infarct is present of indeterminate age but 


old in appearance. No intracranial hemorrhage, shift, or mass effect. Old 


right medial orbital wall fracture is present. Minimal opacification of 


the left sphenoid sinus is present. Mucosal wall thickening in the right 


maxillary sinus partially seen. Cavernous carotid calcification is 


notable.


 


 


Impression:


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 No intracranial 


hemorrhage.


 


 []





Course & Med Decision Making:


Course & Med Decision Making


Pertinent Labs and Imaging studies reviewed. (See chart for details)


69-year-old male who presents to the emergency department with complaints of 

dizziness and generalized weakness that began upon awakening this morning.


Orthostatic blood pressures are negative.


Work-up included CT head, CBC, CMP, troponin, CK panel, magnesium, PT/INRs, BNP,

 and a portable chest x-ray.


CT head and chest x-ray were negative for any acute findings.


Patient CBC showed decreased hemoglobin hematocrit, hemoglobin was 9.7, 

hematocrit 29.0, likely chronic; PT/INR revealed a PT of 14.7 INR 1.2; CMP 

revealed changes consistent with chronic renal failure, BUN 32, creatinine 6.0, 

BNP 6701, negative CK index and negative troponin less than 0.017.


Unable to collect a urine sample in the emergency department.


The patient's blood pressure remained on the low side in the emergency 

department 90s over 50s consistently there was a reading that was 80s over 50s. 

 He was given a 500 mL bolus of normal saline.


NIH stroke scale score was a 0, low suspicion for CVA, most likely this is a TIA


Patient reported complete resolution of his symptoms while in the emergency 

department.





[id number] spoke with Dr. Rios who is the admitting physician, and care was 

assumed following discussion of patient.  Will admit the patient for a TIA and 

chronic renal failure to med telemetry.


Patient's vital signs stable. Patient remains afebrile, appears nontoxic, 

respirations even and unlabored. Patient will be admitted to the tele floor. 

Patient's case and plan of care also discussed with Dr. Evans


[]





Aishwarya Disclaimer:


Dragon Disclaimer:


This electronic medical record was generated, in whole or in part, using a voice

 recognition dictation system.





Departure


Departure


Impression:  


   Primary Impression:  


   TIA (transient ischemic attack)


   Additional Impression:  


   CRF (chronic renal failure)


   Qualified Codes:  N18.9 - Chronic kidney disease, unspecified


Disposition:   ADMITTED AS INPATIENT


Admitting Physician:  HIMS (Olar)


Condition:  STABLE


Referrals:  


KIESHA NUNO MD (PCP)





Justicifation of Admission Dx:


Justifications for Admission:


Justification of Admission Dx:  Yes


Comments:


TIA





NIHSS Stroke Scale


NIH Stroke Scale:  








NIH Stroke Scale Response (Comments) Value


 


Level of Consciousness:                 0 Alert/Responsive 0


 


LOC Questions:                          0 Answers both correctly 0


 


LOC Commands:                           0 Performs both tasks 0


 


Best Gaze:                              0 Normal 0


 


Visual:                                 0 No visual loss 0


 


Facial Palsy:                           0 Normal, symmetrical 0


 


Motor - Left Arm                        0 No drift 0


 


Motor - Right Arm                       0 No drift 0


 


Motor - Left Leg                        0 No drift 0


 


Motor: Right Leg                        0 No drift 0


 


Limb Ataxia:                            0 Absent 0


 


Sensory:                                0 No loss 0


 


Best Language:                          0 Normal 0


 


Dysathria:                              0 Normal 0


 


Extinction and Inattention:             0 Normal 0


 


Total  0

















CHRISTIANNE MULLER       2020 12:23

## 2020-07-01 VITALS — SYSTOLIC BLOOD PRESSURE: 132 MMHG | DIASTOLIC BLOOD PRESSURE: 53 MMHG

## 2020-07-01 VITALS — SYSTOLIC BLOOD PRESSURE: 141 MMHG | DIASTOLIC BLOOD PRESSURE: 78 MMHG

## 2020-07-01 VITALS — DIASTOLIC BLOOD PRESSURE: 72 MMHG | SYSTOLIC BLOOD PRESSURE: 137 MMHG

## 2020-07-01 RX ADMIN — Medication SCH CAP: at 08:46

## 2020-07-01 RX ADMIN — SEVELAMER CARBONATE SCH MG: 800 TABLET, FILM COATED ORAL at 08:46

## 2020-07-01 RX ADMIN — OXYCODONE HYDROCHLORIDE PRN MG: 10 TABLET, FILM COATED, EXTENDED RELEASE ORAL at 14:40

## 2020-07-01 RX ADMIN — LUBIPROSTONE SCH MCG: 24 CAPSULE, GELATIN COATED ORAL at 08:46

## 2020-07-01 RX ADMIN — SEVELAMER CARBONATE SCH MG: 800 TABLET, FILM COATED ORAL at 14:39

## 2020-07-01 RX ADMIN — DICYCLOMINE HYDROCHLORIDE PRN MG: 10 CAPSULE ORAL at 08:46

## 2020-07-01 NOTE — NUR
Discharge Note:



RAFIQ MENDES



Discharge instructions and discharge home medications reviewed with Patient and a copy 
given. All questions have been answered and understanding verbalized. 



The following instructions and handouts were given: Dehydration and TIA. 



Discontinued iv line and catheter intact.



Patient discharged to home with self-care via private vehicle.

## 2020-07-01 NOTE — PDOC2
CONSULT


Date of Consult


Date of Consult


DATE: 7/1/20 


TIME: 09:15





Reason for Consult


Reason for Consult:


ESRD





Source


Source:  Chart review, Patient





History of Present Illness


Reason for Visit:





Patient is a 69  year old AA male who presents the emergency departmen with 

complaints of having blurred vision and feeling dizzy, weak, and confused yest 

morning  Patient states when he woke up he initially thought that it was 

Saturday evening.  He reports that he was so weak he is unable to walk.  Patient

reports that his dizziness was worse with position changes.  


Patient reports that he had  dialysis on Monday  with no issues   denies any 

slurred speech, headache, neck pain, chest pain,, palpitations, nausea, 

vomiting, diarrhea, shortness of breath, fever, numbness, or tingling.  He 

reports a history of chronic abdominal pain in the left side of his abdomen, he 

denies any changes in his abdominal pain and he currently rates it a 10 out of 

10 on the pain scale, he denies any alleviating or exacerbating factors.  He 

denies any radiation of the pain


 Earlier this am  he is sitting up in chair and states he is ready to go home  


Seen him again on HD - No complaints





Past Medical History


Cardiovascular:  CAD, CHF, HTN


Pulmonary:  COPD, Other


CENTRAL NERVOUS SYSTEM:  Other


GI:  GERD, GI bleed, Gastritis


Heme/Onc:  No pertinent hx


Hepatobiliary:  No pertinent hx


Psych:  No pertinent hx


Musculoskeletal:  Osteoarthritis


Rheumatologic:  No pertinent hx


Infectious disease:  Other


Renal/:  Chronic renal failure, Benign prostatic enlarg.


Endocrine:  Diabetes





Past Surgical History


Past Surgical History:  CABG





Family History


Family History:  Diabetes





Social History


Quit


ALCOHOL:  none


Drugs:  None


Lives:  with Family


Domestic Violence:  Neg





Current Problem List


Problem List


Problems


Medical Problems:


(1) CRF (chronic renal failure)


Status: Acute  





(2) TIA (transient ischemic attack)


Status: Acute  











Current Medications


Current Medications





Current Medications


Sodium Chloride 500 ml @  500 mls/hr 1X  ONCE IV  Last administered on 6/30/20at

13:07;  Start 6/30/20 at 13:15;  Stop 6/30/20 at 14:14;  Status DC


Alprazolam (Xanax) 0.5 mg PRN BID  PRN PO ANXIETY / AGITATION;  Start 6/30/20 at

17:15


Aspirin (Ecotrin) 81 mg DAILY PO  Last administered on 7/1/20at 08:46;  Start 

7/1/20 at 09:00


Bisacodyl (Dulcolax Tab) 10 mg PRN DAILY  PRN PO CONSTIPATION (1st Choice);  

Start 6/30/20 at 17:15


Docusate Sodium (Colace) 100 mg DAILY PO  Last administered on 7/1/20at 08:46;  

Start 7/1/20 at 09:00


Vitamin B Complex/ Vitamin C (Winsome-Samir) 1 tab DAILY PO  Last administered on 

7/1/20at 08:46;  Start 7/1/20 at 09:00


Gabapentin (Neurontin) 300 mg QHS PO  Last administered on 6/30/20at 22:00;  

Start 6/30/20 at 21:00


Lactobacillus Rhamnosus (Culturelle) 1 cap BID PO  Last administered on 7/1/20at

08:46;  Start 6/30/20 at 21:00


Lidocaine (Lidoderm) 1 patch DAILY TP  Last administered on 7/1/20at 08:45;  

Start 7/1/20 at 09:00


Lubiprostone (Amitiza) 24 mcg BIDWMEALS PO  Last administered on 7/1/20at 08:46;

 Start 6/30/20 at 18:00


Oxycodone HCl (OxyCONTIN) 10 mg PRN BID  PRN PO back pain Last administered on 

6/30/20at 22:00;  Start 6/30/20 at 17:15


Pantoprazole Sodium (Protonix) 40 mg DAILYAC PO  Last administered on 7/1/20at 

08:47;  Start 7/1/20 at 07:30


Polyethylene Glycol (miraLAX PACKET) 17 gm PRN DAILY  PRN PO CONSTIPATION Last 

administered on 7/1/20at 08:47;  Start 6/30/20 at 17:15


Senna/Docusate Sodium (Senna Plus) 2 tab QHS PO  Last administered on 6/30/20at 

22:02;  Start 6/30/20 at 21:00


Sevelamer Carbonate (Renvela) 400 mg TID PO  Last administered on 7/1/20at 

08:46;  Start 6/30/20 at 21:00


Simvastatin (Zocor) 10 mg QHS PO  Last administered on 6/30/20at 22:01;  Start 

6/30/20 at 21:00


Vitamin A/Vitamin D (Vitamin A & D Ointment) 1 cira PRN Q1HR  PRN TP SKIN 

PROTECTION Last administered on 7/1/20at 08:46;  Start 6/30/20 at 17:15


Dicyclomine HCl (Bentyl) 20 mg TID PRN  PRN PO ABDOMINAL PAIN/CRAMPING Last 

administered on 7/1/20at 08:46;  Start 6/30/20 at 17:45





Active Scripts


Active


Lidocaine PATCH  ** (Lidocaine) 1 Each Adh..patch 1 Each TP DAILY


     REMOVE AFTER 12 HOURS THEN LEAVE OFF FOR 12 HOURS PRIOR TO


     PLACEMENT OF NEW PATCH


Oxycontin ** (Oxycodone HCl) 10 Mg Tab.er.12h 1 Tab PO BID PRN MDD 2 Tablet(s)


A and D Ointment (Vits A and D/White Pet/Lanolin) 42.5 Gm Oint...g. 1 Cira TP PRN

Q1HR PRN 14 Days


Culturelle (Lactobacillus Rhamnosus Gg) 1 Each Cap.sprink 1 Cap PO BID 30 Days


Pantoprazole Sodium  ** (Pantoprazole Sodium) 40 Mg Tablet.dr 40 Mg PO DAILYAC 

30 Days


Amitiza (Lubiprostone) 24 Mcg Capsule 24 Mcg PO BIDWMEALS 30 Days


Bisacodyl 5 Mg Tablet.dr 10 Mg PO PRN DAILY PRN 30 Days


Gabapentin 300 Mg Capsule 300 Mg PO TID 30 Days


Dicyclomine Hcl 20 Mg Tablet 1 Tab PO TID PRN


Senna Plus Tablet (Sennosides/Docusate Sodium) 1 Each Tablet 2 Tab PO QHS 14 

Days


Colace (Docusate Sodium) 100 Mg Capsule 100 Mg PO DAILY


Polyethylene Glycol 3350 17 Gm Powd.pack 17 Gm PO PRN DAILY PRN


Reported


Alprazolam 0.5 Mg Tablet 0.5 Mg PO PRN BID PRN


Simvastatin 10 Mg Tablet 1 Tab PO QHS


Aspir 81 (Aspirin) 81 Mg Tablet.dr 1 Tab PO DAILY


Nephro-Samir Tablet (Folic Acid/Vitamin B Comp W-C) 0.8 Mg Tablet 1 Tab PO DAILY


Renvela (Sevelamer Carbonate) 800 Mg Tablet 0.5 Tab PO TID





Allergies


Allergies:  


Coded Allergies:  


     No Known Drug Allergies (Unverified , 4/22/20)





ROS


Review of System


   Per HPI





Physical Exam


Physical Exam


GENERAL:  not in distress.


HEENT:  NAD.


NECK:  Supple, 


LUNGS:  Clear.


HEART:  S1, S2 regular.murmur +


ABDOMEN:  Benign. incisional abdominal hernia+


EXTREMITIES:  No edema or cyanosis.    AV graft in the left upper extremity.


SKIN: No rash  


NEUROLOGIC:   Grossly normal


 No Altman, No SP or CVA tenderness


Vital Signs





Vital Signs








  Date Time  Temp Pulse Resp B/P (MAP) Pulse Ox O2 Delivery O2 Flow Rate FiO2


 


7/1/20 07:23      Nasal Cannula 2.0 


 


7/1/20 07:00 98.1 71 18 141/78 (99) 96   





 98.1       








Assessment & Plan





ESRD - On HD MWF, switched from PD in 2018  


seen on HD , No complaints, tolerating well, continue as ordered , [facility]  





Dizziness/weakness: CT revealed possible right basal ganglia infarct





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 

tricuspid valve measuring approximately 1.2 x 1.2 cm via MADALYN 6/3/20


Follows with ID  





Coronary artery disease- stable  





 Diabetes.





Acute on chronic diastolic/systolic CHF: recent EF at 40%





CAD: past CABG clinically stable





HTN: controlled





Labs


Labs





Laboratory Tests








Test


 6/30/20


11:23 6/30/20


11:24 6/30/20


12:50


 


Glucose (Fingerstick)


 100 mg/dL


(70-99) 


 





 


White Blood Count


 


 5.1 x10^3/uL


(4.0-11.0) 





 


Red Blood Count


 


 2.83 x10^6/uL


(4.30-5.70) 





 


Hemoglobin


 


 9.7 g/dL


(13.0-17.5) 





 


Hematocrit


 


 29.0 %


(39.0-53.0) 





 


Mean Corpuscular Volume


 


 103 fL


() 





 


Mean Corpuscular Hemoglobin  34 pg (25-35)  


 


Mean Corpuscular Hemoglobin


Concent 


 34 g/dL


(31-37) 





 


Red Cell Distribution Width


 


 18.0 %


(11.5-14.5) 





 


Platelet Count


 


 184 x10^3/uL


(140-400) 





 


Neutrophils (%) (Auto)  66 % (31-73)  


 


Lymphocytes (%) (Auto)  19 % (24-48)  


 


Monocytes (%) (Auto)  12 % (0-9)  


 


Eosinophils (%) (Auto)  3 % (0-3)  


 


Basophils (%) (Auto)  1 % (0-3)  


 


Neutrophils # (Auto)


 


 3.4 x10^3/uL


(1.8-7.7) 





 


Lymphocytes # (Auto)


 


 1.0 x10^3/uL


(1.0-4.8) 





 


Monocytes # (Auto)


 


 0.6 x10^3/uL


(0.0-1.1) 





 


Eosinophils # (Auto)


 


 0.1 x10^3/uL


(0.0-0.7) 





 


Basophils # (Auto)


 


 0.0 x10^3/uL


(0.0-0.2) 





 


Sodium Level


 


 140 mmol/L


(136-145) 





 


Potassium Level


 


 4.7 mmol/L


(3.5-5.1) 





 


Chloride Level


 


 101 mmol/L


() 





 


Carbon Dioxide Level


 


 30 mmol/L


(21-32) 





 


Anion Gap  9 (6-14)  


 


Blood Urea Nitrogen


 


 32 mg/dL


(8-26) 





 


Creatinine


 


 6.0 mg/dL


(0.7-1.3) 





 


Estimated GFR


(Cockcroft-Gault) 


 11.3 


 





 


BUN/Creatinine Ratio  5 (6-20)  


 


Glucose Level


 


 111 mg/dL


(70-99) 





 


Calcium Level


 


 8.7 mg/dL


(8.5-10.1) 





 


Magnesium Level


 


 2.4 mg/dL


(1.8-2.4) 





 


Total Bilirubin


 


 0.3 mg/dL


(0.2-1.0) 





 


Aspartate Amino Transf


(AST/SGOT) 


 14 U/L (15-37) 


 





 


Alanine Aminotransferase


(ALT/SGPT) 


 < 6 U/L


(16-63) 





 


Alkaline Phosphatase


 


 85 U/L


() 





 


Creatine Kinase


 


 57 U/L


() 





 


Creatine Kinase MB (Mass)


 


 < 0.5 ng/mL


(0.0-3.6) 





 


Creatine Kinase MB Relative


Index 


  % (0-4) 


 





 


Troponin I Quantitative


 


 < 0.017 ng/mL


(0.000-0.055) 





 


NT-Pro-B-Type Natriuretic


Peptide 


 6701 pg/mL


(0-124) 





 


Total Protein


 


 7.5 g/dL


(6.4-8.2) 





 


Albumin


 


 3.0 g/dL


(3.4-5.0) 





 


Albumin/Globulin Ratio  0.7 (1.0-1.7)  


 


Prothrombin Time


 


 


 14.7 SEC


(11.7-14.0)


 


Prothromb Time International


Ratio 


 


 1.2 (0.8-1.1) 





 


Activated Partial


Thromboplast Time 


 


 38 SEC (24-38) 











Laboratory Tests








Test


 6/30/20


11:23 6/30/20


11:24 6/30/20


12:50


 


Glucose (Fingerstick)


 100 mg/dL


(70-99) 


 





 


White Blood Count


 


 5.1 x10^3/uL


(4.0-11.0) 





 


Red Blood Count


 


 2.83 x10^6/uL


(4.30-5.70) 





 


Hemoglobin


 


 9.7 g/dL


(13.0-17.5) 





 


Hematocrit


 


 29.0 %


(39.0-53.0) 





 


Mean Corpuscular Volume


 


 103 fL


() 





 


Mean Corpuscular Hemoglobin  34 pg (25-35)  


 


Mean Corpuscular Hemoglobin


Concent 


 34 g/dL


(31-37) 





 


Red Cell Distribution Width


 


 18.0 %


(11.5-14.5) 





 


Platelet Count


 


 184 x10^3/uL


(140-400) 





 


Neutrophils (%) (Auto)  66 % (31-73)  


 


Lymphocytes (%) (Auto)  19 % (24-48)  


 


Monocytes (%) (Auto)  12 % (0-9)  


 


Eosinophils (%) (Auto)  3 % (0-3)  


 


Basophils (%) (Auto)  1 % (0-3)  


 


Neutrophils # (Auto)


 


 3.4 x10^3/uL


(1.8-7.7) 





 


Lymphocytes # (Auto)


 


 1.0 x10^3/uL


(1.0-4.8) 





 


Monocytes # (Auto)


 


 0.6 x10^3/uL


(0.0-1.1) 





 


Eosinophils # (Auto)


 


 0.1 x10^3/uL


(0.0-0.7) 





 


Basophils # (Auto)


 


 0.0 x10^3/uL


(0.0-0.2) 





 


Sodium Level


 


 140 mmol/L


(136-145) 





 


Potassium Level


 


 4.7 mmol/L


(3.5-5.1) 





 


Chloride Level


 


 101 mmol/L


() 





 


Carbon Dioxide Level


 


 30 mmol/L


(21-32) 





 


Anion Gap  9 (6-14)  


 


Blood Urea Nitrogen


 


 32 mg/dL


(8-26) 





 


Creatinine


 


 6.0 mg/dL


(0.7-1.3) 





 


Estimated GFR


(Cockcroft-Gault) 


 11.3 


 





 


BUN/Creatinine Ratio  5 (6-20)  


 


Glucose Level


 


 111 mg/dL


(70-99) 





 


Calcium Level


 


 8.7 mg/dL


(8.5-10.1) 





 


Magnesium Level


 


 2.4 mg/dL


(1.8-2.4) 





 


Total Bilirubin


 


 0.3 mg/dL


(0.2-1.0) 





 


Aspartate Amino Transf


(AST/SGOT) 


 14 U/L (15-37) 


 





 


Alanine Aminotransferase


(ALT/SGPT) 


 < 6 U/L


(16-63) 





 


Alkaline Phosphatase


 


 85 U/L


() 





 


Creatine Kinase


 


 57 U/L


() 





 


Creatine Kinase MB (Mass)


 


 < 0.5 ng/mL


(0.0-3.6) 





 


Creatine Kinase MB Relative


Index 


  % (0-4) 


 





 


Troponin I Quantitative


 


 < 0.017 ng/mL


(0.000-0.055) 





 


NT-Pro-B-Type Natriuretic


Peptide 


 6701 pg/mL


(0-124) 





 


Total Protein


 


 7.5 g/dL


(6.4-8.2) 





 


Albumin


 


 3.0 g/dL


(3.4-5.0) 





 


Albumin/Globulin Ratio  0.7 (1.0-1.7)  


 


Prothrombin Time


 


 


 14.7 SEC


(11.7-14.0)


 


Prothromb Time International


Ratio 


 


 1.2 (0.8-1.1) 





 


Activated Partial


Thromboplast Time 


 


 38 SEC (24-38) 











Review


All relevant outside records, renal labs, imaging studies, telemetry/EKG's were 

reviewed.











SHERRY CHRIS MD                 Jul 1, 2020 09:16

## 2020-07-01 NOTE — NUR
SS following for discharge planning. SS reviewed pt chart and discussed with pt RN. Pt is 
from home and is currently on room air. Pt has outpatient dialysis at [facility], [phone]; fax [phone], Monday, Wednesday, and Friday. PT/OT ordered. SS 
will continue to follow for discharge planning.

## 2020-07-01 NOTE — PDOC2
CARDIAC CONSULT


DATE OF CONSULT


Date of Consult


DATE: 7/1/20 


TIME: 09:03





REASON FOR CONSULT


Reason for Consult:


fluid overload





REFERRING PHYSICIAN


Referring Physician:


Gabriel





SOURCE


Source:  Chart review, Patient





HISTORY OF PRESENT ILLNESS


HISTORY OF PRESENT ILLNESS


This is a pleasant





PAST MEDICAL HISTORY


Past Medical History


Cardiovascular:  CAD, CHF, HTN, cardiomyopathy, endocarditis


Pulmonary:  COPD, Other (ESTEBAN)


GI:  GERD, GI bleed, Gastritis


Hematology anemia


Musculoskeletal:  Osteoarthritis


Infectious disease:  Other (T11-12 diskitis, osteomyelitis, and paraspinal 

infection )


Renal/:  Chronic renal failure (ESRD on HD), Benign prostatic enlarg.


Endocrine:  Diabetes





PAST SURGICAL HISTORY


Past Surgical History


Appendectomy, CABG, Other (right shoulder arthroplasty; right middle and lower 

lobectomy), PD cath placement and removal, EGD, HD placement





FAMILY HISTORY


Family History:  Diabetes





SOCIAL HISTORY


Smoke:  Quit


ALCOHOL:  none


Drugs:  None


Lives:  with Family





CURRENT MEDICATIONS


CURRENT MEDICATIONS





Current Medications








 Medications


  (Trade)  Dose


 Ordered  Sig/Anna


 Route


 PRN Reason  Start Time


 Stop Time Status Last Admin


Dose Admin


 


 Sodium Chloride  500 ml @ 


 500 mls/hr  1X  ONCE


 IV


   6/30/20 13:15


 6/30/20 14:14 DC 6/30/20 13:07





 


 Aspirin


  (Ecotrin)  81 mg  DAILY


 PO


   7/1/20 09:00


    7/1/20 08:46





 


 Docusate Sodium


  (Colace)  100 mg  DAILY


 PO


   7/1/20 09:00


    7/1/20 08:46





 


 Vitamin B Complex/


 Vitamin C


  (Winsome-Samir)  1 tab  DAILY


 PO


   7/1/20 09:00


    7/1/20 08:46





 


 Gabapentin


  (Neurontin)  300 mg  QHS


 PO


   6/30/20 21:00


    6/30/20 22:00





 


 Lactobacillus


 Rhamnosus


  (Culturelle)  1 cap  BID


 PO


   6/30/20 21:00


    7/1/20 08:46





 


 Lidocaine


  (Lidoderm)  1 patch  DAILY


 TP


   7/1/20 09:00


    7/1/20 08:45





 


 Lubiprostone


  (Amitiza)  24 mcg  BIDWMEALS


 PO


   6/30/20 18:00


    7/1/20 08:46





 


 Oxycodone HCl


  (OxyCONTIN)  10 mg  PRN BID  PRN


 PO


 back pain  6/30/20 17:15


    6/30/20 22:00





 


 Pantoprazole


 Sodium


  (Protonix)  40 mg  DAILYAC


 PO


   7/1/20 07:30


    7/1/20 08:47





 


 Polyethylene


 Glycol


  (miraLAX PACKET)  17 gm  PRN DAILY  PRN


 PO


 CONSTIPATION  6/30/20 17:15


    7/1/20 08:47





 


 Senna/Docusate


 Sodium


  (Senna Plus)  2 tab  QHS


 PO


   6/30/20 21:00


    6/30/20 22:02





 


 Sevelamer


 Carbonate


  (Renvela)  400 mg  TID


 PO


   6/30/20 21:00


    7/1/20 08:46





 


 Simvastatin


  (Zocor)  10 mg  QHS


 PO


   6/30/20 21:00


    6/30/20 22:01





 


 Vitamin A/Vitamin


 D


  (Vitamin A & D


 Ointment)  1 dioni  PRN Q1HR  PRN


 TP


 SKIN PROTECTION  6/30/20 17:15


    7/1/20 08:46





 


 Dicyclomine HCl


  (Bentyl)  20 mg  TID PRN  PRN


 PO


 ABDOMINAL PAIN/CRAMPING  6/30/20 17:45


    7/1/20 08:46














ALLERGIES


ALLERGIES:  


Coded Allergies:  


     No Known Drug Allergies (Unverified , 4/22/20)





ROS


Review of System


14 point ROS evaluated with pertinent positives noted per HPI





PHYSICAL EXAM


General:  Alert, Oriented X3, Cooperative, No acute distress


HEENT:  Atraumatic, Mucous membr. moist/pink


Lungs:  Clear to auscultation, Normal air movement


Heart:  Regular rate (SR, no bradycardia), Other (3/6 systolic murmur to LLS 

border)


Abdomen:  Soft, No tenderness


Extremities:  No cyanosis


Skin:  No breakdown, No significant lesion


Neuro:  Normal speech, Sensation intact


Psych/Mental Status:  Mental status NL, Mood NL


MUSCULOSKELETAL:  Osteoarthritic changes both hands





VITALS/I&O


VITALS/I&O:





                                   Vital Signs








  Date Time  Temp Pulse Resp B/P (MAP) Pulse Ox O2 Delivery O2 Flow Rate FiO2


 


7/1/20 07:23      Nasal Cannula 2.0 


 


7/1/20 07:00 98.1 71 18 141/78 (99) 96   





 98.1       














                                    I & O   


 


 6/30/20 6/30/20 7/1/20





 15:00 23:00 07:00


 


Intake Total 500 ml 540 ml 350 ml


 


Balance 500 ml 540 ml 350 ml











LABS


Lab:





                                Laboratory Tests








Test


 6/30/20


11:23 6/30/20


11:24 6/30/20


12:50


 


Glucose (Fingerstick)


 100 mg/dL


(70-99)  H 


 





 


White Blood Count


 


 5.1 x10^3/uL


(4.0-11.0) 





 


Red Blood Count


 


 2.83 x10^6/uL


(4.30-5.70)  L 





 


Hemoglobin


 


 9.7 g/dL


(13.0-17.5)  L 





 


Hematocrit


 


 29.0 %


(39.0-53.0)  L 





 


Mean Corpuscular Volume


 


 103 fL


()  H 





 


Mean Corpuscular Hemoglobin  34 pg (25-35)   


 


Mean Corpuscular Hemoglobin


Concent 


 34 g/dL


(31-37) 





 


Red Cell Distribution Width


 


 18.0 %


(11.5-14.5)  H 





 


Platelet Count


 


 184 x10^3/uL


(140-400) 





 


Neutrophils (%) (Auto)  66 % (31-73)   


 


Lymphocytes (%) (Auto)  19 % (24-48)  L 


 


Monocytes (%) (Auto)  12 % (0-9)  H 


 


Eosinophils (%) (Auto)  3 % (0-3)   


 


Basophils (%) (Auto)  1 % (0-3)   


 


Neutrophils # (Auto)


 


 3.4 x10^3/uL


(1.8-7.7) 





 


Lymphocytes # (Auto)


 


 1.0 x10^3/uL


(1.0-4.8) 





 


Monocytes # (Auto)


 


 0.6 x10^3/uL


(0.0-1.1) 





 


Eosinophils # (Auto)


 


 0.1 x10^3/uL


(0.0-0.7) 





 


Basophils # (Auto)


 


 0.0 x10^3/uL


(0.0-0.2) 





 


Sodium Level


 


 140 mmol/L


(136-145) 





 


Potassium Level


 


 4.7 mmol/L


(3.5-5.1) 





 


Chloride Level


 


 101 mmol/L


() 





 


Carbon Dioxide Level


 


 30 mmol/L


(21-32) 





 


Anion Gap  9 (6-14)   


 


Blood Urea Nitrogen


 


 32 mg/dL


(8-26)  H 





 


Creatinine


 


 6.0 mg/dL


(0.7-1.3)  H 





 


Estimated GFR


(Cockcroft-Gault) 


 11.3  


 





 


BUN/Creatinine Ratio  5 (6-20)  L 


 


Glucose Level


 


 111 mg/dL


(70-99)  H 





 


Calcium Level


 


 8.7 mg/dL


(8.5-10.1) 





 


Magnesium Level


 


 2.4 mg/dL


(1.8-2.4) 





 


Total Bilirubin


 


 0.3 mg/dL


(0.2-1.0) 





 


Aspartate Amino Transferase


(AST) 


 14 U/L (15-37)


L 





 


Alanine Aminotransferase (ALT)


 


 < 6 U/L


(16-63)  L 





 


Alkaline Phosphatase


 


 85 U/L


() 





 


Creatine Kinase


 


 57 U/L


() 





 


Creatine Kinase MB (Mass)


 


 < 0.5 ng/mL


(0.0-3.6) 





 


Creatine Kinase MB Relative


Index 


  % (0-4)  


 





 


Troponin I Quantitative


 


 < 0.017 ng/mL


(0.000-0.055) 





 


NT-Pro-B-Type Natriuretic


Peptide 


 6701 pg/mL


(0-124)  H 





 


Total Protein


 


 7.5 g/dL


(6.4-8.2) 





 


Albumin


 


 3.0 g/dL


(3.4-5.0)  L 





 


Albumin/Globulin Ratio


 


 0.7 (1.0-1.7)


L 





 


Prothrombin Time


 


 


 14.7 SEC


(11.7-14.0)  H


 


Prothrombin Time INR


 


 


 1.2 (0.8-1.1)


H


 


Activated Partial


Thromboplast Time 


 


 38 SEC (24-38)








                                Laboratory Tests


6/30/20 11:24








                                Laboratory Tests


6/30/20 11:24











ECHOCARDIOGRAM


ECHOCARDIOGRAM


MADALYN


<Conclusion>


The systolic function is mildly to moderately impaired. EF 40%


There is moderate global hypokinesis.


There is a mobile mass likely attached to the septal leaflet of the tricuspid 

valve measuring approximately 1.2 x 1.2 cm. Due to the eccentric nature of the 

mass, exact dimensions are difficult to determine.





DATE:     06/03/20 1404





ASSESSMENT/PLAN


ASSESSMENT/PLAN


1. Dizziness/weakness: possibly from dehydration, neurology following. No 

associated dysrrhythmias. 


2. Acute on chronic diastolic/systolic CHF: recent EF at 40%, compensated


3. CAD: past CABG clinically stable


4. HTN: controlled


5. HLP


6. ESRD


7. DM2


8. Recent osteomyelitis/diskitis with endocarditis/mobile mass likely attached 

to the septal leaflet of the tricuspid valve measuring approximately 1.2 x 1.2 

cm via MADALYN 6/3/20





Recommendations


Continue secondary prevention


Fluid off loading per HD


Repeat echo in 6 months. Discussed hydration adequacy


Follow up with Dr. Freedman Sept 16 at 830 AM


May DC per cardiac standpoint











KATIE KHAN           Jul 1, 2020 09:11

## 2020-07-01 NOTE — PDOC2
NEUROLOGY CONSULT


Date of Admission


Date of Admission


DATE: 7/1/20 


TIME: 11:21





Reason for Consult


Reason for Consult:


Transient ischemic attack





Referring Physician


Referring Physician:


Dr. Rios





Source


Source:  Chart review, Patient





History of Present Illness


History of Present Illness


The patient is a 69-year-old right-handed male who has not felt well for the 

past 4 days. He complains of lightheadedness, dizziness, everything going white 

on him, we call over, left side abdominal pain, left back pain. He is feeling 

much better since he was given fluids. There was never any focal issues such as 

diplopia, dysphagia, dysarthria, focal numbness or weakness. The patient has 

never had a stroke, seizure, or head injury.





Past Medical History


Cardiovascular:  CAD, HTN, Hyperlipidemia, Other ( deep venous thrombosis, 

lymphedema)


Pulmonary:  Asthma, Bronchitis, COPD


CENTRAL NERVOUS SYSTEM:  Periperal neuropathy


Heme/Onc:  Anemia NOS


Musculoskeletal:   low back pain (Osteomyelitis of the thoracic spine)


Renal/:  Chronic renal failure ( dialysis)


Endocrine:  Diabetes





Past Surgical History


Past Surgical History:  Cataract Removal, Other ( pulmonary lobectomy, right 

shoulder dislocation)





Family History


Family History:  Cancer





Social History


Social History


, no alcohol or tobacco





Current Medications


Current Medications





Current Medications


Sodium Chloride 500 ml @  500 mls/hr 1X  ONCE IV  Last administered on 6/30/20at

13:07;  Start 6/30/20 at 13:15;  Stop 6/30/20 at 14:14;  Status DC


Alprazolam (Xanax) 0.5 mg PRN BID  PRN PO ANXIETY / AGITATION;  Start 6/30/20 at

17:15


Aspirin (Ecotrin) 81 mg DAILY PO  Last administered on 7/1/20at 08:46;  Start 

7/1/20 at 09:00


Bisacodyl (Dulcolax Tab) 10 mg PRN DAILY  PRN PO CONSTIPATION ([employer]);  

Start 6/30/20 at 17:15


Docusate Sodium (Colace) 100 mg DAILY PO  Last administered on 7/1/20at 08:46;  

Start 7/1/20 at 09:00


Vitamin B Complex/ Vitamin C ([place]-Samir) 1 tab DAILY PO  Last administered on 

7/1/20at 08:46;  Start 7/1/20 at 09:00


Gabapentin (Neurontin) 300 mg QHS PO  Last administered on 6/30/20at 22:00;  

Start 6/30/20 at 21:00


Lactobacillus Rhamnosus (Culturelle) 1 cap BID PO  Last administered on 7/1/20at

08:46;  Start 6/30/20 at 21:00


Lidocaine (Lidoderm) 1 patch DAILY TP  Last administered on 7/1/20at 08:45;  

Start 7/1/20 at 09:00


Lubiprostone (Amitiza) 24 mcg BIDWMEALS PO  Last administered on 7/1/20at 08:46;

 Start 6/30/20 at 18:00


Oxycodone HCl (OxyCONTIN) 10 mg PRN BID  PRN PO back pain Last administered on 

6/30/20at 22:00;  Start 6/30/20 at 17:15


Pantoprazole Sodium (Protonix) 40 mg DAILYAC PO  Last administered on 7/1/20at 

08:47;  Start 7/1/20 at 07:30


Polyethylene Glycol (miraLAX PACKET) 17 gm PRN DAILY  PRN PO CONSTIPATION Last 

administered on 7/1/20at 08:47;  Start 6/30/20 at 17:15


Senna/Docusate Sodium (Senna Plus) 2 tab QHS PO  Last administered on 6/30/20at 

22:02;  Start 6/30/20 at 21:00


Sevelamer Carbonate (Renvela) 400 mg TID PO  Last administered on 7/1/20at 

08:46;  Start 6/30/20 at 21:00


Simvastatin (Zocor) 10 mg QHS PO  Last administered on 6/30/20at 22:01;  Start 

6/30/20 at 21:00


Vitamin A/Vitamin D (Vitamin A & D Ointment) 1 cira PRN Q1HR  PRN TP SKIN 

PROTECTION Last administered on 7/1/20at 08:46;  Start 6/30/20 at 17:15


Dicyclomine HCl (Bentyl) 20 mg TID PRN  PRN PO ABDOMINAL PAIN/CRAMPING Last 

administered on 7/1/20at 08:46;  Start 6/30/20 at 17:45


Sodium Chloride 1,000 ml @  1,000 mls/hr Q1H PRN IV hypotension;  Start 7/1/20 

at 11:01;  Stop 7/1/20 at 17:00


Albumin Human 200 ml @  200 mls/hr 1X PRN  PRN IV Hypotension;  Start 7/1/20 at 

11:15;  Stop 7/1/20 at 17:14


Sodium Chloride 1,000 ml @  400 mls/hr Q2H30M PRN IV PATENCY;  Start 7/1/20 at 

11:01;  Stop 7/1/20 at 23:00


Info (PHARMACY MONITORING -- do not chart) 1 each PRN DAILY  PRN MC SEE 

COMMENTS;  Start 7/1/20 at 11:15


Info (PHARMACY MONITORING -- do not chart) 1 each PRN DAILY  PRN MC SEE 

COMMENTS;  Start 7/1/20 at 11:15





Active Scripts


Active


Lidocaine PATCH  ** (Lidocaine) 1 Each Adh..patch 1 Each TP DAILY


     REMOVE AFTER 12 HOURS THEN LEAVE OFF FOR 12 HOURS PRIOR TO


     PLACEMENT OF NEW PATCH


Oxycontin ** (Oxycodone HCl) 10 Mg Tab.er.12h 1 Tab PO BID PRN MDD 2 Tablet(s)


A and D Ointment (Vits A and D/White Pet/Lanolin) 42.5 Gm Oint...g. 1 Cira TP PRN

Q1HR PRN 14 Days


Culturelle (Lactobacillus Rhamnosus Gg) 1 Each Cap.sprink 1 Cap PO BID 30 Days


Pantoprazole Sodium  ** (Pantoprazole Sodium) 40 Mg Tablet. 40 Mg PO DAILYAC 

30 Days


Amitiza (Lubiprostone) 24 Mcg Capsule 24 Mcg PO BIDWMEALS 30 Days


Bisacodyl 5 Mg Tablet. 10 Mg PO PRN DAILY PRN 30 Days


Gabapentin 300 Mg Capsule 300 Mg PO TID 30 Days


Dicyclomine Hcl 20 Mg Tablet 1 Tab PO TID PRN


Senna Plus Tablet (Sennosides/Docusate Sodium) 1 Each Tablet 2 Tab PO QHS 14 

Days


Colace (Docusate Sodium) 100 Mg Capsule 100 Mg PO DAILY


Polyethylene Glycol 3350 17 Gm Powd.pack 17 Gm PO PRN DAILY PRN


Reported


Alprazolam 0.5 Mg Tablet 0.5 Mg PO PRN BID PRN


Simvastatin 10 Mg Tablet 1 Tab PO QHS


Aspir 81 (Aspirin) 81 Mg Tablet. 1 Tab PO DAILY


Nephro-Samir Tablet (Folic Acid/Vitamin B Comp W-C) 0.8 Mg Tablet 1 Tab PO DAILY


Renvela (Sevelamer Carbonate) 800 Mg Tablet 0.5 Tab PO TID





Allergies


Allergies:  


Coded Allergies:  


     No Known Drug Allergies (Unverified , 4/22/20)





ROS


Review of System


Negative for fever, chills, weight loss, shortness of breath, chest pain, 

indigestion, hematochezia, melena, and dysuria.  Full 14-point review of systems

is negative.





Physical Exam


Physical Examination


General:


Well-developed, well-nourished black male in no acute distress


HEENT:


Normocephalic andatraumatic. Temporal arteriespulsatile and nontender.


Neck:


Supple without bruit, no meningismus


Musculoskeletal:


Stability:see neurologic. Gait exam:see neurologic. Tone:see 

neurologic.Strength:see neurologic.


Neurological:


Mental Status:intact, orientation, memory, attention span/concentration, 

language, fund of knowledge normal. Cranial Nerves:Pupils equal and reactive to

light, extraocular movements areintact, visual fields are full to 

confrontation. Facial sensation is normal. There is no facial asymmetry. 

Vestibulo-ocular reflex is intact. Palate elevates and tongue protrudes in 

midline. All other cranial related problems are negative except as mentioned 

before.Reflexes:1+ and symmetric with flexor plantar responses. Motor:4/5 

strength with normal tone and bulk. Coordination:Finger-nose finger and 

heel-to-shin testing are normal. Rapid alternating movements and fine finger 

movements are intact. Gait: antalgic. Sensory:N stocking loss





Vitals


VITALS





Vital Signs








  Date Time  Temp Pulse Resp B/P (MAP) Pulse Ox O2 Delivery O2 Flow Rate FiO2


 


7/1/20 10:21 98.3 71 18 132/53 (79) 95 Room Air  





 98.3       


 


7/1/20 07:23       2.0 











Labs


Labs





Laboratory Tests








Test


 6/30/20


11:23 6/30/20


11:24 6/30/20


12:50


 


Glucose (Fingerstick)


 100 mg/dL


(70-99) 


 





 


White Blood Count


 


 5.1 x10^3/uL


(4.0-11.0) 





 


Red Blood Count


 


 2.83 x10^6/uL


(4.30-5.70) 





 


Hemoglobin


 


 9.7 g/dL


(13.0-17.5) 





 


Hematocrit


 


 29.0 %


(39.0-53.0) 





 


Mean Corpuscular Volume


 


 103 fL


() 





 


Mean Corpuscular Hemoglobin  34 pg (25-35)  


 


Mean Corpuscular Hemoglobin


Concent 


 34 g/dL


(31-37) 





 


Red Cell Distribution Width


 


 18.0 %


(11.5-14.5) 





 


Platelet Count


 


 184 x10^3/uL


(140-400) 





 


Neutrophils (%) (Auto)  66 % (31-73)  


 


Lymphocytes (%) (Auto)  19 % (24-48)  


 


Monocytes (%) (Auto)  12 % (0-9)  


 


Eosinophils (%) (Auto)  3 % (0-3)  


 


Basophils (%) (Auto)  1 % (0-3)  


 


Neutrophils # (Auto)


 


 3.4 x10^3/uL


(1.8-7.7) 





 


Lymphocytes # (Auto)


 


 1.0 x10^3/uL


(1.0-4.8) 





 


Monocytes # (Auto)


 


 0.6 x10^3/uL


(0.0-1.1) 





 


Eosinophils # (Auto)


 


 0.1 x10^3/uL


(0.0-0.7) 





 


Basophils # (Auto)


 


 0.0 x10^3/uL


(0.0-0.2) 





 


Sodium Level


 


 140 mmol/L


(136-145) 





 


Potassium Level


 


 4.7 mmol/L


(3.5-5.1) 





 


Chloride Level


 


 101 mmol/L


() 





 


Carbon Dioxide Level


 


 30 mmol/L


(21-32) 





 


Anion Gap  9 (6-14)  


 


Blood Urea Nitrogen


 


 32 mg/dL


(8-26) 





 


Creatinine


 


 6.0 mg/dL


(0.7-1.3) 





 


Estimated GFR


(Cockcroft-Gault) 


 11.3 


 





 


BUN/Creatinine Ratio  5 (6-20)  


 


Glucose Level


 


 111 mg/dL


(70-99) 





 


Calcium Level


 


 8.7 mg/dL


(8.5-10.1) 





 


Magnesium Level


 


 2.4 mg/dL


(1.8-2.4) 





 


Total Bilirubin


 


 0.3 mg/dL


(0.2-1.0) 





 


Aspartate Amino Transf


(AST/SGOT) 


 14 U/L (15-37) 


 





 


Alanine Aminotransferase


(ALT/SGPT) 


 < 6 U/L


(16-63) 





 


Alkaline Phosphatase


 


 85 U/L


() 





 


Creatine Kinase


 


 57 U/L


() 





 


Creatine Kinase MB (Mass)


 


 < 0.5 ng/mL


(0.0-3.6) 





 


Creatine Kinase MB Relative


Index 


  % (0-4) 


 





 


Troponin I Quantitative


 


 < 0.017 ng/mL


(0.000-0.055) 





 


NT-Pro-B-Type Natriuretic


Peptide 


 6701 pg/mL


(0-124) 





 


Total Protein


 


 7.5 g/dL


(6.4-8.2) 





 


Albumin


 


 3.0 g/dL


(3.4-5.0) 





 


Albumin/Globulin Ratio  0.7 (1.0-1.7)  


 


Prothrombin Time


 


 


 14.7 SEC


(11.7-14.0)


 


Prothromb Time International


Ratio 


 


 1.2 (0.8-1.1) 





 


Activated Partial


Thromboplast Time 


 


 38 SEC (24-38) 











Laboratory Tests








Test


 6/30/20


11:23 6/30/20


11:24 6/30/20


12:50


 


Glucose (Fingerstick)


 100 mg/dL


(70-99) 


 





 


White Blood Count


 


 5.1 x10^3/uL


(4.0-11.0) 





 


Red Blood Count


 


 2.83 x10^6/uL


(4.30-5.70) 





 


Hemoglobin


 


 9.7 g/dL


(13.0-17.5) 





 


Hematocrit


 


 29.0 %


(39.0-53.0) 





 


Mean Corpuscular Volume


 


 103 fL


() 





 


Mean Corpuscular Hemoglobin  34 pg (25-35)  


 


Mean Corpuscular Hemoglobin


Concent 


 34 g/dL


(31-37) 





 


Red Cell Distribution Width


 


 18.0 %


(11.5-14.5) 





 


Platelet Count


 


 184 x10^3/uL


(140-400) 





 


Neutrophils (%) (Auto)  66 % (31-73)  


 


Lymphocytes (%) (Auto)  19 % (24-48)  


 


Monocytes (%) (Auto)  12 % (0-9)  


 


Eosinophils (%) (Auto)  3 % (0-3)  


 


Basophils (%) (Auto)  1 % (0-3)  


 


Neutrophils # (Auto)


 


 3.4 x10^3/uL


(1.8-7.7) 





 


Lymphocytes # (Auto)


 


 1.0 x10^3/uL


(1.0-4.8) 





 


Monocytes # (Auto)


 


 0.6 x10^3/uL


(0.0-1.1) 





 


Eosinophils # (Auto)


 


 0.1 x10^3/uL


(0.0-0.7) 





 


Basophils # (Auto)


 


 0.0 x10^3/uL


(0.0-0.2) 





 


Sodium Level


 


 140 mmol/L


(136-145) 





 


Potassium Level


 


 4.7 mmol/L


(3.5-5.1) 





 


Chloride Level


 


 101 mmol/L


() 





 


Carbon Dioxide Level


 


 30 mmol/L


(21-32) 





 


Anion Gap  9 (6-14)  


 


Blood Urea Nitrogen


 


 32 mg/dL


(8-26) 





 


Creatinine


 


 6.0 mg/dL


(0.7-1.3) 





 


Estimated GFR


(Cockcroft-Gault) 


 11.3 


 





 


BUN/Creatinine Ratio  5 (6-20)  


 


Glucose Level


 


 111 mg/dL


(70-99) 





 


Calcium Level


 


 8.7 mg/dL


(8.5-10.1) 





 


Magnesium Level


 


 2.4 mg/dL


(1.8-2.4) 





 


Total Bilirubin


 


 0.3 mg/dL


(0.2-1.0) 





 


Aspartate Amino Transf


(AST/SGOT) 


 14 U/L (15-37) 


 





 


Alanine Aminotransferase


(ALT/SGPT) 


 < 6 U/L


(16-63) 





 


Alkaline Phosphatase


 


 85 U/L


() 





 


Creatine Kinase


 


 57 U/L


() 





 


Creatine Kinase MB (Mass)


 


 < 0.5 ng/mL


(0.0-3.6) 





 


Creatine Kinase MB Relative


Index 


  % (0-4) 


 





 


Troponin I Quantitative


 


 < 0.017 ng/mL


(0.000-0.055) 





 


NT-Pro-B-Type Natriuretic


Peptide 


 6701 pg/mL


(0-124) 





 


Total Protein


 


 7.5 g/dL


(6.4-8.2) 





 


Albumin


 


 3.0 g/dL


(3.4-5.0) 





 


Albumin/Globulin Ratio  0.7 (1.0-1.7)  


 


Prothrombin Time


 


 


 14.7 SEC


(11.7-14.0)


 


Prothromb Time International


Ratio 


 


 1.2 (0.8-1.1) 





 


Activated Partial


Thromboplast Time 


 


 38 SEC (24-38) 














Images


Images


CT HEAD WO CONTRAST


 


History: Reason: altered LOC, weakness / Spl. Instructions:  / History: 


 


Comparison/Correlation: None


 


Findings: Axial images of the head were obtained without contrast. Atrophy


is present. No intracranial hemorrhage, midline shift, or mass effect. 


Right basal ganglia lacunar infarct is present of indeterminate age but 


old in appearance. No intracranial hemorrhage, shift, or mass effect. Old 


right medial orbital wall fracture is present. Minimal opacification of 


the left sphenoid sinus is present. Mucosal wall thickening in the right 


maxillary sinus partially seen. Cavernous carotid calcification is 


notable.


 


 


Impression:


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 No intracranial 


hemorrhage.





Assessment/Plan


Assessment/Plan


Impression:


I do not get any flavor of transient ischemic attack, there have never been any 

focal symptoms. Patient was dehydrated, low on fluid, responded well to fluids 

and feels okay now.


Chronic back pain, history of osteomyelitis


Diabetic peripheral property.





Recommendations:


See how he does with some physical therapy today


Consider additional imaging studies of the spine


He does not need any type of workup for transient ischemic attack


Continue aspirin and statin





Thank you for letting me help with the patient's care.











AMAN SIMMS MD            Jul 1, 2020 11:27

## 2020-07-17 ENCOUNTER — HOSPITAL ENCOUNTER (EMERGENCY)
Dept: HOSPITAL 61 - ER | Age: 70
Discharge: HOME | End: 2020-07-17
Payer: COMMERCIAL

## 2020-07-17 VITALS — SYSTOLIC BLOOD PRESSURE: 162 MMHG | DIASTOLIC BLOOD PRESSURE: 80 MMHG

## 2020-07-17 VITALS — WEIGHT: 220 LBS | BODY MASS INDEX: 30.8 KG/M2 | HEIGHT: 71 IN

## 2020-07-17 VITALS — DIASTOLIC BLOOD PRESSURE: 76 MMHG | SYSTOLIC BLOOD PRESSURE: 134 MMHG

## 2020-07-17 VITALS — WEIGHT: 204.37 LBS | BODY MASS INDEX: 28.61 KG/M2 | HEIGHT: 71 IN

## 2020-07-17 DIAGNOSIS — I50.9: ICD-10-CM

## 2020-07-17 DIAGNOSIS — G89.29: Primary | ICD-10-CM

## 2020-07-17 DIAGNOSIS — I13.0: ICD-10-CM

## 2020-07-17 DIAGNOSIS — E11.22: ICD-10-CM

## 2020-07-17 DIAGNOSIS — Z95.1: ICD-10-CM

## 2020-07-17 DIAGNOSIS — I25.10: ICD-10-CM

## 2020-07-17 DIAGNOSIS — J44.9: ICD-10-CM

## 2020-07-17 DIAGNOSIS — M54.6: ICD-10-CM

## 2020-07-17 DIAGNOSIS — M54.5: ICD-10-CM

## 2020-07-17 DIAGNOSIS — Z87.891: ICD-10-CM

## 2020-07-17 DIAGNOSIS — N18.6: ICD-10-CM

## 2020-07-17 DIAGNOSIS — Z99.2: ICD-10-CM

## 2020-07-17 DIAGNOSIS — N18.9: ICD-10-CM

## 2020-07-17 DIAGNOSIS — I13.2: ICD-10-CM

## 2020-07-17 DIAGNOSIS — M46.24: ICD-10-CM

## 2020-07-17 PROCEDURE — 72131 CT LUMBAR SPINE W/O DYE: CPT

## 2020-07-17 PROCEDURE — 72128 CT CHEST SPINE W/O DYE: CPT

## 2020-07-17 PROCEDURE — 96372 THER/PROPH/DIAG INJ SC/IM: CPT

## 2020-07-17 PROCEDURE — 99283 EMERGENCY DEPT VISIT LOW MDM: CPT

## 2020-07-17 NOTE — PHYS DOC
Past Medical History


Past Medical History:  CAD, CHF, COPD, Diabetes-Type II, Hypertension, Renal 

Disease, Renal Failure, Other


Additional Past Medical Histor:  Chronic Right neck pain,LEFT UPPER ARM FISTULA


Past Surgical History:  Coronary Bypass Surgery, Other


Additional Past Surgical Histo:  R middle and lower lobe lung removal, vein 

graft left thigh, SHUNT RT CHEST


Smoking Status:  Former Smoker


Alcohol Use:  None


Drug Use:  None





General Adult


EDM:


Chief Complaint:  BACK PAIN - NO INJURY





HPI:


HPI:





Patient is a 69  year old male who presents for evaluation of acute on chronic 

back pain.  Patient takes lidocaine patches as well as OxyContin for this 

condition.  He states that his doctor has been weaning him down from the 10 mg 

dose down to the 5 mg dose.  However he had doubled up on the lower dose and now

is out of medication.  Patient is very upset and wants something strong for 

pain.  Patient is a dialysis patient and receives dialysis on Monday, Wednesday 

and .  Patient did miss his dialysis on Wednesday.  Patient is due for 

dialysis later today.  Patient states he is asking for pain medication so he can

make his dialysis appointment today.  Patient has a history of chronic 

constipation as well.  Symptoms have been progressing in his back since April of

this year.  Patient sees Dr. Crook for dialysis as well as Dr. Delaney.  Patient 

denies loss of bowel bladder control, footdrop or saddle anesthesia





Review of Systems:


Review of Systems:


Constitutional:   Denies fever or chills. []


Eyes:   Denies change in visual acuity. []


HENT:   Denies nasal congestion or sore throat. [] 


Respiratory:   Denies cough or shortness of breath. [] 


Cardiovascular:   Denies chest pain or edema. [] 


GI:   Denies abdominal pain, nausea, vomiting, bloody stools or diarrhea. [] 


:  Denies dysuria. [] 


Musculoskeletal:   has back pain and joint pain. [] 


Integument:   Denies rash. [] 


Neurologic:   Denies headache, focal weakness or sensory changes. [] 


Endocrine:   Denies polyuria or polydipsia. [] 


Lymphatic:  Denies swollen glands. [] 


Psychiatric:  Denies depression or anxiety. []





Heart Score:


Risk Factors:


Risk Factors:  DM, Current or recent (<one month) smoker, HTN, HLP, family 

history of CAD, obesity.


Risk Scores:


Score 0 - 3:  2.5% MACE over next 6 weeks - Discharge Home


Score 4 - 6:  20.3% MACE over next 6 weeks - Admit for Clinical Observation


Score 7 - 10:  72.7% MACE over next 6 weeks - Early Invasive Strategies





Allergies:


Allergies:





Allergies








Coded Allergies Type Severity Reaction Last Updated Verified


 


  No Known Drug Allergies    20 No











Physical Exam:


PE:





Constitutional: Well developed, well nourished, mild acute distress, non-toxic 

appearance. []


HENT: Normocephalic, atraumatic, bilateral external ears normal, oropharynx 

moist, no oral exudates, nose normal. []


Eyes: PERRL, EOMI, conjunctiva normal, no discharge. [] 


Neck: Normal range of motion, no tenderness, supple, no stridor. [] 


Cardiovascular:Heart rate regular rhythm, no murmur []


Lungs & Thorax:  Bilateral breath sounds clear to auscultation []


Abdomen: Bowel sounds normal, soft, no tenderness. [] 


Skin: Warm, dry, no erythema, no rash. [] 


Back: diffuse paraspinal lumbar level tenderness, no CVA tenderness. [] 


Extremities: No tenderness, no cyanosis, no clubbing, ROM intact, no edema, 

dialysis shunt left arm in place. [] 


Neurologic: Alert and oriented, normal motor function, normal sensory function, 

no focal deficits noted. []


Psychologic: Affect normal, judgement normal, mood normal. []





EKG:


EKG:


[]





Radiology/Procedures:


Radiology/Procedures:


Latest back xray was back in May:





[facility]


                    [address] [place] [address]


                                 [phone]


                                        


                                 IMAGING REPORT





                                     Signed





PATIENT: RAFIQ MENDES   ACCOUNT: [id number]     MRN#: [id number]


: 1950           LOCATION: ER              AGE: 69


SEX: M                    EXAM DT: 20         ACCESSION#: [id number]


STATUS: PRE ER            ORD. PHYSICIAN: PHIL CARDONA


REASON: HX THORACIC OSTEOMYLITIS, SAME PAIN, WAS NOT GETTING ABX


PROCEDURE: CT LUMBAR SPINE RECONSTRUCTION





CT THORACIC SPINE WO CONTRAST, CT ABDOMEN PELVIS WO CONTRAST, CT LUMBAR 


SPINE RECONSTRUCTION


 


History: Abdominal pain. HX THORACIC OSTEOMYLITIS, SAME PAIN, WAS NOT 


GETTING ABX 


 


Technique: Noncontrast examination of the abdomen and pelvis. Coronal and 


sagittal reconstructions were performed.


Noncontrast CT of the thoracic and lumbar spine was also obtained. Coronal


and sagittal reconstructions were performed.


 


Exposure: One or more of the following individualized dose reduction 


techniques were utilized for this examination:  


1. Automated exposure control  


2. Adjustment of the mA and/or kV according to patient size  


3. Use of iterative reconstruction technique.


 


Comparison:  CT abdomen pelvis 2020


 


Findings:


CT abdomen and pelvis:


Lower chest: Calcified left lower lobe pulmonary nodule. Bibasilar linear 


atelectasis.


 


Abdomen and pelvis: The liver, spleen, adrenal glands, pancreas and 


gallbladder are unremarkable. Bilateral renal atrophy. Bilateral renal 


hypodense lesions, likely cysts although incompletely characterized on 


noncontrast examination, unchanged. No hydronephrosis. Right posterior 


urinary bladder diverticulum.


 


Large amount stool throughout the colon and rectum. Colonic 


diverticulosis. Appendix not well seen. No evidence of bowel obstruction. 


No pathologic lymphadenopathy. No ascites. Atheromatous plaque throughout 


the nonaneurysmal abdominal aorta and branch vessels. Small fat-containing


left inguinal hernia.


 


Bones: Right femoral head avascular necrosis, unchanged.


 


CT thoracic spine.


Deformity of C7-T1 with intervertebral fusion, may relate to prior trauma 


or infection.


 


Findings of discitis osteomyelitis T11-T12 with increased endplate erosive


changes and fragmentation of the anterior superior endplate at T12. Mild 


increased height loss of T11 and T12. Increased adjacent paravertebral 


inflammatory changes and probable phlegmon. Inflammatory changes extend 


into the T7 T12 neuroforamen.


 


Reverse S-shaped curvature of the thoracic lumbar spine. Mild multilevel 


degenerative disc changes. No high-grade canal narrowing. No high-grade 


neural foraminal narrowing.


 


Lumbar spine CT:


L2-L3 and L4-L5 endplate erosive changes, similar compared to prior. 


Vacuum disc phenomenon. Schmorl's nodes noted. Normal vertebral body 


height. No fracture. Curvature noted.


 


Moderate multilevel degenerative disc changes most prominent L3-L4 and 


L5-S1. Subarticular recess narrowing L4-L5. Multilevel neuroforaminal 


narrowing most prominent right L3-L4 and L4-L5. Severe left L5-S1 


neuroforaminal narrowing.


 


Impression:


1.  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 


loss and paravertebral inflammatory changes.


2.  Moderate multilevel lumbar spondylosis, as described.


3.  Large amount stool throughout the colon.


4.  Right femoral head avascular necrosis, unchanged.


 


Electronically signed by: Apollo Celestin DO (2020 1:58 PM) [id number]














DICTATED and SIGNED BY:     APOLLO CELESTIN DO


DATE:     20 1358


[]





Course & Med Decision Making:


Course & Med Decision Making


Pertinent Labs and Imaging studies reviewed. (See chart for details)





[]





Dragon Disclaimer:


Dragon Disclaimer:


This electronic medical record was generated, in whole or in part, using a voice

 recognition dictation system.





0205 stable, feeling better at this time.  Will give patient prescription for 6 

percocet.  His ongoing chronic pain will be left for management by his primary 

care provider.  Patient states his pain is now controlled and he can get his 

dialysis this morning





Departure


Departure


Impression:  


   Primary Impression:  


   Chronic back pain


   


   Additional Impression:  


   ESRD (end stage renal disease) on dialysis


Disposition:   HOME, SELF-CARE


Condition:  STABLE


Referrals:  


KIESHA DELANEY MD (PCP)


Patient Instructions:  Chronic Back Pain, Dialysis





Additional Instructions:  


No heavy lifting, see your doctor regarding your chronic back pain and pain 

management.  Be sure to keep your appointment to get your dialysis this morning


Scripts


Oxycodone Hcl/Aspirin (OXYCODONE-ASPIRIN 4. MG) 1 Each Tablet


1 TAB PO PRN TID PRN for pain MDD 3 Tablet(s) for 30 Days, #6 TAB 0 Refills


   Prov: CONNIE DAWKINS DO         20





Justicifation of Admission Dx:


Justifications for Admission:


Justification of Admission Dx:  N/A











CONNIE DAWKINS DO              2020 00:57

## 2020-07-17 NOTE — RAD
Exam: CT the thoracic and lumbar spine without contrast

 

INDICATION: Pain

 

TECHNIQUE: Sequential axial images through the thoracic and lumbar spine 

obtained without IV contrast. Sagittal and coronal reformatted images were

reconstructed from the axial data and reviewed.

 

Comparisons: 5/31/2020

 

FINDINGS:

 

Thoracic spine:

There is straightening of the thoracic spine similar compared to prior 

study. Osseous interbody fusion at C1-C2 with wedging is noted.

 

There is redemonstration of erosive changes at T11-T12 which overall 

appears similar when compared to the prior exam.

 

Acute fracture in the thoracic spine is not identified.

 

There remains mild paraspinal soft tissue at the T11-T12 level which is 

similar compared to prior study.

 

Lumbar spine:

Vertebral body heights and alignment are well-maintained.

 

Fracture to the lumbar spine is not identified.

 

There is redemonstration of multilevel degenerative change in the lumbar 

spine with degenerative disc disease greatest at L2-L3, L3-L4 and L5-S1. 

Mild bilateral facet arthropathy is noted in the lumbar spine.

 

Visualized paraspinal soft tissues are unremarkable.

 

IMPRESSION:

1.  Again seen are findings of discitis osteomyelitis at T11-T12 disc 

space with mild adjacent perivertebral extension which is not 

significantly changed compared to the prior study.

2.  Multilevel spondylotic change in the lumbar spine, again similar when 

compared to prior study.

 

 

Exposure: One or more of the following in the visualized dose reduction 

techniques were utilized for this examination:

1.  Automated exposure control

2.  Adjustment of the MA and/or KV according to patient size

3.  Use of iterative of reconstructive technique

 

Electronically signed by: Fozia Tyson MD (7/17/2020 9:19 PM) [id number]

## 2020-07-17 NOTE — PHYS DOC
Past Medical History


Past Medical History:  CAD, CHF, COPD, Diabetes-Type II, Hypertension, Renal 

Disease, Renal Failure, Other


Additional Past Medical Histor:  Chronic Right neck pain,LEFT UPPER ARM FISTULA


Past Surgical History:  Coronary Bypass Surgery, Other


Additional Past Surgical Histo:  R middle and lower lobe lung removal, vein 

graft left thigh, SHUNT RT CHEST


Smoking Status:  Former Smoker


Alcohol Use:  None


Drug Use:  None





General Adult


EDM:


Chief Complaint:  PAIN CONTROL





HPI:


HPI:





Patient is a 69  year old male who presents with was seen this morning for 

patient's chronic low back pain.  States he is out of pain medications.  There 

is a history of thoracic osteomyelitis and goes to pain management.  Patient was

discharged with oxycodone with aspirin and it.  No pharmacies have oxycodone 

with aspirin in it.  In the past patient was given oxycodone 10 mg.  Patient 

states this works for him.  Patient is here for a different pain medication that

he can get filled.  Patient states the pain feels the same.  Patient has not had

radiology done since the end of May.  At that time it showed continued 

osteomyelitis.  Patient complained of 10 out of 10 pain.  Patient states he 

continues to have burning in his feet bilaterally also.  Patient states this is 

nothing new is been going on for months.





Review of Systems:


Review of Systems:


Constitutional:   Denies fever or chills. []


Eyes:   Denies change in visual acuity. []


HENT:   Denies nasal congestion or sore throat. [] 


Respiratory:   Denies cough or shortness of breath. [] 


Cardiovascular:   Denies chest pain or edema. [] 


GI:   Denies abdominal pain, nausea, vomiting, bloody stools or diarrhea. [] 


:  Denies dysuria. [] 


Musculoskeletal:   Chronic back pain and needs a new prescription or joint pain.

 [] 


Integument:   Denies rash. [] 


Neurologic:   Denies headache, focal weakness or sensory changes. [] 


Endocrine:   Denies polyuria or polydipsia. [] 


Lymphatic:  Denies swollen glands. [] 


Psychiatric:  Denies depression or anxiety. []





Heart Score:


Risk Factors:


Risk Factors:  DM, Current or recent (<one month) smoker, HTN, HLP, family 

history of CAD, obesity.


Risk Scores:


Score 0 - 3:  2.5% MACE over next 6 weeks - Discharge Home


Score 4 - 6:  20.3% MACE over next 6 weeks - Admit for Clinical Observation


Score 7 - 10:  72.7% MACE over next 6 weeks - Early Invasive Strategies





Allergies:


Allergies:





Allergies








Coded Allergies Type Severity Reaction Last Updated Verified


 


  No Known Drug Allergies    20 No











Physical Exam:


PE:





Constitutional: Well developed, well nourished, no acute distress, non-toxic 

appearance. []


HENT: Normocephalic, atraumatic, bilateral external ears normal, oropharynx 

moist, no oral exudates, nose normal. []


Eyes: PERRLA, EOMI, conjunctiva normal, no discharge. [] 


Neck: Normal range of motion, no tenderness, supple, no stridor. [] 


Cardiovascular:Heart rate regular rhythm, no murmur []


Lungs & Thorax:  Bilateral breath sounds clear to auscultation []


Abdomen: Bowel sounds normal, soft, no tenderness, no masses, no pulsatile 

masses. [] 


Skin: Warm, dry, no erythema, no rash. [] 


Back: Generalized tenderness to thoracic and lumbar spine, no CVA tenderness. []

 


Extremities: No tenderness, no cyanosis, no clubbing, ROM intact, no edema. [] 


Neurologic: Alert and oriented X 3, normal motor function, normal sensory 

function, no focal deficits noted. []


Psychologic: Affect normal, judgement normal, mood normal. []





Current Patient Data:


Vital Signs:





                                   Vital Signs








  Date Time  Temp Pulse Resp B/P (MAP) Pulse Ox O2 Delivery O2 Flow Rate FiO2


 


20 19:14 98.6 66 16 125/61 (82) 98 Room Air  





 98.6       











EKG:


EKG:


[]





Radiology/Procedures:


Radiology/Procedures:


[]


Impression:


                            [facility]


                    [address]  [place], [place] [address] [phone]


                                        


                                 IMAGING REPORT





                                     Signed





PATIENT: RAFIQ MENDES   ACCOUNT: [id number]     MRN#: [id number]


: 1950           LOCATION: ER              AGE: 69


SEX: M                    EXAM DT: 20         ACCESSION#: [id number]


STATUS: REG ER            ORD. PHYSICIAN: PHIL CARDONA


REASON: pain


PROCEDURE: CT THORACIC SPINE WO CONTRAST





Exam: CT the thoracic and lumbar spine without contrast


 


INDICATION: Pain


 


TECHNIQUE: Sequential axial images through the thoracic and lumbar spine 


obtained without IV contrast. Sagittal and coronal reformatted images were


reconstructed from the axial data and reviewed.


 


Comparisons: 2020


 


FINDINGS:


 


Thoracic spine:


There is straightening of the thoracic spine similar compared to prior 


study. Osseous interbody fusion at C1-C2 with wedging is noted.


 


There is redemonstration of erosive changes at T11-T12 which overall 


appears similar when compared to the prior exam.


 


Acute fracture in the thoracic spine is not identified.


 


There remains mild paraspinal soft tissue at the T11-T12 level which is 


similar compared to prior study.


 


Lumbar spine:


Vertebral body heights and alignment are well-maintained.


 


Fracture to the lumbar spine is not identified.


 


There is redemonstration of multilevel degenerative change in the lumbar 


spine with degenerative disc disease greatest at L2-L3, L3-L4 and L5-S1. 


Mild bilateral facet arthropathy is noted in the lumbar spine.


 


Visualized paraspinal soft tissues are unremarkable.


 


IMPRESSION:


1.  Again seen are findings of discitis osteomyelitis at T11-T12 disc 


space with mild adjacent perivertebral extension which is not 


significantly changed compared to the prior study.


2.  Multilevel spondylotic change in the lumbar spine, again similar when 


compared to prior study.


 


 


Exposure: One or more of the following in the visualized dose reduction 


techniques were utilized for this examination:


1.  Automated exposure control


2.  Adjustment of the MA and/or KV according to patient size


3.  Use of iterative of reconstructive technique


 


Electronically signed by: Fozia Ellis MD (2020 9:19 PM) [id number]














DICTATED and SIGNED BY:     FOZIA ELLIS MD


DATE:     20





Course & Med Decision Making:


Course & Med Decision Making


Pertinent Labs and Imaging studies reviewed. (See chart for details)





Patient states he has tenderness with palpation to lumbar and thoracic spine.  

Patient is stable and be discharged home with a different kind of pain 

medication.  Patient states he went to dialysis today as he was supposed too.  

Patient uses a cane to ambulate.  Denies chest pain, shortness of air, focal 

weakness, loss of bowel bladder, abdominal pain, nausea, vomiting, diarrhea, 

fever, focal weakness.  Denies any new numbness or tingling.





New CTs show all chronic findings and no worsening.  We will give patient a 

prescription for pain medication and follow-up with his primary care. The other 

prescription he received from Dr Glynn is shredded here at the hospital.





[]





Dragon Disclaimer:


Dragon Disclaimer:


This electronic medical record was generated, in whole or in part, using a voice

 recognition dictation system.





Departure


Departure


Impression:  


   Primary Impression:  


   Chronic back pain


   Qualified Codes:  M54.6 - Pain in thoracic spine; G89.29 - Other chronic pain


Disposition:   HOME, SELF-CARE


Condition:  STABLE


Referrals:  


KIESHA NUNO MD (PCP)


Patient Instructions:  Back Pain, Adult, Chronic Back Pain





Additional Instructions:  


Follow-up with your primary care provider as soon as possible.  Take medication 

as prescribed and do not drink alcohol or drive while taking this medication.


Scripts


Oxycodone HCl (Oxycontin) 10 Mg Tab.er.12h


1 TAB PO BID for pain MDD 2 Tablet(s), #8 TAB 0 Refills


   Prov: PHIL CARDONA         20





Justicifation of Admission Dx:


Justifications for Admission:


Justification of Admission Dx:  N/A











PHIL CARDONA            2020 19:58

## 2020-07-24 ENCOUNTER — HOSPITAL ENCOUNTER (EMERGENCY)
Dept: HOSPITAL 61 - ER | Age: 70
Discharge: HOME | End: 2020-07-24
Payer: COMMERCIAL

## 2020-07-24 VITALS — SYSTOLIC BLOOD PRESSURE: 129 MMHG | DIASTOLIC BLOOD PRESSURE: 69 MMHG

## 2020-07-24 VITALS — HEIGHT: 71 IN | WEIGHT: 201.28 LBS | BODY MASS INDEX: 28.18 KG/M2

## 2020-07-24 DIAGNOSIS — M79.672: ICD-10-CM

## 2020-07-24 DIAGNOSIS — I50.9: ICD-10-CM

## 2020-07-24 DIAGNOSIS — M79.671: ICD-10-CM

## 2020-07-24 DIAGNOSIS — L97.519: ICD-10-CM

## 2020-07-24 DIAGNOSIS — N18.9: ICD-10-CM

## 2020-07-24 DIAGNOSIS — E11.22: ICD-10-CM

## 2020-07-24 DIAGNOSIS — G62.9: Primary | ICD-10-CM

## 2020-07-24 DIAGNOSIS — Z87.891: ICD-10-CM

## 2020-07-24 DIAGNOSIS — I13.0: ICD-10-CM

## 2020-07-24 DIAGNOSIS — Z98.890: ICD-10-CM

## 2020-07-24 PROCEDURE — 99284 EMERGENCY DEPT VISIT MOD MDM: CPT

## 2020-07-24 NOTE — PHYS DOC
Past Medical History


Past Medical History:  CAD, CHF, COPD, Diabetes-Type II, Hypertension, Renal 

Disease, Renal Failure, Other


Additional Past Medical Histor:  Chronic Right neck pain,LEFT UPPER ARM FISTULA


Past Surgical History:  Coronary Bypass Surgery, Other


Additional Past Surgical Histo:  R middle and lower lobe lung removal, vein 

graft left thigh, SHUNT RT CHEST


Smoking Status:  Former Smoker


Alcohol Use:  None


Drug Use:  None





General Adult


EDM:


Chief Complaint:  LOWER EXT PAIN





HPI:


HPI:





Patient is a 69  year old male who presents with burning pain to his bilateral 

feet.  The pain is chronic in nature worse over last couple days.  Patient stat

es he is out of his OxyContin and cannot get more till his doctors back on 

Monday.  Patient denies any fever.  Patient denies any trauma to the area.  

Patient underwent a full dialysis today and is at his dry weight.  Patient 

denies any trouble breathing.





Review of Systems:


Review of Systems:


Constitutional:   Denies fever or chills. []


Eyes:   Denies change in visual acuity. []


HENT:   Denies nasal congestion or sore throat. [] 


Respiratory:   Denies cough or shortness of breath. [] 


Cardiovascular:   Denies chest pain or edema. [] 


GI:   Denies abdominal pain, nausea, vomiting, bloody stools or diarrhea. [] 


:  Denies dysuria. [] 


Musculoskeletal:   Denies back pain or joint pain. [] 


Integument:   Denies rash. [] 


Neurologic:   Denies headache, focal weakness or sensory changes. [] 


Endocrine:   Denies polyuria or polydipsia. [] 


Lymphatic:  Denies swollen glands. [] 


Psychiatric:  Denies depression or anxiety. []





Heart Score:


Risk Factors:


Risk Factors:  DM, Current or recent (<one month) smoker, HTN, HLP, family 

history of CAD, obesity.


Risk Scores:


Score 0 - 3:  2.5% MACE over next 6 weeks - Discharge Home


Score 4 - 6:  20.3% MACE over next 6 weeks - Admit for Clinical Observation


Score 7 - 10:  72.7% MACE over next 6 weeks - Early Invasive Strategies





Allergies:


Allergies:





Allergies








Coded Allergies Type Severity Reaction Last Updated Verified


 


  No Known Drug Allergies    4/22/20 No











Physical Exam:


PE:


Constitutional: Well developed, well nourished, no acute distress, non-toxic 

appearance. 


HENT: No trismus, external ears normal


Eyes: Conjunctiva clear, EOMI


Neck: Normal range of motion, no tenderness, supple, no stridor. 


Cardiovascular: Regular rate/rhythm, diminished dorsalis pedis bilaterally.  But

 the pulses are present.


Lungs & Thorax:  No respiratory distress


Abdomen: No distension


Skin: Warm, dry, superficial ulceration to the right great toe on the medial 

distal aspect.


Back: Full ROM


Extremities: 3+ bilateral lower extremities edema decreased sensation to the 

toes.  There is a start of an ulceration that is dry without erythema or 

drainage on the distal right great toe.,  Dialysis fistula left upper extremity


Neurologic: Alert and oriented X 3, normal motor function, , decreased sensation

 to the bilateral toes


Psychologic: Affect normal, judgement normal, mood normal.





EKG:


EKG:


[]





Radiology/Procedures:


Radiology/Procedures:


[]





Course & Med Decision Making:


Course & Med Decision Making


Pertinent Labs and Imaging studies reviewed. (See chart for details)





[] 69-year-old male with chronic neuropathy pain presents with an exacerbation 

of his neuropathic pain.  Of note he does have a small ulceration on the right 

great toe.  No evidence of drainage, erythema or warmth.  Discussed with patient

 wound care and return precautions for this.  I also discussed the patient I 

would not be able to give him OxyContin but I will give him a short burst of o

xycodone until his doctor get back on Monday.  No evidence of acute arterial 

occlusion.  Patient was placed on antibiotics for the ulceration on his toe.





Dragon Disclaimer:


Dragon Disclaimer:


This electronic medical record was generated, in whole or in part, using a voice

 recognition dictation system.





Departure


Departure


Impression:  


   Primary Impression:  


   Neuropathic pain of both feet


   Additional Impression:  


   Toe ulcer, right


Disposition:  01 HOME, SELF-CARE


Condition:  STABLE


Referrals:  


KIESHA NUNO MD (PCP)


2-3 DAYS


Patient Instructions:  Diabetes and Foot Care, Diabetic Neuropathy





Additional Instructions:  


EMERGENCY DEPARTMENT GENERAL DISCHARGE INSTRUCTIONS





THANK YOU for coming to [facility] Emergency Department (ED) 

today and 


trusting us with your care.  We trust that you had a positive experience in our 

Emergency 


Department. If you wish to speak to the department Management you can contact 

the department 


Director at [phone].








YOUR FOLLOW UP INSTRUCTIONS ARE AS FOLLOWS: 





Do you have a private doctor? If you do not have a private doctor, please ask 

for a resource 


list of physicians or clinics that may be able to assist you with follow up 

care.  





The Emergency Physician has interpreted your x-rays. The X-ray specialist will 

also review 


them. If there is a change in the findings you will be notified in 48 hours when

 at all 


possible. 





A lab test or lab culture may have been done, your results will be reviewed and 

you will be 


notified if you need a change in treatment. 








ADDITIONAL INSTRUCTIONS AND INFORMATION 





Your care today has been supervised by a physician who is specially trained in 

emergency 


care. Many problems require more than one evaluation for a complete diagnosis 

and treatment. 


We recommend that you schedule your follow up appointment as recommended to 

ensure complete 


treatment of your illness or injury.  If you are unable to obtain follow up care

 and 


continue to have a problem, or if your condition worsens we recommend that you 

return to the 


ED. 





We are not able to safely determine your condition over the phone nor are we 

able to give 


sound medical advice over the phone. For these safety reasons, if you call for 

medical 


advice we will ask you to come to the ED for further evaluation





If you have any questions regarding these discharge instructions please call the

 ED at [phone].





SAFETY INFORMATION





In the interest of safety, wellness, and injury prevention; we encourage you to 

wear your 


seatbelt, if you smoke; quit smoking, and we encourage your family to use 

protective helmet 


for bicycling and other sporting events that present an increased risk for head 

injury. 





IF YOUR SYMPTOMS WORSEN OR NEW SYMPTOMS DEVELOP, OR YOU HAVE CONCERNS ABOUT YOUR

 CONDITION; 


OR IF YOUR CONDITION WORSENS WHILE YOU ARE WAITING FOR YOUR FOLLOW UP 

APPOINTMENT;  EITHER  


CONTACT YOUR PRIMARY CARE DOCTOR, THE PHYSICIAN WHOSE NAME AND NUMBER YOU WERE 

GIVEN,  OR 


RETURN TO THE  ED IMMEDIATELY.








Return if concerns.  Return if increased pain to the ulceration on the right 

great toe.  Return if redness or fever or warmth of the right great toe or 

drainage from the right great toe


Scripts


Doxycycline Monohydrate (DOXYCYCLINE MONOHYDRATE) 100 Mg Capsule


1 CAP PO BID, #14 CAP


   Prov: YAMILET AUGUSTIN MD         7/24/20 


Oxycodone/Apap 5-325 (PERCOCET 5-325 MG TABLET **) 1 Each Tablet


1-2 EACH PO PRN TID PRN for PAIN, #12 TAB


   pain


   Prov: YAMILET AUGUSTIN MD         7/24/20





Justicifation of Admission Dx:


Justifications for Admission:


Justification of Admission Dx:  N/A











YAMILET AUGUSTIN MD              Jul 24, 2020 14:33

## 2021-04-03 ENCOUNTER — HOSPITAL ENCOUNTER (EMERGENCY)
Dept: HOSPITAL 61 - ER | Age: 71
Discharge: HOME | End: 2021-04-03
Payer: MEDICARE

## 2021-04-03 VITALS — BODY MASS INDEX: 35.92 KG/M2 | HEIGHT: 69 IN | WEIGHT: 242.51 LBS

## 2021-04-03 VITALS — DIASTOLIC BLOOD PRESSURE: 81 MMHG | SYSTOLIC BLOOD PRESSURE: 133 MMHG

## 2021-04-03 DIAGNOSIS — J44.9: ICD-10-CM

## 2021-04-03 DIAGNOSIS — G89.29: Primary | ICD-10-CM

## 2021-04-03 DIAGNOSIS — N18.9: ICD-10-CM

## 2021-04-03 DIAGNOSIS — Z98.890: ICD-10-CM

## 2021-04-03 DIAGNOSIS — Z87.891: ICD-10-CM

## 2021-04-03 DIAGNOSIS — M54.42: ICD-10-CM

## 2021-04-03 DIAGNOSIS — Z94.0: ICD-10-CM

## 2021-04-03 DIAGNOSIS — E11.22: ICD-10-CM

## 2021-04-03 DIAGNOSIS — I11.0: ICD-10-CM

## 2021-04-03 DIAGNOSIS — I50.9: ICD-10-CM

## 2021-04-03 DIAGNOSIS — R11.0: ICD-10-CM

## 2021-04-03 PROCEDURE — 99284 EMERGENCY DEPT VISIT MOD MDM: CPT

## 2021-04-03 NOTE — PHYS DOC
Past Medical History


Past Medical History:  CAD, CHF, COPD, Diabetes-Type II, Hypertension, Renal 

Disease, Renal Failure, Other


Additional Past Medical Histor:  Chronic Right neck pain,LEFT UPPER ARM FISTULA


 (HATTIE PRATER LISA APRN)


Past Surgical History:  Coronary Bypass Surgery, Other


Additional Past Surgical Histo:  R middle and lower lobe lung removal, vein 

graft left thigh, SHUNT RT CHEST


 (MOIHATTIE GONZALEZ APRN)


Smoking Status:  Former Smoker


Alcohol Use:  None


Drug Use:  None


 (ABADFINNHATTIE GONZALEZ APRN)





General Adult


EDM:


Chief Complaint:  BACK PAIN - NO INJURY





HPI:


HPI:





Patient is a 70  year old male with history of diabetes type 2, hypertension, 

hyperlipidemia, CAD, CHF, end-stage kidney disease on dialysis Monday Wednesday Friday last dialyzed yesterday, who presents to the ED today complaining of 

infection to his low back.  Patient states he is in the process of getting a 

kidney transplant at [facility].  He states he was doing his 

pretransplant work-up which included a CAT scan of the abdomen and pelvis which 

showed, he states the called him and told him on Thursday the Ct shows he could 

have infection on his back.  He states he is here today to get some antibiotics 

to hold him over until Monday when he goes to [facility].  He states he does not

want to be admitted in the hospital because this is  Easter weekend and he does 

not want to spend it in the hospital.  He states he is aware he has no fever and

does not feel sick.  He states he has chronic nausea and would like Zofran.  He 

states he has chronic low back pain and gets his pain prescriptions from his 

primary care doctor who he states has told  him if he tries to get any narcotic 

pain medicine from a different provider he will be fired.  He states he gets his

30-day supply from his PCP only.  He states his back pain is chronic and usually

radiates to the left lower extremity.  Patient denies any injuries.  Denies any 

loss of bowel/bladder function.  He states he has had infection in his back 

before and was treated by Dr. Crook but states this time he does not want Dr. Crook.


 (HATTIE PRATER APRN)





Review of Systems:


Review of Systems:


Constitutional:   Denies fever or chills. []


Eyes:   Denies change in visual acuity. []


HENT:   Denies nasal congestion or sore throat. [] 


Respiratory:   Denies cough or shortness of breath. [] 


Cardiovascular:   Denies chest pain or edema. [] 


GI:   Denies abdominal pain, nausea, vomiting, bloody stools or diarrhea. [] 


:  Denies dysuria. [] 


Musculoskeletal:   Reports chronic back pain and low back infection


Integument:   Denies rash. [] 


Neurologic:   Denies headache, focal weakness or sensory changes. [] 


Psychiatric:  Denies depression or anxiety. []


 (HATTIE PRATER)





Heart Score:


C/O Chest Pain:  N/A


Risk Factors:


Risk Factors:  DM, Current or recent (<one month) smoker, HTN, HLP, family hi

story of CAD, obesity.


Risk Scores:


Score 0 - 3:  2.5% MACE over next 6 weeks - Discharge Home


Score 4 - 6:  20.3% MACE over next 6 weeks - Admit for Clinical Observation


Score 7 - 10:  72.7% MACE over next 6 weeks - Early Invasive Strategies


 (HATTIE PRATER)





Current Medications:





Current Medications








 Medications


  (Trade)  Dose


 Ordered  Sig/Anna  Start Time


 Stop Time Status Last Admin


Dose Admin


 


 Ondansetron HCl


  (Zofran Odt)  4 mg  1X  ONCE  4/3/21 13:30


 4/3/21 13:31   











 (HATTIE PRATER)





Allergies:


Allergies:





Allergies








Coded Allergies Type Severity Reaction Last Updated Verified


 


  No Known Drug Allergies    4/22/20 No








 (HATTIE PRATER)





Physical Exam:


PE:





Constitutional: Well developed, well nourished, no acute distress, non-toxic 

appearance. []


HENT: Normocephalic, atraumatic, bilateral external ears normal, oropharynx 

moist, no oral exudates, nose normal. []


Eyes: PERRLA, EOMI, conjunctiva normal, no discharge. [] 


Neck: Normal range of motion, no tenderness, supple, no stridor. [] 


Cardiovascular: Old healed surgical incision noted midline chest.  Heart rate re

gular rhythm, no murmur 


Dialysis fistula left upper extremity with positive bruit and thrill


Lungs & Thorax:  Bilateral breath sounds clear to auscultation []


Abdomen: Bowel sounds normal, soft, no tenderness, no masses, no pulsatile 

masses. [] 


Skin: Warm, dry, no erythema, no rash. [] 


Back: No tenderness, no CVA tenderness. [] 


Extremities: No tenderness, no cyanosis, no clubbing, ROM intact, no edema. [] 


Neurologic: Alert and oriented X 3, normal motor function, normal sensory 

function, no focal deficits noted. []


Psychologic: Affect normal, judgement normal, mood normal.  Very talkative 

pleasant today


 (HATTIE PRATER APRN)





Current Patient Data:


Vital Signs:





                                   Vital Signs








  Date Time  Temp Pulse Resp B/P (MAP) Pulse Ox O2 Delivery O2 Flow Rate FiO2


 


4/3/21 11:27 98.6 86 18 133/81 (98) 98 Room Air  





 98.6       








 (HATTIE PRATER APRN)





EKG:


EKG:


[]


 (HATTIE PRATER APRN)





Radiology/Procedures:


Radiology/Procedures:


[]


 (HATTIE PRATER APRN)





Course & Med Decision Making:


Course & Med Decision Making


Pertinent Labs and Imaging studies reviewed. (See chart for details)





This is a 70-year-old male patient well-known to this ED presenting today 

stating he needs antibiotic for the low back infection.  Patient states he was 

doing pre-kidney transplant work-up at [facility] on Thursday this week

 and was noted to have lumbar spine infection.  Patient states they offered him 

to return to the hospital but he refused because he does not want to be admitted

 over the Easter weekend. He states his needs to be given oral antibiotics to 

hold him over until Monday.





I got patient's records from [facility] where he had a CAT scan of the 

abdomen and pelvic which showed. 


Endplate irregularity and vertebral body height loss centered at T11-T12 disc 

spaces.  3D possibility of discitis/osteomyelitis is raised based on the imaging

 appearance consider further evaluation with MRI with and without contrast of 

thoracic spine.





Informed patient considering the read on this CAT scan he needs to be admitted 

to the hospital get MRI and started on IV antibiotics.  Patient states this is 

Easter weekend and he does not want to be in the hospital.  He states he has no 

fever he states he knows his body very well.  He is requesting to be discharged 

and he will follow up with [facility] on Monday.  He also states he 

prefers to be at [facility] where he will have a transplant so that id does he 

does not interfer with all the work-up they are doing [facility].  We 

talked extensively.  Patient states is reasonable  he states he will follow-up 

with [facility] on Monday.  He was discharged home








 (HATTIE PRATER)





Dragon Disclaimer:


Dragon Disclaimer:


This electronic medical record was generated, in whole or in part, using a voice

 recognition dictation system.


 (HATTIE PRATER)





Departure


Departure


Impression:  


   Primary Impression:  


   Back pain


   Qualified Codes:  M54.42 - Lumbago with sciatica, left side; G89.29 - Other 

   chronic pain


Disposition:  01 DC HOME SELF CARE/HOMELESS


Condition:  STABLE


Referrals:  


KIESHA NUNO MD (PCP)


Follow-up with your doctor on Monday


Patient Instructions:  Back Pain, Adult





Additional Instructions:  


Please follow-up with [facility] on Monday.  Please come back to the ED

 at any point you have concerning symptoms especially fever, chest pain, 

shortness of breath, uncontrolled back pain or any other concerning symptoms.





We wish you all the best with kidney transplant.





Attending Signature


Attending Signature


I have reviewed the PA/NP's note and plan of care. I was available for 

consultation as needed during the patient's visit in the emergency department. I

 agree with the clinical impression, plan, and disposition.


 (POONAM GALICIA DO)











HATTIE PRATER             Apr 3, 2021 13:35


POONAM GALICIA DO              Apr 5, 2021 16:16

## 2021-04-05 ENCOUNTER — HOSPITAL ENCOUNTER (INPATIENT)
Dept: HOSPITAL 61 - ER | Age: 71
LOS: 2 days | Discharge: HOME | DRG: 551 | End: 2021-04-07
Attending: FAMILY MEDICINE | Admitting: FAMILY MEDICINE
Payer: COMMERCIAL

## 2021-04-05 VITALS — SYSTOLIC BLOOD PRESSURE: 133 MMHG | DIASTOLIC BLOOD PRESSURE: 79 MMHG

## 2021-04-05 VITALS — DIASTOLIC BLOOD PRESSURE: 56 MMHG | SYSTOLIC BLOOD PRESSURE: 98 MMHG

## 2021-04-05 VITALS — HEIGHT: 70 IN | BODY MASS INDEX: 31.69 KG/M2 | WEIGHT: 221.34 LBS

## 2021-04-05 DIAGNOSIS — Z99.2: ICD-10-CM

## 2021-04-05 DIAGNOSIS — Z87.891: ICD-10-CM

## 2021-04-05 DIAGNOSIS — E66.01: ICD-10-CM

## 2021-04-05 DIAGNOSIS — Z96.611: ICD-10-CM

## 2021-04-05 DIAGNOSIS — Z83.3: ICD-10-CM

## 2021-04-05 DIAGNOSIS — Z98.1: ICD-10-CM

## 2021-04-05 DIAGNOSIS — N18.6: ICD-10-CM

## 2021-04-05 DIAGNOSIS — E11.69: ICD-10-CM

## 2021-04-05 DIAGNOSIS — Z86.79: ICD-10-CM

## 2021-04-05 DIAGNOSIS — Z51.5: ICD-10-CM

## 2021-04-05 DIAGNOSIS — I50.43: ICD-10-CM

## 2021-04-05 DIAGNOSIS — J44.9: ICD-10-CM

## 2021-04-05 DIAGNOSIS — K21.9: ICD-10-CM

## 2021-04-05 DIAGNOSIS — Z82.49: ICD-10-CM

## 2021-04-05 DIAGNOSIS — E78.5: ICD-10-CM

## 2021-04-05 DIAGNOSIS — M51.36: ICD-10-CM

## 2021-04-05 DIAGNOSIS — I13.2: ICD-10-CM

## 2021-04-05 DIAGNOSIS — I25.10: ICD-10-CM

## 2021-04-05 DIAGNOSIS — G89.29: ICD-10-CM

## 2021-04-05 DIAGNOSIS — Z95.1: ICD-10-CM

## 2021-04-05 DIAGNOSIS — M19.90: ICD-10-CM

## 2021-04-05 DIAGNOSIS — E11.22: ICD-10-CM

## 2021-04-05 DIAGNOSIS — M54.5: Primary | ICD-10-CM

## 2021-04-05 LAB
ALBUMIN SERPL-MCNC: 3.7 G/DL (ref 3.4–5)
ALBUMIN/GLOB SERPL: 0.8 {RATIO} (ref 1–1.7)
ALP SERPL-CCNC: 105 U/L (ref 46–116)
ALT SERPL-CCNC: 21 U/L (ref 16–63)
ANION GAP SERPL CALC-SCNC: 9 MMOL/L (ref 6–14)
AST SERPL-CCNC: 14 U/L (ref 15–37)
BASOPHILS # BLD AUTO: 0 X10^3/UL (ref 0–0.2)
BASOPHILS NFR BLD: 1 % (ref 0–3)
BILIRUB SERPL-MCNC: 0.4 MG/DL (ref 0.2–1)
BUN SERPL-MCNC: 27 MG/DL (ref 8–26)
BUN/CREAT SERPL: 4 (ref 6–20)
CALCIUM SERPL-MCNC: 8.6 MG/DL (ref 8.5–10.1)
CHLORIDE SERPL-SCNC: 100 MMOL/L (ref 98–107)
CO2 SERPL-SCNC: 32 MMOL/L (ref 21–32)
CREAT SERPL-MCNC: 6.8 MG/DL (ref 0.7–1.3)
EOSINOPHIL NFR BLD: 0.1 X10^3/UL (ref 0–0.7)
EOSINOPHIL NFR BLD: 2 % (ref 0–3)
ERYTHROCYTE [DISTWIDTH] IN BLOOD BY AUTOMATED COUNT: 15.8 % (ref 11.5–14.5)
GFR SERPLBLD BASED ON 1.73 SQ M-ARVRAT: 9.8 ML/MIN
GLUCOSE SERPL-MCNC: 78 MG/DL (ref 70–99)
HCT VFR BLD CALC: 33.4 % (ref 39–53)
HGB BLD-MCNC: 11.2 G/DL (ref 13–17.5)
LYMPHOCYTES # BLD: 1.2 X10^3/UL (ref 1–4.8)
LYMPHOCYTES NFR BLD AUTO: 24 % (ref 24–48)
MCH RBC QN AUTO: 35 PG (ref 25–35)
MCHC RBC AUTO-ENTMCNC: 34 G/DL (ref 31–37)
MCV RBC AUTO: 105 FL (ref 79–100)
MONO #: 0.5 X10^3/UL (ref 0–1.1)
MONOCYTES NFR BLD: 10 % (ref 0–9)
NEUT #: 3.2 X10^3/UL (ref 1.8–7.7)
NEUTROPHILS NFR BLD AUTO: 64 % (ref 31–73)
PLATELET # BLD AUTO: 118 X10^3/UL (ref 140–400)
POTASSIUM SERPL-SCNC: 4.1 MMOL/L (ref 3.5–5.1)
PROT SERPL-MCNC: 8.3 G/DL (ref 6.4–8.2)
RBC # BLD AUTO: 3.19 X10^6/UL (ref 4.3–5.7)
SODIUM SERPL-SCNC: 141 MMOL/L (ref 136–145)
WBC # BLD AUTO: 4.9 X10^3/UL (ref 4–11)

## 2021-04-05 PROCEDURE — 85025 COMPLETE CBC W/AUTO DIFF WBC: CPT

## 2021-04-05 PROCEDURE — 85651 RBC SED RATE NONAUTOMATED: CPT

## 2021-04-05 PROCEDURE — 87040 BLOOD CULTURE FOR BACTERIA: CPT

## 2021-04-05 PROCEDURE — 80053 COMPREHEN METABOLIC PANEL: CPT

## 2021-04-05 PROCEDURE — 83605 ASSAY OF LACTIC ACID: CPT

## 2021-04-05 PROCEDURE — 96374 THER/PROPH/DIAG INJ IV PUSH: CPT

## 2021-04-05 PROCEDURE — G0378 HOSPITAL OBSERVATION PER HR: HCPCS

## 2021-04-05 PROCEDURE — 84145 PROCALCITONIN (PCT): CPT

## 2021-04-05 PROCEDURE — 87340 HEPATITIS B SURFACE AG IA: CPT

## 2021-04-05 PROCEDURE — 86140 C-REACTIVE PROTEIN: CPT

## 2021-04-05 PROCEDURE — 80048 BASIC METABOLIC PNL TOTAL CA: CPT

## 2021-04-05 PROCEDURE — 96375 TX/PRO/DX INJ NEW DRUG ADDON: CPT

## 2021-04-05 PROCEDURE — 36415 COLL VENOUS BLD VENIPUNCTURE: CPT

## 2021-04-05 PROCEDURE — 99285 EMERGENCY DEPT VISIT HI MDM: CPT

## 2021-04-05 PROCEDURE — 82962 GLUCOSE BLOOD TEST: CPT

## 2021-04-05 PROCEDURE — 5A1D70Z PERFORMANCE OF URINARY FILTRATION, INTERMITTENT, LESS THAN 6 HOURS PER DAY: ICD-10-PCS | Performed by: INTERNAL MEDICINE

## 2021-04-05 RX ADMIN — OXYCODONE HYDROCHLORIDE SCH MG: 10 TABLET, FILM COATED, EXTENDED RELEASE ORAL at 21:55

## 2021-04-05 RX ADMIN — SIMVASTATIN SCH MG: 10 TABLET, FILM COATED ORAL at 21:55

## 2021-04-05 RX ADMIN — HEPARIN SODIUM SCH UNIT: 5000 INJECTION, SOLUTION INTRAVENOUS; SUBCUTANEOUS at 21:32

## 2021-04-05 NOTE — NUR
The patient, RAFIQ MENDES, 71 y/o, M admitted by COLLINS OWUSU MD, was given written 
information regarding hospital policies, unit procedures and contact persons.  



Valuables were checked and pt. refused to allow security to lock up any belongings.  Meds 
were reconciled and message sent to Dr. Owusu to restart them.  Pt. denies needs 
currently.  



Dr. Thompson and Dr. ANT Crook notified of consults.  Dr. Crook ordered a blood culture and to 
stop antibiotics and will address need in am.

## 2021-04-05 NOTE — ED.ADGEN
Past Medical History


Past Medical History:  CAD, CHF, COPD, Diabetes-Type II, Hypertension, Renal 

Disease, Renal Failure, Other


Additional Past Medical Histor:  Chronic Right neck pain,LEFT UPPER ARM FISTULA


Past Surgical History:  Coronary Bypass Surgery, Other


Additional Past Surgical Histo:  R middle and lower lobe lung removal, vein 

graft left thigh, SHUNT RT CHEST


Smoking Status:  Former Smoker


Alcohol Use:  None


Drug Use:  None





General Adult


EDM:


Chief Complaint:  MULTIPLE COMPLAINTS





HPI:


HPI:





Patient is a 70  year old AA male who presents emergency department with 

continued reports of having an infection in his low back.  Patient states that 

he was here on Saturday after he had a CT scan of his abdomen and pelvis that 

showed a possible infection in his back according to . [place] where he is 

being worked up for kidney transplant. Patient stated that he did not want to be

admitted because it was Easter weekend.  He denies any increase in his pain.  

Patient denies any saddle anesthesia, or loss of bowel/bladder control.  He 

denies any fever, body aches, or chills.  Patient reports chronic nausea but 

denies any vomiting, diarrhea, or abdominal pain.  He currently rates his pain a

9/10 on the pain scale. He denies any alleviating factors. His history includes:

type 2 diabetes, hypertension, hyperlipidemia, CAD, CHF, end-stage kidney dis

ease on dialysis Monday Wednesday Friday last dialyzed today. Pt states he has 

been having chronic back pain problems for over a year after a medical procedure

by Dr. Crook.





Review of Systems:


Review of Systems:


Complete ROS is negative unless otherwise noted in the HPI





Current Medications:





Current Medications








 Medications


  (Trade)  Dose


 Ordered  Sig/[facility]  Start Time


 Stop Time Status Last Admin


Dose Admin


 


 Morphine Sulfate


  (Morphine


 Sulfate)  4 mg  1X  ONCE  4/5/21 16:30


 4/5/21 16:32 DC 4/5/21 17:09


4 MG


 


 Ondansetron HCl


  (Zofran)  4 mg  1X  ONCE  4/5/21 16:30


 4/5/21 16:32 DC 4/5/21 17:09


4 MG











Allergies:


Allergies:





Allergies








Coded Allergies Type Severity Reaction Last Updated Verified


 


  No Known Drug Allergies    4/22/20 No











Physical Exam:


PE:





Constitutional: Well developed, well nourished, no acute distress, non-toxic 

appearance. []


HENT: Normocephalic, atraumatic, bilateral external ears normal, oropharynx 

moist, no oral exudates, nose normal. []


Eyes: PERRLA, EOMI, conjunctiva normal, no discharge. [] 


Neck: Normal range of motion, no stridor. [] 


Cardiovascular:Heart rate regular rhythm


Lungs & Thorax:  respirations are even and unlabored, no retractions, speaking 

full sentences. 


Abdomen: soft, no tenderness


Skin: Warm, dry, no erythema, no rash. [] 


Back: No obvious deformity 


Extremities: No  cyanosis, ROM intact, no edema. [] 


Neurologic: Alert and oriented X 3, normal motor function, normal sensory 

function, no focal deficits noted. []


Psychologic: Affect normal, judgement normal, mood normal. []





Current Patient Data:


Labs:





                                Laboratory Tests








Test


 4/5/21


16:48


 


White Blood Count


 4.9 x10^3/uL


(4.0-11.0)


 


Red Blood Count


 3.19 x10^6/uL


(4.30-5.70)  L


 


Hemoglobin


 11.2 g/dL


(13.0-17.5)  L


 


Hematocrit


 33.4 %


(39.0-53.0)  L


 


Mean Corpuscular Volume


 105 fL


()  H


 


Mean Corpuscular Hemoglobin 35 pg (25-35)  


 


Mean Corpuscular Hemoglobin


Concent 34 g/dL


(31-37)


 


Red Cell Distribution Width


 15.8 %


(11.5-14.5)  H


 


Platelet Count


 118 x10^3/uL


(140-400)  L


 


Neutrophils (%) (Auto) 64 % (31-73)  


 


Lymphocytes (%) (Auto) 24 % (24-48)  


 


Monocytes (%) (Auto) 10 % (0-9)  H


 


Eosinophils (%) (Auto) 2 % (0-3)  


 


Basophils (%) (Auto) 1 % (0-3)  


 


Neutrophils # (Auto)


 3.2 x10^3/uL


(1.8-7.7)


 


Lymphocytes # (Auto)


 1.2 x10^3/uL


(1.0-4.8)


 


Monocytes # (Auto)


 0.5 x10^3/uL


(0.0-1.1)


 


Eosinophils # (Auto)


 0.1 x10^3/uL


(0.0-0.7)


 


Basophils # (Auto)


 0.0 x10^3/uL


(0.0-0.2)


 


Sodium Level


 141 mmol/L


(136-145)


 


Potassium Level


 4.1 mmol/L


(3.5-5.1)


 


Chloride Level


 100 mmol/L


()


 


Carbon Dioxide Level


 32 mmol/L


(21-32)


 


Anion Gap 9 (6-14)  


 


Blood Urea Nitrogen


 27 mg/dL


(8-26)  H


 


Creatinine


 6.8 mg/dL


(0.7-1.3)  H


 


Estimated GFR


(Cockcroft-Gault) 9.8  





 


BUN/Creatinine Ratio 4 (6-20)  L


 


Glucose Level


 78 mg/dL


(70-99)


 


Lactic Acid Level


 1.6 mmol/L


(0.4-2.0)


 


Calcium Level


 8.6 mg/dL


(8.5-10.1)


 


Total Bilirubin


 0.4 mg/dL


(0.2-1.0)


 


Aspartate Amino Transferase


(AST) 14 U/L (15-37)


L


 


Alanine Aminotransferase (ALT)


 21 U/L (16-63)





 


Alkaline Phosphatase


 105 U/L


()


 


C-Reactive Protein,


Quantitative 3.3 mg/L


(0-3.3)


 


Total Protein


 8.3 g/dL


(6.4-8.2)  H


 


Albumin


 3.7 g/dL


(3.4-5.0)


 


Albumin/Globulin Ratio


 0.8 (1.0-1.7)


L


 


Procalcitonin


 0.17 ng/mL


(0.00-0.10)  H





                                Laboratory Tests


4/5/21 16:48








                                Laboratory Tests


4/5/21 16:48








Vital Signs:





                                   Vital Signs








  Date Time  Temp Pulse Resp B/P (MAP) Pulse Ox O2 Delivery O2 Flow Rate FiO2


 


4/5/21 17:15  68  149/68 (95) 92 Room Air  


 


4/5/21 15:22 99.6  12     





 99.6       











EKG:


EKG:


[]





Heart Score:


C/O Chest Pain:  No


Risk Scores:


Score 0 - 3:  2.5% MACE over next 6 weeks - Discharge Home


Score 4 - 6:  20.3% MACE over next 6 weeks - Admit for Clinical Observation


Score 7 - 10:  72.7% MACE over next 6 weeks - Early Invasive Strategies





Radiology/Procedures:


Radiology/Procedures:


[]





Course & Med Decision Making:


Course & Med Decision Making


Pertinent Labs and Imaging studies reviewed. (See chart for details)


I reviewed the notes from patient's visit on 4/3.  The NP that evaluated the 

patient during this visit reviewed the records from [facility] that revealed:


   Endplate irregularity and vertebral body height loss centered at T11-T12 disc

 spaces.  3D possibility of discitis/osteomyelitis is raised based on the 

imaging appearance consider further evaluation with MRI with and without 

contrast of thoracic spine.





[id number] with Dr. Dan who is the admitting physician, and care was 

assumed following discussion of patient.  Will admit patient for back pain and 

abnormal thoracic CT as observation status.


Patient's vital signs stable. Patient remains afebrile, appears nontoxic, 

respirations even and unlabored. Patient will be admitted to the medical/s

urgical floor. Patient's case and plan of care also discussed with Dr. Evans


[]





Aishwarya Disclaimer:


Dragon Disclaimer:


This electronic medical record was generated, in whole or in part, using a voice

 recognition dictation system.





Departure


Departure


Referrals:  


KIESHA NUNO MD (PCP)











CHRISTIANNE MULLER        Apr 5, 2021 15:47

## 2021-04-05 NOTE — PDOC1
History and Physical


Date of Admission


Date of Admission


DATE: 4/5/21 


TIME: 19:05





Identification/Chief Complaint


Chief Complaint


LOW BACK PAIN t max in er 99.6





History of Present Illness


History of Present Illness


 SEEN IN ER WITH persistent worsening back pain and hx diskitis 70  year old 

male with history of diabetes type 2, hypertension, hyperlipidemia, CAD, CHF, 

end-stage kidney disease on dialysis Monday Wednesday Friday  who presents to 

the ED  complaining of infection to his low back.  Patient states he is in the p

rocess of getting a kidney transplant at [facility].  t-max er 99.6 F  

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  

06-03-20 ? 





 states he was doing his pretransplant work-up which included a CAT scan of the 

abdomen and pelvis which showed, he states the called him and told him on 

Thursday the Ct shows he could have infection on his back.  He states he is here

today to get some antibiotics  He states he refused admit  over this  [place] 

weekend   He states he has chronic nausea , id consulted





Past Medical History


Past Medical History:  CAD, CHF, COPD, Diabetes-Type II, Hypertension, Renal 

Disease, Renal Failure, Other


Additional Past Medical Histor:  Chronic Right neck pain,LEFT UPPER ARM FISTULA


Past Surgical History:  Coronary Bypass Surgery, Other


Additional Past Surgical Histo:  R middle and lower lobe lung removal, vein 

graft left thigh, SHUNT RT CHEST


Smoking Status:  Former Smoker


Alcohol Use:  None


Drug Use:  None








fhx obesity


Cardiovascular:  CAD, HTN, Hyperlipidemia, Other


Pulmonary:  Asthma, Bronchitis, COPD


CENTRAL NERVOUS SYSTEM:  Periperal neuropathy


GI:  GERD, GI bleed, Gastritis


Heme/Onc:  Anemia NOS


Hepatobiliary:  No pertinent hx


Psych:  No pertinent hx


Musculoskeletal:   low back pain


Rheumatologic:  No pertinent hx


Infectious disease:  Other


Renal/:  Chronic renal failure


Endocrine:  Diabetes





Past Surgical History


Past Surgical History:  Cataract Removal, Other





Family History


Family History:  Diabetes, Hypertension





Social History


Smoke:  No


ALCOHOL:  none


Drugs:  None





Current Problem List


Problem List


Problems


Medical Problems:


(1) Chronic back pain


Status: Acute  











Current Medications


Current Medications





Current Medications


Morphine Sulfate (Morphine Sulfate) 4 mg 1X  ONCE IV  Last administered on 

4/5/21at 17:09;  Start 4/5/21 at 16:30;  Stop 4/5/21 at 16:32;  Status DC


Ondansetron HCl (Zofran) 4 mg 1X  ONCE IV  Last administered on 4/5/21at 17:09; 

Start 4/5/21 at 16:30;  Stop 4/5/21 at 16:32;  Status DC





Active Scripts


Active


Doxycycline Monohydrate 100 Mg Capsule 1 Cap PO BID


Percocet 5-325 Mg Tablet ** (Oxycodone/Acetaminophen) 1 Each Tablet 1-2 Each PO 

PRN TID PRN


     pain


Oxycontin (Oxycodone HCl) 10 Mg Tab.er.12h 1 Tab PO BID MDD 2 Tablet(s)


Oxycodone-Aspirin 4. Mg (Oxycodone Hcl/Aspirin) 1 Each Tablet 1 Tab PO PRN

TID PRN MDD 3 Tablet(s) 30 Days


Lidocaine PATCH  ** (Lidocaine) 1 Each Adh..patch 1 Each TP DAILY


     REMOVE AFTER 12 HOURS THEN LEAVE OFF FOR 12 HOURS PRIOR TO


     PLACEMENT OF NEW PATCH


Oxycontin ** (Oxycodone HCl) 10 Mg Tab.er.12h 1 Tab PO BID PRN MDD 2 Tablet(s)


A and D Ointment (Vits A and D/White Pet/Lanolin) 42.5 Gm Oint...g. 1 Cira TP PRN

Q1HR PRN 14 Days


Culturelle (Lactobacillus Rhamnosus Gg) 1 Each Cap.sprink 1 Cap PO BID 30 Days


Pantoprazole Sodium  ** (Pantoprazole Sodium) 40 Mg Tablet. 40 Mg PO DAILYAC 

30 Days


Amitiza (Lubiprostone) 24 Mcg Capsule 24 Mcg PO BIDWMEALS 30 Days


Bisacodyl 5 Mg Tablet. 10 Mg PO PRN DAILY PRN 30 Days


Gabapentin 300 Mg Capsule 300 Mg PO TID 30 Days


Dicyclomine Hcl 20 Mg Tablet 1 Tab PO TID PRN


Senna Plus Tablet (Sennosides/Docusate Sodium) 1 Each Tablet 2 Tab PO QHS 14 

Days


Colace (Docusate Sodium) 100 Mg Capsule 100 Mg PO DAILY


Polyethylene Glycol 3350 17 Gm Powd.pack 17 Gm PO PRN DAILY PRN


Reported


Alprazolam 0.5 Mg Tablet 0.5 Mg PO PRN BID PRN


Simvastatin 10 Mg Tablet 1 Tab PO QHS


Aspir 81 (Aspirin) 81 Mg Tablet. 1 Tab PO DAILY


Nephro-Samir Tablet (Folic Acid/Vitamin B Comp W-C) 0.8 Mg Tablet 1 Tab PO DAILY


Renvela (Sevelamer Carbonate) 800 Mg Tablet 0.5 Tab PO TID





Allergies


Allergies:  


Coded Allergies:  


     No Known Drug Allergies (Unverified , 4/22/20)





ROS


Review of System


Constitutional:   pos  fever / chills. []


Eyes:   Denies change in visual acuity. []


HENT:   Denies nasal congestion or sore throat. [] 


Respiratory:   Denies cough or shortness of breath. [] 


Cardiovascular:   Denies chest pain or edema. [] 


GI:   Denies abdominal pain, nausea, vomiting, bloody stools or diarrhea. [] 


:  Denies dysuria. [] 


Musculoskeletal:   pos back pain  x several days  [] 


Integument:   Denies rash. [] 


Neurologic:   Denies headache, focal weakness or sensory changes. [] 


Endocrine:   Denies polyuria or polydipsia. [] 


Lymphatic:  Denies swollen glands. [] 


Psychiatric:  Denies depression or anxiety. []





14 pt ros otherwise neg





Physical Exam


Physical Exam





Constitutional: Well developed, well nourished, mild  acute distress, non-toxic 

appearance. []


HENT: Normocephalic, atraumatic, bilateral external ears normal, oropharynx 

moist, no oral exudates, nose normal. []


Eyes: PERRLA, EOMI, conjunctiva normal, no discharge. [] 


Neck: Normal range of motion, no tenderness, supple, no stridor. [] 


Cardiovascular:Heart rate regular rhythm, no murmur []


Lungs & Thorax:  Bilateral breath sounds clear to auscultation []


Abdomen: Bowel sounds normal, soft, no tenderness, no masses, no pulsatile 

masses. [] 


Skin: Warm, dry, no erythema, no rash. [] 


Back: No tenderness, no CVA tenderness. [] 


Extremities: No tenderness, no cyanosis, no clubbing, ROM intact, no edema. [] 


Neurologic: Alert and oriented X 3, normal motor function, normal sensory 

function, no focal deficits noted. []


Psychologic: Affect normal, judgment normal, mood normal. []


General:  Alert, Oriented X3, Cooperative, mild distress


HEENT:  Atraumatic, PERRLA


Lungs:  Clear to auscultation


Heart:  RRR, no thrills, no rubs


Breasts:  Not examined


Abdomen:  Normal bowel sounds, Soft, No tenderness


Rectal Exam:  not examined


Extremities:  No cyanosis


Neuro:  Normal speech, Cranial nerves 3-12 NL


Psych/Mental Status:  Mental status NL, Mood NL





Vitals


Vitals





Vital Signs








  Date Time  Temp Pulse Resp B/P (MAP) Pulse Ox O2 Delivery O2 Flow Rate FiO2


 


4/5/21 18:00  88  117/59 (78) 100 Room Air  


 


4/5/21 15:22 99.6  12     





 99.6       











Labs


Labs





Laboratory Tests








Test


 4/5/21


16:48


 


White Blood Count


 4.9 x10^3/uL


(4.0-11.0)


 


Red Blood Count


 3.19 x10^6/uL


(4.30-5.70)


 


Hemoglobin


 11.2 g/dL


(13.0-17.5)


 


Hematocrit


 33.4 %


(39.0-53.0)


 


Mean Corpuscular Volume


 105 fL


()


 


Mean Corpuscular Hemoglobin 35 pg (25-35) 


 


Mean Corpuscular Hemoglobin


Concent 34 g/dL


(31-37)


 


Red Cell Distribution Width


 15.8 %


(11.5-14.5)


 


Platelet Count


 118 x10^3/uL


(140-400)


 


Neutrophils (%) (Auto) 64 % (31-73) 


 


Lymphocytes (%) (Auto) 24 % (24-48) 


 


Monocytes (%) (Auto) 10 % (0-9) 


 


Eosinophils (%) (Auto) 2 % (0-3) 


 


Basophils (%) (Auto) 1 % (0-3) 


 


Neutrophils # (Auto)


 3.2 x10^3/uL


(1.8-7.7)


 


Lymphocytes # (Auto)


 1.2 x10^3/uL


(1.0-4.8)


 


Monocytes # (Auto)


 0.5 x10^3/uL


(0.0-1.1)


 


Eosinophils # (Auto)


 0.1 x10^3/uL


(0.0-0.7)


 


Basophils # (Auto)


 0.0 x10^3/uL


(0.0-0.2)


 


Sodium Level


 141 mmol/L


(136-145)


 


Potassium Level


 4.1 mmol/L


(3.5-5.1)


 


Chloride Level


 100 mmol/L


()


 


Carbon Dioxide Level


 32 mmol/L


(21-32)


 


Anion Gap 9 (6-14) 


 


Blood Urea Nitrogen


 27 mg/dL


(8-26)


 


Creatinine


 6.8 mg/dL


(0.7-1.3)


 


Estimated GFR


(Cockcroft-Gault) 9.8 





 


BUN/Creatinine Ratio 4 (6-20) 


 


Glucose Level


 78 mg/dL


(70-99)


 


Lactic Acid Level


 1.6 mmol/L


(0.4-2.0)


 


Calcium Level


 8.6 mg/dL


(8.5-10.1)


 


Total Bilirubin


 0.4 mg/dL


(0.2-1.0)


 


Aspartate Amino Transf


(AST/SGOT) 14 U/L (15-37) 





 


Alanine Aminotransferase


(ALT/SGPT) 21 U/L (16-63) 





 


Alkaline Phosphatase


 105 U/L


()


 


Total Protein


 8.3 g/dL


(6.4-8.2)


 


Albumin


 3.7 g/dL


(3.4-5.0)


 


Albumin/Globulin Ratio 0.8 (1.0-1.7) 








Laboratory Tests








Test


 4/5/21


16:48


 


White Blood Count


 4.9 x10^3/uL


(4.0-11.0)


 


Red Blood Count


 3.19 x10^6/uL


(4.30-5.70)


 


Hemoglobin


 11.2 g/dL


(13.0-17.5)


 


Hematocrit


 33.4 %


(39.0-53.0)


 


Mean Corpuscular Volume


 105 fL


()


 


Mean Corpuscular Hemoglobin 35 pg (25-35) 


 


Mean Corpuscular Hemoglobin


Concent 34 g/dL


(31-37)


 


Red Cell Distribution Width


 15.8 %


(11.5-14.5)


 


Platelet Count


 118 x10^3/uL


(140-400)


 


Neutrophils (%) (Auto) 64 % (31-73) 


 


Lymphocytes (%) (Auto) 24 % (24-48) 


 


Monocytes (%) (Auto) 10 % (0-9) 


 


Eosinophils (%) (Auto) 2 % (0-3) 


 


Basophils (%) (Auto) 1 % (0-3) 


 


Neutrophils # (Auto)


 3.2 x10^3/uL


(1.8-7.7)


 


Lymphocytes # (Auto)


 1.2 x10^3/uL


(1.0-4.8)


 


Monocytes # (Auto)


 0.5 x10^3/uL


(0.0-1.1)


 


Eosinophils # (Auto)


 0.1 x10^3/uL


(0.0-0.7)


 


Basophils # (Auto)


 0.0 x10^3/uL


(0.0-0.2)


 


Sodium Level


 141 mmol/L


(136-145)


 


Potassium Level


 4.1 mmol/L


(3.5-5.1)


 


Chloride Level


 100 mmol/L


()


 


Carbon Dioxide Level


 32 mmol/L


(21-32)


 


Anion Gap 9 (6-14) 


 


Blood Urea Nitrogen


 27 mg/dL


(8-26)


 


Creatinine


 6.8 mg/dL


(0.7-1.3)


 


Estimated GFR


(Cockcroft-Gault) 9.8 





 


BUN/Creatinine Ratio 4 (6-20) 


 


Glucose Level


 78 mg/dL


(70-99)


 


Lactic Acid Level


 1.6 mmol/L


(0.4-2.0)


 


Calcium Level


 8.6 mg/dL


(8.5-10.1)


 


Total Bilirubin


 0.4 mg/dL


(0.2-1.0)


 


Aspartate Amino Transf


(AST/SGOT) 14 U/L (15-37) 





 


Alanine Aminotransferase


(ALT/SGPT) 21 U/L (16-63) 





 


Alkaline Phosphatase


 105 U/L


()


 


Total Protein


 8.3 g/dL


(6.4-8.2)


 


Albumin


 3.7 g/dL


(3.4-5.0)


 


Albumin/Globulin Ratio 0.8 (1.0-1.7) 











Images


Images





[facility] Accession Number: [id number]


.                                                                01


Material submitted:                                        .


vertebral column - T11-T12 DISC BX


.                                                                01


Clinical history:                                          .


Discitis


.                                                                02


**********************************************************************


Diagnosis:


Soft tissue "T11-T12 disc", needle biopsy:


  - Hyaline cartilage with marked acute and chronic inflammation and


     necrosis, consistent with discitis.


  - Chronic inflammation involving synovium.


  - Negative for malignancy.


LBQ  04/24/2020  0953 Local


**********************************************************************


.                                                                02


Comment:


The diagnosis is conveyed to Dr. Aly Crook on 4/23/2020 at approximately


1710 via telephone.


(MLK/db; 4/23/2020)


.                                                                02


Electronically signed:                                     .


Francois Rcihmond MD, Pathologist


NPI- [id number]


.                                                                01


Gross description:                                         .


The specimen is received in formalin, labeled "Julian Cruz, T11-T12 disc


biopsy".  Received is a single needle core of pale tan soft tissue


measuring 1.5 cm in length by 0.1 cm in diameter.  The specimen is


submitted entirely in cassette A1.


(CAA; 4/22/2020)


QA/[facility]  04/22/2020  1619 Local


PROCEDURE


After obtaining informed consent, patient underwent transesophageal echo in the 

PACU.


Type of Sedation : General Anesthesia


Sedation was administered by Mohsen Carrillo CRNA. 


Sedation was achieved with Propofol 100 mg intravenously. 


Transesophageal probe was inserted and advanced into esophagus by Zuhair Bernstein MD.


The MADALYN was performed without complications. 


Throughout the procedure, the blood pressure, pulse oximetry, cardiac rhythm, 

and rate were monitored.


The patient tolerated the procedure without adverse effects. Recovery from 

general anesthesia was uneventful and vital signs were stable.





 LEFT VENTRICLE 


The Left Ventricle is moderately dilated. There is normal left ventricular wall 

thickness. The systolic function is mildly to moderately impaired. EF 40% There 

is moderate global hypokinesis. No left ventricle thrombus noted on this study. 

There is no ventricular septal defect visualized. There is no left ventricular 

aneurysm. 





 RIGHT VENTRICLE 


The right ventricle is normal size. The right ventricle is mildly to moderately 

hypertrophied. The right ventricular systolic function is normal.





 ATRIA 


The left atrium is moderately dilated. The right atrium is moderately dilated. 

The interatrial septum is intact with no evidence for an atrial septal defect or

 patent foramen ovale as noted on 2-D or Doppler imaging. The RENETTA is not well 

visualized. 





 AORTIC VALVE 


The aortic valve is normal in structure and function. Doppler and Color Flow 

revealed no significant aortic regurgitation. There is no significant aortic 

valvular stenosis.





 MITRAL VALVE 


The mitral valve is normal in structure and function. There is no evidence of 

mitral valve prolapse. There is no mitral valve stenosis. Doppler and Color-flow

 revealed trace to mild mitral regurgitation.





 TRICUSPID VALVE 


The tricuspid valve is normal in structure and function. Doppler and Color Flow 

revealed mild tricuspid regurgitation. There is a mobile mass likely attached to

 the septal leaflet of the tricuspid valve measuring approximately 1.2 x 1.2 cm.

 Due to the eccentric nature of the mass, exact dimensions are difficult to 

determine. 





 PULMONIC VALVE 


The pulmonic valve is not well visualized.





 GREAT VESSELS 


The aortic root is normal in size. The ascending aorta is normal in size. The 

IVC is normal in size and collapses >50% with inspiration.





 PERICARDIAL EFFUSION 


There is no pleural effusion.





Critical Notification


Critical Value: No





<Conclusion>


The systolic function is mildly to moderately impaired. EF 40%


There is moderate global hypokinesis.


There is a mobile mass likely attached to the septal leaflet of the tricuspid 

valve measuring approximately 1.2 x 1.2 cm. Due to the eccentric nature of the 

mass, exact dimensions are difficult to determine.





Signed by : Zuhair Bernstein, 


Electronically Approved : 06/04/2020 09:27:06














DICTATED and SIGNED BY:     ZUHAIR BERNSTEIN MD


Exam: CT the thoracic and lumbar spine without contrast


 


INDICATION: Pain


 


TECHNIQUE: Sequential axial images through the thoracic and lumbar spine 


obtained without IV contrast. Sagittal and coronal reformatted images were


reconstructed from the axial data and reviewed.


 


Comparisons: 5/31/2020


 


FINDINGS:


 


Thoracic spine:


There is straightening of the thoracic spine similar compared to prior 


study. Osseous interbody fusion at C1-C2 with wedging is noted.


 


There is redemonstration of erosive changes at T11-T12 which overall 


appears similar when compared to the prior exam.


 


Acute fracture in the thoracic spine is not identified.


 


There remains mild paraspinal soft tissue at the T11-T12 level which is 


similar compared to prior study.


 


Lumbar spine:


Vertebral body heights and alignment are well-maintained.


 


Fracture to the lumbar spine is not identified.


 


There is redemonstration of multilevel degenerative change in the lumbar 


spine with degenerative disc disease greatest at L2-L3, L3-L4 and L5-S1. 


Mild bilateral facet arthropathy is noted in the lumbar spine.


 


Visualized paraspinal soft tissues are unremarkable.


 


IMPRESSION:


1.  Again seen are findings of discitis osteomyelitis at T11-T12 disc 


space with mild adjacent perivertebral extension which is not 


significantly changed compared to the prior study.


2.  Multilevel spondylotic change in the lumbar spine, again similar when 


compared to prior study.


 


 


Exposure: One or more of the following in the visualized dose reduction 


techniques were utilized for this examination:


1.  Automated exposure control


2.  Adjustment of the MA and/or KV according to patient size


3.  Use of iterative of reconstructive technique


 


Electronically signed by: Fozia Ellis MD (7/17/2020 9:19 PM) [id number]














DICTATED and SIGNED BY:     FOZIA ELLIS MD


DATE:     07/17/20 2119





VTE Prophylaxis Ordered


VTE Prophylaxis Devices:  No


VTE Pharmacological Prophylaxi:  Yes





Assessment/Plan


Assessment/Plan


IMPRESSION:








1.  hx discitis osteomyelitis at T11-T12 disc, concern for active infection


2.  intractable back pain


3. low grade fever


4. diabetes


5. hypertension


6. morbid obesity


7. 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


8. 6-20 mildly to moderately impaired. EF 40% moderate global hypokinesis. There

 WAS mobile mass likely attached to the septal leaflet of the tricuspid valve 

measuring approximately 1.2 x 1.2 cm











plan===============








admit


blood culture


crp


procalcitonin =0.17 


emperic iv antibiotics


ID CONSULT 


accuchecks


dvt prophylaxis  sq heparin


need ct report from [facility]


CARDIOLOGY CONSULT











75 MIN pt exam, chart review, > 50% of time spent with exam, chart review, pt 

care coordination





Justifications for Admission


Other Justification














COLLINS OWUSU MD           Apr 5, 2021 19:06

## 2021-04-06 VITALS — SYSTOLIC BLOOD PRESSURE: 130 MMHG | DIASTOLIC BLOOD PRESSURE: 75 MMHG

## 2021-04-06 VITALS — DIASTOLIC BLOOD PRESSURE: 69 MMHG | SYSTOLIC BLOOD PRESSURE: 134 MMHG

## 2021-04-06 VITALS — SYSTOLIC BLOOD PRESSURE: 142 MMHG | DIASTOLIC BLOOD PRESSURE: 78 MMHG

## 2021-04-06 VITALS — SYSTOLIC BLOOD PRESSURE: 138 MMHG | DIASTOLIC BLOOD PRESSURE: 74 MMHG

## 2021-04-06 VITALS — DIASTOLIC BLOOD PRESSURE: 63 MMHG | SYSTOLIC BLOOD PRESSURE: 119 MMHG

## 2021-04-06 VITALS — DIASTOLIC BLOOD PRESSURE: 80 MMHG | SYSTOLIC BLOOD PRESSURE: 140 MMHG

## 2021-04-06 PROCEDURE — 5A1D70Z PERFORMANCE OF URINARY FILTRATION, INTERMITTENT, LESS THAN 6 HOURS PER DAY: ICD-10-PCS | Performed by: INTERNAL MEDICINE

## 2021-04-06 RX ADMIN — DOCUSATE SODIUM SCH MG: 100 CAPSULE, LIQUID FILLED ORAL at 08:24

## 2021-04-06 RX ADMIN — INSULIN LISPRO SCH UNITS: 100 INJECTION, SOLUTION INTRAVENOUS; SUBCUTANEOUS at 15:13

## 2021-04-06 RX ADMIN — HEPARIN SODIUM SCH UNIT: 5000 INJECTION, SOLUTION INTRAVENOUS; SUBCUTANEOUS at 06:07

## 2021-04-06 RX ADMIN — SEVELAMER CARBONATE SCH MG: 800 TABLET, FILM COATED ORAL at 16:46

## 2021-04-06 RX ADMIN — HEPARIN SODIUM SCH UNIT: 5000 INJECTION, SOLUTION INTRAVENOUS; SUBCUTANEOUS at 14:09

## 2021-04-06 RX ADMIN — OXYCODONE HYDROCHLORIDE AND ACETAMINOPHEN PRN TAB: 5; 325 TABLET ORAL at 11:14

## 2021-04-06 RX ADMIN — INSULIN LISPRO SCH UNITS: 100 INJECTION, SOLUTION INTRAVENOUS; SUBCUTANEOUS at 11:15

## 2021-04-06 RX ADMIN — ASPIRIN SCH MG: 81 TABLET, COATED ORAL at 08:24

## 2021-04-06 RX ADMIN — SEVELAMER CARBONATE SCH MG: 800 TABLET, FILM COATED ORAL at 11:14

## 2021-04-06 RX ADMIN — HEPARIN SODIUM SCH UNIT: 5000 INJECTION, SOLUTION INTRAVENOUS; SUBCUTANEOUS at 22:06

## 2021-04-06 RX ADMIN — SEVELAMER CARBONATE SCH MG: 800 TABLET, FILM COATED ORAL at 08:24

## 2021-04-06 RX ADMIN — OXYCODONE HYDROCHLORIDE SCH MG: 10 TABLET, FILM COATED, EXTENDED RELEASE ORAL at 08:25

## 2021-04-06 RX ADMIN — INSULIN LISPRO SCH UNITS: 100 INJECTION, SOLUTION INTRAVENOUS; SUBCUTANEOUS at 08:00

## 2021-04-06 RX ADMIN — SIMVASTATIN SCH MG: 10 TABLET, FILM COATED ORAL at 20:36

## 2021-04-06 RX ADMIN — OXYCODONE HYDROCHLORIDE AND ACETAMINOPHEN PRN TAB: 5; 325 TABLET ORAL at 03:26

## 2021-04-06 RX ADMIN — OXYCODONE HYDROCHLORIDE SCH MG: 10 TABLET, FILM COATED, EXTENDED RELEASE ORAL at 20:36

## 2021-04-06 NOTE — DS
DATE OF DISCHARGE:  04/06/2021



ADMISSION DIAGNOSIS:  Chronic diskitis with chronic intractable back pain.



DISCHARGE DIAGNOSES:  Resolving chronic back pain, end-stage renal disease,

diabetes, hypertension and obesity.



CONSULTS:  Infectious Disease and Cardiology.



PROCEDURES:  None.



HOSPITAL COURSE:  The patient is a pleasant middle-aged male who apparently is

in the workup for a renal transplant at [facility].  Basically admitted the

patient with some back pain.  He had a history of recent diskitis.  I guess

during his renal transplant workup, they did an imaging study with a CAT scan to

survey his body and found some diskitis.  When he presented to the ER, there was

some concern he could have an infection.  He did have a slight fever of 99.6. 

Today, I saw and examined the patient.  He is requesting to go home. 

Clinically, he looks great.  I discussed the case with the  and the

nurse and Dr. Crook of Infectious Disease.  We are going to get his old records

and if those are okay and his vital signs remained stable, going to discharge

this afternoon.



DISPOSITION:  Home.



ACTIVITY:  As tolerated.



DIET:  Low sodium.



MEDICATIONS:  Please see the MRAD.  In his home meds, which include aspirin 81 a

day, docusate, gabapentin 300 t.i.d., oxycodone 10/325 one q. 6 hours p.r.n. and

I gave him a prescription for 30 of those, polyethylene glycol p.r.n. for

constipation, Renvela 1 tab t.i.d., simvastatin 10 a day, vitamin A and I gave

him prescription for insulin 10 units t.i.d., NovoLog with meals, gabapentin 100

at bedtime.



TOTAL TIME:  36 minutes.

 



______________________________

CYNTHIA KAPOOR DO



DR:  RYLAND/yuniel  JOB#:  [id number] / [id number]

DD:  04/06/2021 11:32  DT:  04/06/2021 11:58

## 2021-04-06 NOTE — PDOC
Infectious Disease Note


Vital Sign


Vital Signs





Vital Signs








  Date Time  Temp Pulse Resp B/P (MAP) Pulse Ox O2 Delivery O2 Flow Rate FiO2


 


4/6/21 08:07      Room Air  


 


4/6/21 07:00 97.9 64 18 119/63 (81) 95   





 97.9       











Labs


Lab





Laboratory Tests








Test


 4/5/21


16:48 4/5/21


19:53 4/6/21


07:15


 


White Blood Count


 4.9 x10^3/uL


(4.0-11.0) 


 





 


Red Blood Count


 3.19 x10^6/uL


(4.30-5.70) 


 





 


Hemoglobin


 11.2 g/dL


(13.0-17.5) 


 





 


Hematocrit


 33.4 %


(39.0-53.0) 


 





 


Mean Corpuscular Volume


 105 fL


() 


 





 


Mean Corpuscular Hemoglobin 35 pg (25-35)   


 


Mean Corpuscular Hemoglobin


Concent 34 g/dL


(31-37) 


 





 


Red Cell Distribution Width


 15.8 %


(11.5-14.5) 


 





 


Platelet Count


 118 x10^3/uL


(140-400) 


 





 


Neutrophils (%) (Auto) 64 % (31-73)   


 


Lymphocytes (%) (Auto) 24 % (24-48)   


 


Monocytes (%) (Auto) 10 % (0-9)   


 


Eosinophils (%) (Auto) 2 % (0-3)   


 


Basophils (%) (Auto) 1 % (0-3)   


 


Neutrophils # (Auto)


 3.2 x10^3/uL


(1.8-7.7) 


 





 


Lymphocytes # (Auto)


 1.2 x10^3/uL


(1.0-4.8) 


 





 


Monocytes # (Auto)


 0.5 x10^3/uL


(0.0-1.1) 


 





 


Eosinophils # (Auto)


 0.1 x10^3/uL


(0.0-0.7) 


 





 


Basophils # (Auto)


 0.0 x10^3/uL


(0.0-0.2) 


 





 


Sodium Level


 141 mmol/L


(136-145) 


 





 


Potassium Level


 4.1 mmol/L


(3.5-5.1) 


 





 


Chloride Level


 100 mmol/L


() 


 





 


Carbon Dioxide Level


 32 mmol/L


(21-32) 


 





 


Anion Gap 9 (6-14)   


 


Blood Urea Nitrogen


 27 mg/dL


(8-26) 


 





 


Creatinine


 6.8 mg/dL


(0.7-1.3) 


 





 


Estimated GFR


(Cockcroft-Gault) 9.8 


 


 





 


BUN/Creatinine Ratio 4 (6-20)   


 


Glucose Level


 78 mg/dL


(70-99) 


 





 


Lactic Acid Level


 1.6 mmol/L


(0.4-2.0) 


 





 


Calcium Level


 8.6 mg/dL


(8.5-10.1) 


 





 


Total Bilirubin


 0.4 mg/dL


(0.2-1.0) 


 





 


Aspartate Amino Transf


(AST/SGOT) 14 U/L (15-37) 


 


 





 


Alanine Aminotransferase


(ALT/SGPT) 21 U/L (16-63) 


 


 





 


Alkaline Phosphatase


 105 U/L


() 


 





 


C-Reactive Protein,


Quantitative 3.3 mg/L


(0-3.3) 


 





 


Total Protein


 8.3 g/dL


(6.4-8.2) 


 





 


Albumin


 3.7 g/dL


(3.4-5.0) 


 





 


Albumin/Globulin Ratio 0.8 (1.0-1.7)   


 


Procalcitonin


 0.17 ng/mL


(0.00-0.10) 


 





 


Glucose (Fingerstick)


 


 117 mg/dL


(70-99) 196 mg/dL


(70-99)











Objective


Assessment


pt seen, consult dictated





Plan


Plan of Care


//











SALOME CRUZ MD                Apr 6, 2021 09:20

## 2021-04-06 NOTE — CONS
DATE OF CONSULTATION:  04/06/2021



REQUESTING PHYSICIAN:  Jerald Dan MD



REASON FOR CONSULTATION:  Possible diskitis.



HISTORY OF PRESENT ILLNESS:  This is a 70-year-old -American gentleman,

who is very well known to me.  The patient has end-stage renal disease, on

hemodialysis.  The patient in fact undergoing kidney transplant workup by [facility], part of that they ordered a CT scan of the abdomen according to the

patient and they saw something in the spine looking like possible infection;

hence, they called him and he came into the Emergency Room and got admitted. 

The patient does not have any fever, does not have any more back pain than his

usual.  He does have back pain since his last diagnosis of diskitis, and

clearly, pain has improved compared to when he had diskitis, but he still has to

take some pain medication and he is able to function; and off and on, some days

are worse than the other, but he clearly did not go to the [facility] for back

pain or in any other place.  His visit was just for his transplant workup.  What

appears to be from the ER note that they may have obtained MRI or CT report from

[facility], although I am not able to find it in the chart here.  The patient

has no fever or chills, no incontinence, no other complaints or problems.  He is

just, again, undergoing transplant workup.



PAST MEDICAL HISTORY:  Positive for T11-T12 diskitis with osteomyelitis in

04/2020 and had a paraspinal infection.  The patient had a disk aspiration and

culture done, which turned out to be negative, but he did have a blood culture

positive with Staphylococcus lugdunensis on 04/12; hence, the patient was

treated with cefazolin for 6 weeks.  The patient also has coronary artery

disease with coronary artery bypass grafting in the past, congestive heart

failure, COPD, diabetes, hypertension, end-stage renal disease, on hemodialysis,

and he has had MSSA diskitis in the neck in 2018.



SOCIAL HISTORY:  Negative for smoking, alcohol or illicit drug use.



ALLERGIES:  No known drug allergies.



CURRENT MEDICATIONS:  The patient was put on Zosyn that yesterday I

discontinued.



REVIEW OF SYSTEMS:  As per HPI, all other systems reviewed and are negative.



PHYSICAL EXAMINATION:

GENERAL:  Alert, oriented gentleman, not in distress.

VITAL SIGNS:  Stable with temperature 97.9, pulse 64, respirations 18, blood

pressure 119/63.

HEENT:  Both pupils are round and reacting.  No conjunctival lesion.  No lesion

in the mouth.

NECK:  Supple, no JVP, no lymphadenopathy.

LUNGS:  Clear.

HEART:  S1, S2, regular.

ABDOMEN:  Soft, nontender.  No organomegaly.

EXTREMITIES:  No edema or cyanosis.

SKIN:  Unremarkable.

NEUROLOGICAL:  The patient is alert, awake and appropriate.  No focal neurologic

deficit.



LABORATORY DATA:  White count is 4.9, platelets are 118,000.  BUN and creatinine

are 27 and 6.8.  He had negative blood cultures in May of last year.  Blood

cultures from the ER from yesterday evening are pending.



IMAGING STUDIES:  His last CT done in this hospital was in 07/2020, which had

shown obviously the changes of diskitis and spinal infection.



IMPRESSION:  Abnormal CT abdomen as a part of a workup for the kidney

transplant, probably just showing the old diskitis and osteomyelitis with

changes in the disk and spine that is probably visible.  He has no other

evidence for any new reactivation or infection.  In fact, we do not have the

report from the [facility] to make sure there is no fluid collection, which

would make that this is a reactivation.  If he does have it, of course, he needs

workup.  Without the CT or MRI report, I still offered him the only way to 100%

know there is no active bacteria is to put a needle and get the sample.  The

patient absolutely refused that.  We are waiting for the CT or MRI whatever was

done by the [facility] to review that.  The patient does not need any

antibiotics right now.  We tried to call and talk to transplant coordinator, but

he ended up leaving a message that he wanted me to talk to them.  I will be

happy to talk to them.



Thank you very much, Dr. Dan, for giving me the opportunity to participate

in this patient's care.

 



______________________________

SALOME CRUZ MD



DR:  SULLY/yuniel  JOB#:  [id number] / [id number]

DD:  04/06/2021 09:58  DT:  04/06/2021 11:38

## 2021-04-06 NOTE — PDOC2
ANTHONY MORRIS APRN 4/6/21 1059:


CARDIAC CONSULT


DATE OF CONSULT


Date of Consult


DATE: 4/6/21 


TIME: 10:47





REASON FOR CONSULT


Reason for Consult:


Possible endocarditis





REFERRING PHYSICIAN


Referring Physician:


Dr. Dan





SOURCE


Source:  Chart review, Patient





HISTORY OF PRESENT ILLNESS


HISTORY OF PRESENT ILLNESS


This is a 69 yo male, with a history of diskitis, who presented secondary to 

worsening back pain with concerns for infection. Patient has undergone workup 

for potential renal transplant through [facility]. Underwent CT scan of the 

abdomen and pelvis. Was notified late last week that he could have an infection 

in his lower back. Was seen in ED over the weekend, but refused admission to the

hospital as it was a Holiday weekend. Patient returns to the ED with increasing 

pain and low grade fevers.  Additional history of endocarditis with mobile mass 

likely attached to the septal leaflet of the tricuspid valve measuring 

approximately 1.2 x 1.2 cm via MADALYN 6/3/20. Patient has not had followup 

echocardiogram. 


Reports chronic back pain for the last year. No chest pain, palpitations, 

dizziness, diaphoresis, or SOA.





PAST MEDICAL HISTORY


Past Medical History


Cardiovascular:  CAD, CHF, HTN, cardiomyopathy, endocarditis


Pulmonary:  COPD, Other (ESTEBAN)


GI:  GERD, GI bleed, Gastritis


Hematology anemia


Musculoskeletal:  Osteoarthritis


Infectious disease:  Other (T11-12 diskitis, osteomyelitis, and paraspinal 

infection )


Renal/:  Chronic renal failure (ESRD on HD), Benign prostatic enlarg.


Endocrine:  Diabetes





PAST SURGICAL HISTORY


Past Surgical History


Appendectomy, CABG, Other (right shoulder arthroplasty; right middle and lower 

lobectomy), PD cath placement and removal, EGD, HD placement





FAMILY HISTORY


Family History:  Diabetes





SOCIAL HISTORY


Social History


Smoke:  Quit


ALCOHOL:  none


Drugs:  None


Lives:  with Family





CURRENT MEDICATIONS


CURRENT MEDICATIONS





Current Medications








 Medications


  (Trade)  Dose


 Ordered  Sig/Anna


 Route


 PRN Reason  Start Time


 Stop Time Status Last Admin


Dose Admin


 


 Morphine Sulfate


  (Morphine


 Sulfate)  4 mg  1X  ONCE


 IV


   4/5/21 16:30


 4/5/21 16:32 DC 4/5/21 17:09





 


 Ondansetron HCl


  (Zofran)  4 mg  1X  ONCE


 IV


   4/5/21 16:30


 4/5/21 16:32 DC 4/5/21 17:09





 


 Heparin Sodium


  (Porcine)


  (Heparin Sodium)  5,000 unit  Q8HRS


 SQ


   4/5/21 22:00


    4/6/21 06:07





 


 Aspirin


  (Ecotrin)  81 mg  DAILY08


 PO


   4/6/21 08:00


    4/6/21 08:24





 


 Docusate Sodium


  (Colace)  100 mg  DAILY


 PO


   4/6/21 09:00


    4/6/21 08:24





 


 Oxycodone HCl


  (OxyCONTIN)  10 mg  BID


 PO


   4/5/21 21:45


    4/6/21 08:25





 


 Sevelamer


 Carbonate


  (Renvela)  800 mg  TIDWMEALS


 PO


   4/6/21 08:00


    4/6/21 08:24





 


 Simvastatin


  (Zocor)  10 mg  QHS


 PO


   4/5/21 21:45


    4/5/21 21:55





 


 Oxycodone/


 Acetaminophen


  (Percocet 5/325)  2 tab  PRN TID  PRN


 PO


 PAIN SEVERE  4/5/21 21:45


    4/6/21 03:26














ALLERGIES


ALLERGIES:  


Coded Allergies:  


     No Known Drug Allergies (Unverified , 4/22/20)





ROS


Review of System


14 point ROS conducted with pertinent positives noted above in HPI





PHYSICAL EXAM


PHYSICAL EXAM


General:  Alert, Oriented X3, Cooperative, No acute distress


HEENT:  Atraumatic, Mucous membr. moist/pink


Lungs:  Clear to auscultation, Normal air movement


Heart:  Regular rate (not on tele), Other (3/6 systolic murmur to LLS border)


Abdomen:  Soft, No tenderness


Extremities:  No cyanosis


Skin:  No breakdown, No significant lesion


Neuro:  Normal speech, Sensation intact


Psych/Mental Status:  Mental status NL, Mood NL


MUSCULOSKELETAL:  Osteoarthritic changes both hands





VITALS/I&O


VITALS/I&O:





                                   Vital Signs








  Date Time  Temp Pulse Resp B/P (MAP) Pulse Ox O2 Delivery O2 Flow Rate FiO2


 


4/6/21 08:07      Room Air  


 


4/6/21 07:00 97.9 64 18 119/63 (81) 95   





 97.9       














                                    I & O   


 


 4/5/21 4/5/21 4/6/21





 15:00 23:00 07:00


 


Intake Total   640 ml


 


Output Total   0 ml


 


Balance   640 ml











LABS


Lab:





                                Laboratory Tests








Test


 4/5/21


16:48 4/5/21


19:53 4/6/21


07:15


 


White Blood Count


 4.9 x10^3/uL


(4.0-11.0) 


 





 


Red Blood Count


 3.19 x10^6/uL


(4.30-5.70)  L 


 





 


Hemoglobin


 11.2 g/dL


(13.0-17.5)  L 


 





 


Hematocrit


 33.4 %


(39.0-53.0)  L 


 





 


Mean Corpuscular Volume


 105 fL


()  H 


 





 


Mean Corpuscular Hemoglobin 35 pg (25-35)    


 


Mean Corpuscular Hemoglobin


Concent 34 g/dL


(31-37) 


 





 


Red Cell Distribution Width


 15.8 %


(11.5-14.5)  H 


 





 


Platelet Count


 118 x10^3/uL


(140-400)  L 


 





 


Neutrophils (%) (Auto) 64 % (31-73)    


 


Lymphocytes (%) (Auto) 24 % (24-48)    


 


Monocytes (%) (Auto) 10 % (0-9)  H  


 


Eosinophils (%) (Auto) 2 % (0-3)    


 


Basophils (%) (Auto) 1 % (0-3)    


 


Neutrophils # (Auto)


 3.2 x10^3/uL


(1.8-7.7) 


 





 


Lymphocytes # (Auto)


 1.2 x10^3/uL


(1.0-4.8) 


 





 


Monocytes # (Auto)


 0.5 x10^3/uL


(0.0-1.1) 


 





 


Eosinophils # (Auto)


 0.1 x10^3/uL


(0.0-0.7) 


 





 


Basophils # (Auto)


 0.0 x10^3/uL


(0.0-0.2) 


 





 


Sodium Level


 141 mmol/L


(136-145) 


 





 


Potassium Level


 4.1 mmol/L


(3.5-5.1) 


 





 


Chloride Level


 100 mmol/L


() 


 





 


Carbon Dioxide Level


 32 mmol/L


(21-32) 


 





 


Anion Gap 9 (6-14)    


 


Blood Urea Nitrogen


 27 mg/dL


(8-26)  H 


 





 


Creatinine


 6.8 mg/dL


(0.7-1.3)  H 


 





 


Estimated GFR


(Cockcroft-Gault) 9.8  


 


 





 


BUN/Creatinine Ratio 4 (6-20)  L  


 


Glucose Level


 78 mg/dL


(70-99) 


 





 


Lactic Acid Level


 1.6 mmol/L


(0.4-2.0) 


 





 


Calcium Level


 8.6 mg/dL


(8.5-10.1) 


 





 


Total Bilirubin


 0.4 mg/dL


(0.2-1.0) 


 





 


Aspartate Amino Transferase


(AST) 14 U/L (15-37)


L 


 





 


Alanine Aminotransferase (ALT)


 21 U/L (16-63)


 


 





 


Alkaline Phosphatase


 105 U/L


() 


 





 


C-Reactive Protein,


Quantitative 3.3 mg/L


(0-3.3) 


 





 


Total Protein


 8.3 g/dL


(6.4-8.2)  H 


 





 


Albumin


 3.7 g/dL


(3.4-5.0) 


 





 


Albumin/Globulin Ratio


 0.8 (1.0-1.7)


L 


 





 


Procalcitonin


 0.17 ng/mL


(0.00-0.10)  H 


 





 


Glucose (Fingerstick)


 


 117 mg/dL


(70-99)  H 196 mg/dL


(70-99)  H





                                Laboratory Tests


4/5/21 16:48








                                Laboratory Tests


4/5/21 16:48











ECHOCARDIOGRAM


ECHOCARDIOGRAM


MADALYN


<Conclusion>


The systolic function is mildly to moderately impaired. EF 40%


There is moderate global hypokinesis.


There is a mobile mass likely attached to the septal leaflet of the tricuspid 

valve measuring approximately 1.2 x 1.2 cm. Due to the eccentric nature of the 

mass, exact dimensions are difficult to determine.





DATE:     06/03/20 1404





STRESS TEST


STRESS TEST


Conclusion


1. Regadenoson cardioisotope stress test showed large infarct involving the base

to mid inferior wall extending into the inferolateral wall with small amount of 

kaity-infarct ischemia.


2. Also seen was small to moderate infarct involving the base to mid 

anterolateral wall small amount of kaity-infarct ischemia.


3. Moderate global left ventricle systolic dysfunction and severe hypokinesis of

the inferior wall with ejection fraction calculated at 32%.


4. Intermediate risk for cardiac events.





DATE:     02/06/18 1401





ASSESSMENT/PLAN


ASSESSMENT/PLAN


1.  Low back pain with h/o osteomyelitis/diskitis of T11-T12. Concerns for 

present infection 


2.  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 

tricuspid valve measuring approximately 1.2 x 1.2 cm via MADALYN 6/3/20


3.  Chronic diastolic/systolic CHF: recent EF at 40%, compensated


4.  CAD: past CABG clinically stable


5.  Hypertension; controlled 


6.  Hyperlipidemia 


7.  ESRD on HD


8.  Diabetes, II





Recommendations 





Will need repeat echo to assess tricuspid valve vegetation


Follow ID recs


Supportive care





CAM LEE MD 4/6/21 1814:


CARDIAC CONSULT


ASSESSMENT/PLAN


ASSESSMENT/PLAN


Patient seen and examined


I agree with our nurse practitioners assessment and plan.


Low back pain with h/o osteomyelitis/diskitis of T11-T12.  Work-up and treatment

as per ID. 


H/o endocarditis/mobile mass likely attached to the septal leaflet of the tri

cuspid valve measuring approximately 1.2 x 1.2 cm via MADALYN 6/3/20.  Continue 

present treatment.  We will recheck an echo.


Chronic diastolic/systolic CHF: recent EF at 40%, compensated.  Repeat echo as 

above.


CAD: past CABG clinically stable


Hypertension; controlled 


Hyperlipidemia.  Continue present treatments and monitor.


ESRD on HD.  As per the renal service.











ANTHONY MORRIS            Apr 6, 2021 10:59


CAM LEE MD             Apr 6, 2021 18:14

## 2021-04-06 NOTE — NUR
SW following. Discussed with RN, pt from home alone, room air, renal diet, independent. Pt 
does dialysis MWF at [facility] (ph: [phone]). RN advised no SW needs at 
this time. SW will continue to follow.

## 2021-04-06 NOTE — PDOC
[employer] PROGRESS NOTE


Date of Service


DOS:


DATE: 4/6/21 


TIME: 11:45





Chief Complaint


Chief Complaint


Intractable back pain


Hx of discitis





History of Present Illness


History of Present Illness


4/6 Patient seen and examined


Per ID, the CT image from [facility] possibly detected previous discitis and do 

not believe he is showing signs of active infection. Working on obtaining report

to confirm. 


Patient sitting up in bed complaining of back pain but otherwise comfortable. 


Discussed with RN


Discussed with social work





Vitals/I&O


Vitals/I&O:





                                   Vital Signs








  Date Time  Temp Pulse Resp B/P (MAP) Pulse Ox O2 Delivery O2 Flow Rate FiO2


 


4/6/21 10:49 98.1 64 18 134/69 (90) 94 Room Air  





 98.1       














                                    I & O   


 


 4/5/21 4/5/21 4/6/21





 15:00 23:00 07:00


 


Intake Total   640 ml


 


Output Total   0 ml


 


Balance   640 ml











Physical Exam


General:  Alert, Oriented X3, Cooperative, mild distress


Heart:  Regular rate, No murmurs


Lungs:  Clear, Other (No wheezes or crackles)


Abdomen:  Normal bowel sounds, Soft, No tenderness


Extremities:  No clubbing, No cyanosis, No edema


Skin:  No rashes, No significant lesion





Labs


Labs:





Laboratory Tests








Test


 4/5/21


16:48 4/5/21


19:53 4/6/21


07:15


 


White Blood Count


 4.9 x10^3/uL


(4.0-11.0) 


 





 


Red Blood Count


 3.19 x10^6/uL


(4.30-5.70) 


 





 


Hemoglobin


 11.2 g/dL


(13.0-17.5) 


 





 


Hematocrit


 33.4 %


(39.0-53.0) 


 





 


Mean Corpuscular Volume


 105 fL


() 


 





 


Mean Corpuscular Hemoglobin 35 pg (25-35)   


 


Mean Corpuscular Hemoglobin


Concent 34 g/dL


(31-37) 


 





 


Red Cell Distribution Width


 15.8 %


(11.5-14.5) 


 





 


Platelet Count


 118 x10^3/uL


(140-400) 


 





 


Neutrophils (%) (Auto) 64 % (31-73)   


 


Lymphocytes (%) (Auto) 24 % (24-48)   


 


Monocytes (%) (Auto) 10 % (0-9)   


 


Eosinophils (%) (Auto) 2 % (0-3)   


 


Basophils (%) (Auto) 1 % (0-3)   


 


Neutrophils # (Auto)


 3.2 x10^3/uL


(1.8-7.7) 


 





 


Lymphocytes # (Auto)


 1.2 x10^3/uL


(1.0-4.8) 


 





 


Monocytes # (Auto)


 0.5 x10^3/uL


(0.0-1.1) 


 





 


Eosinophils # (Auto)


 0.1 x10^3/uL


(0.0-0.7) 


 





 


Basophils # (Auto)


 0.0 x10^3/uL


(0.0-0.2) 


 





 


Sodium Level


 141 mmol/L


(136-145) 


 





 


Potassium Level


 4.1 mmol/L


(3.5-5.1) 


 





 


Chloride Level


 100 mmol/L


() 


 





 


Carbon Dioxide Level


 32 mmol/L


(21-32) 


 





 


Anion Gap 9 (6-14)   


 


Blood Urea Nitrogen


 27 mg/dL


(8-26) 


 





 


Creatinine


 6.8 mg/dL


(0.7-1.3) 


 





 


Estimated GFR


(Cockcroft-Gault) 9.8 


 


 





 


BUN/Creatinine Ratio 4 (6-20)   


 


Glucose Level


 78 mg/dL


(70-99) 


 





 


Lactic Acid Level


 1.6 mmol/L


(0.4-2.0) 


 





 


Calcium Level


 8.6 mg/dL


(8.5-10.1) 


 





 


Total Bilirubin


 0.4 mg/dL


(0.2-1.0) 


 





 


Aspartate Amino Transf


(AST/SGOT) 14 U/L (15-37) 


 


 





 


Alanine Aminotransferase


(ALT/SGPT) 21 U/L (16-63) 


 


 





 


Alkaline Phosphatase


 105 U/L


() 


 





 


C-Reactive Protein,


Quantitative 3.3 mg/L


(0-3.3) 


 





 


Total Protein


 8.3 g/dL


(6.4-8.2) 


 





 


Albumin


 3.7 g/dL


(3.4-5.0) 


 





 


Albumin/Globulin Ratio 0.8 (1.0-1.7)   


 


Procalcitonin


 0.17 ng/mL


(0.00-0.10) 


 





 


Hepatitis B Surface Antigen


 Nonreactive


(Nonreactive) 


 





 


Glucose (Fingerstick)


 


 117 mg/dL


(70-99) 196 mg/dL


(70-99)











Review of Systems


Review of Systems:


Reports back pain, denies weakness





Assessment and Plan


Assessmemt and Plan


Assessment:


1.  hx discitis osteomyelitis at T11-T12 disc, concern for active infection


2.  intractable back pain


3. low grade fever


4. diabetes


5. hypertension


6. morbid obesity


7. 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


8. 6-20 mildly to moderately impaired. EF 40% moderate global hypokinesis. There

WAS mobile mass likely attached to the septal leaflet of the tricuspid valve 

measuring approximately 1.2 x 1.2 cm





Plan:


Discharge to home today


Discontinue IV Zosyn


Accuchecks


DVT prophylaxis  sq heparin


need ct report from [facility]


ID consulted, recommendations appreciated


Cardiology consulted for possible endocarditis, recommendations appreciated





Comment


Review of Relevant


I have reviewed the following items henry (where applicable) has been applied.


Medications:





Current Medications








 Medications


  (Trade)  Dose


 Ordered  Sig/Anna


 Route


 PRN Reason  Start Time


 Stop Time Status Last Admin


Dose Admin


 


 Morphine Sulfate


  (Morphine


 Sulfate)  4 mg  1X  ONCE


 IV


   4/5/21 16:30


 4/5/21 16:32 DC 4/5/21 17:09





 


 Ondansetron HCl


  (Zofran)  4 mg  1X  ONCE


 IV


   4/5/21 16:30


 4/5/21 16:32 DC 4/5/21 17:09





 


 Heparin Sodium


  (Porcine)


  (Heparin Sodium)  5,000 unit  Q8HRS


 SQ


   4/5/21 22:00


    4/6/21 06:07





 


 Aspirin


  (Ecotrin)  81 mg  DAILY08


 PO


   4/6/21 08:00


    4/6/21 08:24





 


 Docusate Sodium


  (Colace)  100 mg  DAILY


 PO


   4/6/21 09:00


    4/6/21 08:24





 


 Oxycodone HCl


  (OxyCONTIN)  10 mg  BID


 PO


   4/5/21 21:45


    4/6/21 08:25





 


 Sevelamer


 Carbonate


  (Renvela)  800 mg  TIDWMEALS


 PO


   4/6/21 08:00


    4/6/21 11:14





 


 Simvastatin


  (Zocor)  10 mg  QHS


 PO


   4/5/21 21:45


    4/5/21 21:55





 


 Oxycodone/


 Acetaminophen


  (Percocet 5/325)  2 tab  PRN TID  PRN


 PO


 PAIN SEVERE  4/5/21 21:45


    4/6/21 11:14














Justifications for Admission


Other Justification


acute discitis











CYNTHIA KAPOOR III DO            Apr 6, 2021 11:57

## 2021-04-06 NOTE — PDOC2
CONSULT


Date of Consult


Date of Consult


DATE: 4/6/21 


TIME: 09:30





Reason for Consult


Reason for Consult:


ESRD





History of Present Illness


Reason for Visit:


Pt is a 71 yo AAM with ESRD on HD presented with c/o   persistent worsening back

pain and hx diskitis  complaining of infection to his low back.  Patient states 

he is in the process of getting a kidney transplant at [facility].  t-

max er 99.6 F 


States he was doing his pre transplant work-up which included a CAT scan of the 

abdomen and pelvis and he was told he could have infection on his back.  He 

states he is here today to get some antibiotics   


His last Dialysis was yesterday . Currently he is sitting up in bed, dressed- 

states he might be going home. Denies any CP or SOB. Has Chr mild nausea. Denies

vomiting, diarrhea. No F/C.





Past Medical History


Cardiovascular:  CAD, HTN, Hyperlipidemia, Other


Pulmonary:  Asthma, Bronchitis, COPD


CENTRAL NERVOUS SYSTEM:  Periperal neuropathy


GI:  GERD, GI bleed, Gastritis


Heme/Onc:  Anemia NOS


Hepatobiliary:  No pertinent hx


Psych:  No pertinent hx


Musculoskeletal:   low back pain


Rheumatologic:  No pertinent hx


Infectious disease:  Other


Renal/:  Chronic renal failure


Endocrine:  Diabetes





Past Surgical History


Past Surgical History:  Cataract Removal, Other





Family History


Family History:  Diabetes, Hypertension





Social History


No


ALCOHOL:  none


Drugs:  None


Lives:  with Family


Domestic Violence:  Neg





Current Problem List


Problem List


Problems


Medical Problems:


(1) Chronic back pain


Status: Acute  











Current Medications


Current Medications





Current Medications


Morphine Sulfate (Morphine Sulfate) 4 mg 1X  ONCE IV  Last administered on 

4/5/21at 17:09;  Start 4/5/21 at 16:30;  Stop 4/5/21 at 16:32;  Status DC


Ondansetron HCl (Zofran) 4 mg 1X  ONCE IV  Last administered on 4/5/21at 17:09; 

Start 4/5/21 at 16:30;  Stop 4/5/21 at 16:32;  Status DC


Piperacillin Sod/ Tazobactam Sod (Zosyn Per Pharmacy) 1 each PRN DAILY  PRN [facility] 

SEE COMMENTS;  Start 4/5/21 at 19:30


Sodium Chloride (Normal Saline Flush) 3 ml QSHIFT  PRN IV AFTER MEDS AND BLOOD 

DRAWS;  Start 4/5/21 at 19:30


Ondansetron HCl (Zofran) 4 mg PRN Q4HRS  PRN IV NAUSEA/VOMITING;  Start 4/5/21 

at 19:30;  Stop 4/5/21 at 21:42;  Status DC


Acetaminophen (Tylenol) 650 mg PRN Q4HRS  PRN PO TEMP OVER 100.4F OR MILD PAIN; 

Start 4/5/21 at 19:30


Docusate Sodium (Colace) 100 mg PRN BID  PRN PO HARD STOOLS;  Start 4/5/21 at 

19:30


Albuterol Sulfate (Ventolin Neb Soln) 2.5 mg PRN Q4HRS  PRN NEB SHORTNESS OF 

BREATH;  Start 4/5/21 at 19:30


Guaifenesin (Robitussin) 200 mg PRN Q4HRS  PRN PO COUGH;  Start 4/5/21 at 19:30


Lorazepam (Ativan) 0.5 mg PRN Q4HRS  PRN PO ANXIETY / AGITATION;  Start 4/5/21 

at 19:30


Piperacillin Sod/ Tazobactam Sod 2.25 gm/Sodium Chloride 50 ml @  100 mls/hr 

Q8HRS IV ;  Start 4/5/21 at 22:00;  Stop 4/5/21 at 19:52;  Status DC


Heparin Sodium (Porcine) (Heparin Sodium) 5,000 unit Q8HRS SQ  Last administered

on 4/6/21at 06:07;  Start 4/5/21 at 22:00


Insulin Human Lispro (HumaLOG) 0-5 UNITS TIDWMEALS SQ ;  Start 4/6/21 at 08:00


Dextrose (Dextrose 50%-Water Syringe) 12.5 gm PRN Q15MIN  PRN IV SEE COMMENTS;  

Start 4/5/21 at 21:00


Aspirin (Ecotrin) 81 mg DAILY08 PO  Last administered on 4/6/21at 08:24;  Start 

4/6/21 at 08:00


Docusate Sodium (Colace) 100 mg DAILY PO  Last administered on 4/6/21at 08:24;  

Start 4/6/21 at 09:00


Gabapentin (Neurontin) 300 mg TID PO ;  Start 4/6/21 at 09:00;  Stop 4/6/21 at 

09:14;  Status DC


Oxycodone HCl (OxyCONTIN) 10 mg BID PO  Last administered on 4/6/21at 08:25;  

Start 4/5/21 at 21:45


Oxycodone/ Acetaminophen (Percocet 5/325) 1 tab PRN TID  PRN PO PAIN MODERATE;  

Start 4/5/21 at 21:45


Polyethylene Glycol (miraLAX PACKET) 17 gm PRN DAILY  PRN PO CONSTIPATION;  

Start 4/5/21 at 21:45


Sevelamer Carbonate (Renvela) 800 mg TIDWMEALS PO  Last administered on 4/6/21at

08:24;  Start 4/6/21 at 08:00


Simvastatin (Zocor) 10 mg QHS PO  Last administered on 4/5/21at 21:55;  Start 

4/5/21 at 21:45


Vitamin A/Vitamin D (Vitamin A & D Ointment) 1 cira PRN Q1HR  PRN TP SKIN 

PROTECTION;  Start 4/5/21 at 21:45


Oxycodone/ Acetaminophen (Percocet 5/325) 2 tab PRN TID  PRN PO PAIN SEVERE Last

administered on 4/6/21at 03:26;  Start 4/5/21 at 21:45


Ondansetron HCl (Zofran) 4 mg PRN Q6HRS  PRN IVP NAUSEA/VOMITING;  Start 4/5/21 

at 21:45


Gabapentin (Neurontin) 100 mg HS PO ;  Start 4/6/21 at 21:00





Active Scripts


Active


Percocet 5-325 Mg Tablet ** (Oxycodone/Acetaminophen) 1 Each Tablet 1-2 Each PO 

PRN TID PRN


     pain


Oxycontin ** (Oxycodone HCl) 10 Mg Tab.er.12h 1 Tab PO BID PRN MDD 2 Tablet(s)


A and D Ointment (Vits A and D/White Pet/Lanolin) 42.5 Gm Oint...g. 1 Cira TP PRN

Q1HR PRN 14 Days


Gabapentin 300 Mg Capsule 300 Mg PO TID 30 Days


Colace (Docusate Sodium) 100 Mg Capsule 100 Mg PO DAILY


Polyethylene Glycol 3350 17 Gm Powd.pack 17 Gm PO PRN DAILY PRN


Reported


Simvastatin 10 Mg Tablet 1 Tab PO QHS


Aspir 81 (Aspirin) 81 Mg Tablet.dr 1 Tab PO DAILY


Renvela (Sevelamer Carbonate) 800 Mg Tablet 1 Tab PO TID





Allergies


Allergies:  


Coded Allergies:  


     No Known Drug Allergies (Unverified , 4/22/20)





ROS


Review of System


   


   Per HPI, rest of the ROS is negative





Physical Exam


Physical Exam





GENERAL:  not in distress.


HEENT:  NAD.


NECK:  Supple, 


LUNGS:  Clear.


HEART:  S1, S2 regular.murmur +


ABDOMEN:  Benign. incisional abdominal hernia+


EXTREMITIES:  No edema or cyanosis.    AV graft in the left upper extremity.


SKIN: No rash  


NEUROLOGIC:   Grossly normal


 No Altman, No SP or CVA tenderness


Vital Signs





Vital Signs








  Date Time  Temp Pulse Resp B/P (MAP) Pulse Ox O2 Delivery O2 Flow Rate FiO2


 


4/6/21 08:07      Room Air  


 


4/6/21 07:00 97.9 64 18 119/63 (81) 95   





 97.9       








Assessment & Plan


ESRD - On HD MWF, switched from PD in 2018  . Last HD was yesterday. Currently 

no indication today  





Hx of  discitis osteomyelitis at T11-T12 disc, concern for active infection- ID 

consulted  





Intractable back pain





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 

valve measuring approximately 1.2 x 1.2 cm via MADALYN 6/3/20





Coronary artery disease- stable  





 Diabetes.





Acute on chronic diastolic/systolic CHF: recent EF at 40%





CAD: past CABG clinically stable





HTN: controlled





Labs


Labs





Laboratory Tests








Test


 4/5/21


16:48 4/5/21


19:53 4/6/21


07:15


 


White Blood Count


 4.9 x10^3/uL


(4.0-11.0) 


 





 


Red Blood Count


 3.19 x10^6/uL


(4.30-5.70) 


 





 


Hemoglobin


 11.2 g/dL


(13.0-17.5) 


 





 


Hematocrit


 33.4 %


(39.0-53.0) 


 





 


Mean Corpuscular Volume


 105 fL


() 


 





 


Mean Corpuscular Hemoglobin 35 pg (25-35)   


 


Mean Corpuscular Hemoglobin


Concent 34 g/dL


(31-37) 


 





 


Red Cell Distribution Width


 15.8 %


(11.5-14.5) 


 





 


Platelet Count


 118 x10^3/uL


(140-400) 


 





 


Neutrophils (%) (Auto) 64 % (31-73)   


 


Lymphocytes (%) (Auto) 24 % (24-48)   


 


Monocytes (%) (Auto) 10 % (0-9)   


 


Eosinophils (%) (Auto) 2 % (0-3)   


 


Basophils (%) (Auto) 1 % (0-3)   


 


Neutrophils # (Auto)


 3.2 x10^3/uL


(1.8-7.7) 


 





 


Lymphocytes # (Auto)


 1.2 x10^3/uL


(1.0-4.8) 


 





 


Monocytes # (Auto)


 0.5 x10^3/uL


(0.0-1.1) 


 





 


Eosinophils # (Auto)


 0.1 x10^3/uL


(0.0-0.7) 


 





 


Basophils # (Auto)


 0.0 x10^3/uL


(0.0-0.2) 


 





 


Sodium Level


 141 mmol/L


(136-145) 


 





 


Potassium Level


 4.1 mmol/L


(3.5-5.1) 


 





 


Chloride Level


 100 mmol/L


() 


 





 


Carbon Dioxide Level


 32 mmol/L


(21-32) 


 





 


Anion Gap 9 (6-14)   


 


Blood Urea Nitrogen


 27 mg/dL


(8-26) 


 





 


Creatinine


 6.8 mg/dL


(0.7-1.3) 


 





 


Estimated GFR


(Cockcroft-Gault) 9.8 


 


 





 


BUN/Creatinine Ratio 4 (6-20)   


 


Glucose Level


 78 mg/dL


(70-99) 


 





 


Lactic Acid Level


 1.6 mmol/L


(0.4-2.0) 


 





 


Calcium Level


 8.6 mg/dL


(8.5-10.1) 


 





 


Total Bilirubin


 0.4 mg/dL


(0.2-1.0) 


 





 


Aspartate Amino Transf


(AST/SGOT) 14 U/L (15-37) 


 


 





 


Alanine Aminotransferase


(ALT/SGPT) 21 U/L (16-63) 


 


 





 


Alkaline Phosphatase


 105 U/L


() 


 





 


C-Reactive Protein,


Quantitative 3.3 mg/L


(0-3.3) 


 





 


Total Protein


 8.3 g/dL


(6.4-8.2) 


 





 


Albumin


 3.7 g/dL


(3.4-5.0) 


 





 


Albumin/Globulin Ratio 0.8 (1.0-1.7)   


 


Procalcitonin


 0.17 ng/mL


(0.00-0.10) 


 





 


Glucose (Fingerstick)


 


 117 mg/dL


(70-99) 196 mg/dL


(70-99)








Laboratory Tests








Test


 4/5/21


16:48 4/5/21


19:53 4/6/21


07:15


 


White Blood Count


 4.9 x10^3/uL


(4.0-11.0) 


 





 


Red Blood Count


 3.19 x10^6/uL


(4.30-5.70) 


 





 


Hemoglobin


 11.2 g/dL


(13.0-17.5) 


 





 


Hematocrit


 33.4 %


(39.0-53.0) 


 





 


Mean Corpuscular Volume


 105 fL


() 


 





 


Mean Corpuscular Hemoglobin 35 pg (25-35)   


 


Mean Corpuscular Hemoglobin


Concent 34 g/dL


(31-37) 


 





 


Red Cell Distribution Width


 15.8 %


(11.5-14.5) 


 





 


Platelet Count


 118 x10^3/uL


(140-400) 


 





 


Neutrophils (%) (Auto) 64 % (31-73)   


 


Lymphocytes (%) (Auto) 24 % (24-48)   


 


Monocytes (%) (Auto) 10 % (0-9)   


 


Eosinophils (%) (Auto) 2 % (0-3)   


 


Basophils (%) (Auto) 1 % (0-3)   


 


Neutrophils # (Auto)


 3.2 x10^3/uL


(1.8-7.7) 


 





 


Lymphocytes # (Auto)


 1.2 x10^3/uL


(1.0-4.8) 


 





 


Monocytes # (Auto)


 0.5 x10^3/uL


(0.0-1.1) 


 





 


Eosinophils # (Auto)


 0.1 x10^3/uL


(0.0-0.7) 


 





 


Basophils # (Auto)


 0.0 x10^3/uL


(0.0-0.2) 


 





 


Sodium Level


 141 mmol/L


(136-145) 


 





 


Potassium Level


 4.1 mmol/L


(3.5-5.1) 


 





 


Chloride Level


 100 mmol/L


() 


 





 


Carbon Dioxide Level


 32 mmol/L


(21-32) 


 





 


Anion Gap 9 (6-14)   


 


Blood Urea Nitrogen


 27 mg/dL


(8-26) 


 





 


Creatinine


 6.8 mg/dL


(0.7-1.3) 


 





 


Estimated GFR


(Cockcroft-Gault) 9.8 


 


 





 


BUN/Creatinine Ratio 4 (6-20)   


 


Glucose Level


 78 mg/dL


(70-99) 


 





 


Lactic Acid Level


 1.6 mmol/L


(0.4-2.0) 


 





 


Calcium Level


 8.6 mg/dL


(8.5-10.1) 


 





 


Total Bilirubin


 0.4 mg/dL


(0.2-1.0) 


 





 


Aspartate Amino Transf


(AST/SGOT) 14 U/L (15-37) 


 


 





 


Alanine Aminotransferase


(ALT/SGPT) 21 U/L (16-63) 


 


 





 


Alkaline Phosphatase


 105 U/L


() 


 





 


C-Reactive Protein,


Quantitative 3.3 mg/L


(0-3.3) 


 





 


Total Protein


 8.3 g/dL


(6.4-8.2) 


 





 


Albumin


 3.7 g/dL


(3.4-5.0) 


 





 


Albumin/Globulin Ratio 0.8 (1.0-1.7)   


 


Procalcitonin


 0.17 ng/mL


(0.00-0.10) 


 





 


Glucose (Fingerstick)


 


 117 mg/dL


(70-99) 196 mg/dL


(70-99)








Review


All relevant outside records, renal labs, imaging studies, telemetry/EKG's were 

reviewed.











SHERRY CHRIS MD                 Apr 6, 2021 09:32

## 2021-04-07 VITALS — DIASTOLIC BLOOD PRESSURE: 71 MMHG | SYSTOLIC BLOOD PRESSURE: 135 MMHG

## 2021-04-07 VITALS — DIASTOLIC BLOOD PRESSURE: 73 MMHG | SYSTOLIC BLOOD PRESSURE: 151 MMHG

## 2021-04-07 VITALS — DIASTOLIC BLOOD PRESSURE: 65 MMHG | SYSTOLIC BLOOD PRESSURE: 118 MMHG

## 2021-04-07 LAB
ANION GAP SERPL CALC-SCNC: 12 MMOL/L (ref 6–14)
BUN SERPL-MCNC: 50 MG/DL (ref 8–26)
CALCIUM SERPL-MCNC: 8.9 MG/DL (ref 8.5–10.1)
CHLORIDE SERPL-SCNC: 99 MMOL/L (ref 98–107)
CO2 SERPL-SCNC: 29 MMOL/L (ref 21–32)
CREAT SERPL-MCNC: 9.8 MG/DL (ref 0.7–1.3)
GFR SERPLBLD BASED ON 1.73 SQ M-ARVRAT: 6.4 ML/MIN
GLUCOSE SERPL-MCNC: 77 MG/DL (ref 70–99)
POTASSIUM SERPL-SCNC: 4.9 MMOL/L (ref 3.5–5.1)
SODIUM SERPL-SCNC: 140 MMOL/L (ref 136–145)

## 2021-04-07 PROCEDURE — 5A1D70Z PERFORMANCE OF URINARY FILTRATION, INTERMITTENT, LESS THAN 6 HOURS PER DAY: ICD-10-PCS | Performed by: INTERNAL MEDICINE

## 2021-04-07 RX ADMIN — SEVELAMER CARBONATE SCH MG: 800 TABLET, FILM COATED ORAL at 08:42

## 2021-04-07 RX ADMIN — INSULIN LISPRO SCH UNITS: 100 INJECTION, SOLUTION INTRAVENOUS; SUBCUTANEOUS at 12:00

## 2021-04-07 RX ADMIN — DOCUSATE SODIUM SCH MG: 100 CAPSULE, LIQUID FILLED ORAL at 08:42

## 2021-04-07 RX ADMIN — SEVELAMER CARBONATE SCH MG: 800 TABLET, FILM COATED ORAL at 12:00

## 2021-04-07 RX ADMIN — HEPARIN SODIUM SCH UNIT: 5000 INJECTION, SOLUTION INTRAVENOUS; SUBCUTANEOUS at 15:08

## 2021-04-07 RX ADMIN — INSULIN LISPRO SCH UNITS: 100 INJECTION, SOLUTION INTRAVENOUS; SUBCUTANEOUS at 08:00

## 2021-04-07 RX ADMIN — ASPIRIN SCH MG: 81 TABLET, COATED ORAL at 08:00

## 2021-04-07 RX ADMIN — ASPIRIN SCH MG: 81 TABLET, COATED ORAL at 08:42

## 2021-04-07 RX ADMIN — HEPARIN SODIUM SCH UNIT: 5000 INJECTION, SOLUTION INTRAVENOUS; SUBCUTANEOUS at 05:53

## 2021-04-07 RX ADMIN — OXYCODONE HYDROCHLORIDE SCH MG: 10 TABLET, FILM COATED, EXTENDED RELEASE ORAL at 08:46

## 2021-04-07 NOTE — NUR
Pt. informed he is on the list to have echo done today and possible d/c home today. Pt. 
upset, states he is ready to go home now. Pt. called Dr. Rios, pt is refusing to have echo 
done, stated he is going home. [facility] contacted manish Avendaño,RN nursing supervisor.  
Jenifer stated she will set up OP echo for pt.

## 2021-04-07 NOTE — PDOC
[employer] PROGRESS NOTE


Date of Service


DOS:


DATE: 4/7/21 


TIME: 09:00





Chief Complaint


Chief Complaint


Intractable back pain


Hx of discitis





History of Present Illness


History of Present Illness


4/7/2021


Patient seen and examined


States he would like to be discharged today


Discussed with RN


Chart reviewed





4/6 Patient seen and examined


Per ID, the CT image from [facility] possibly detected previous discitis and do 

not believe he is showing signs of active infection. Working on obtaining report

to confirm. 


Patient sitting up in bed complaining of back pain but otherwise comfortable. 


Discussed with RN


Discussed with social work





Vitals/I&O


Vitals/I&O:





                                   Vital Signs








  Date Time  Temp Pulse Resp B/P (MAP) Pulse Ox O2 Delivery O2 Flow Rate FiO2


 


4/7/21 07:07 97.8 68 18 151/73 (99) 98 Room Air  





 97.8       














                                    I & O   


 


 4/6/21 4/6/21 4/7/21





 15:00 23:00 07:00


 


Intake Total 700 ml 440 ml 400 ml


 


Output Total  0 ml 0 ml


 


Balance 700 ml 440 ml 400 ml











Physical Exam


General:  Alert, Oriented X3, Cooperative, mild distress


Heart:  Regular rate, No murmurs


Lungs:  Clear, Other (No wheezes or crackles)


Abdomen:  Normal bowel sounds, Soft, No tenderness


Extremities:  No clubbing, No cyanosis, No edema


Skin:  No rashes, No significant lesion





Labs


Labs:





Laboratory Tests








Test


 4/6/21


11:43 4/6/21


16:40 4/6/21


20:54 4/7/21


05:35


 


Glucose (Fingerstick)


 103 mg/dL


(70-99) 120 mg/dL


(70-99) 121 mg/dL


(70-99) 





 


Sodium Level


 


 


 


 140 mmol/L


(136-145)


 


Potassium Level


 


 


 


 4.9 mmol/L


(3.5-5.1)


 


Chloride Level


 


 


 


 99 mmol/L


()


 


Carbon Dioxide Level


 


 


 


 29 mmol/L


(21-32)


 


Anion Gap    12 (6-14) 


 


Blood Urea Nitrogen


 


 


 


 50 mg/dL


(8-26)


 


Creatinine


 


 


 


 9.8 mg/dL


(0.7-1.3)


 


Estimated GFR


(Cockcroft-Gault) 


 


 


 6.4 





 


Glucose Level


 


 


 


 77 mg/dL


(70-99)


 


Calcium Level


 


 


 


 8.9 mg/dL


(8.5-10.1)


 


Test


 4/7/21


07:03 


 


 





 


Glucose (Fingerstick)


 69 mg/dL


(70-99) 


 


 














Review of Systems


Review of Systems:


Reports back pain, denies weakness





Assessment and Plan


Assessmemt and Plan


Assessment:


1.  hx discitis osteomyelitis at T11-T12 disc, concern for active infection


2.  intractable back pain


3. low grade fever


4. diabetes


5. hypertension


6. morbid obesity


7. 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


8. 6-20 mildly to moderately impaired. EF 40% moderate global hypokinesis. There

WAS mobile mass likely attached to the septal leaflet of the tricuspid valve 

measuring approximately 1.2 x 1.2 cm





Plan:


Anticipate re-discharge today if cardiology and ID consults agree


FOR NOW continue the following:


Accuchecks


DVT prophylaxis  sq heparin


Home Meds


Full code





Comment


Review of Relevant


I have reviewed the following items henry (where applicable) has been applied.


Medications:





Current Medications








 Medications


  (Trade)  Dose


 Ordered  Sig/Anna


 Route


 PRN Reason  Start Time


 Stop Time Status Last Admin


Dose Admin


 


 Gabapentin


  (Neurontin)  100 mg  HS


 PO


   4/6/21 21:00


    4/6/21 20:34














Justifications for Admission


Other Justification


acute discitis











CYNTHIA KAPOOR III DO            Apr 7, 2021 09:07

## 2021-04-07 NOTE — PDOC
Infectious Disease Note


Subjective


Subjective


Patient is very happy today he understood what I discussed with him yesterday 

very well.  He does admit to say he feels good and he has no fever no weight 

loss no back pain is actually much better than last year when he had the 

discitis.  He does not feel he has any infection.  And he understood with my 

explanation the x-ray finding or CT finding of discitis/ osteomyelitis the 

changes does not go away that easily or quickly that would confuse the [facility] since he was not seen there and he was treated here.


He wants to go home





ROS


ROS


No nausea vomiting diarrhea or fever





Vital Sign


Vital Signs





Vital Signs








  Date Time  Temp Pulse Resp B/P (MAP) Pulse Ox O2 Delivery O2 Flow Rate FiO2


 


4/7/21 07:07 97.8 68 18 151/73 (99) 98 Room Air  





 97.8       











Physical Exam


PHYSICAL EXAM


GENERAL:  Alert, oriented gentleman, not in distress.


VITAL SIGNS:  Stable 


HEENT:  Both pupils are round and reacting.  No conjunctival lesion.  No lesion


in the mouth.


NECK:  Supple, no JVP, no lymphadenopathy.


LUNGS:  Clear.


HEART:  S1, S2, regular.


ABDOMEN:  Soft, nontender.  No organomegaly.


EXTREMITIES:  No edema or cyanosis.


SKIN:  Unremarkable.


NEUROLOGICAL:  The patient is alert, awake and appropriate.  No focal neurologic


deficit.





Labs


Lab





Laboratory Tests








Test


 4/6/21


11:43 4/6/21


16:40 4/6/21


20:54 4/7/21


05:35


 


Glucose (Fingerstick)


 103 mg/dL


(70-99) 120 mg/dL


(70-99) 121 mg/dL


(70-99) 





 


Sodium Level


 


 


 


 140 mmol/L


(136-145)


 


Potassium Level


 


 


 


 4.9 mmol/L


(3.5-5.1)


 


Chloride Level


 


 


 


 99 mmol/L


()


 


Carbon Dioxide Level


 


 


 


 29 mmol/L


(21-32)


 


Anion Gap    12 (6-14) 


 


Blood Urea Nitrogen


 


 


 


 50 mg/dL


(8-26)


 


Creatinine


 


 


 


 9.8 mg/dL


(0.7-1.3)


 


Estimated GFR


(Cockcroft-Gault) 


 


 


 6.4 





 


Glucose Level


 


 


 


 77 mg/dL


(70-99)


 


Calcium Level


 


 


 


 8.9 mg/dL


(8.5-10.1)


 


Test


 4/7/21


07:03 


 


 





 


Glucose (Fingerstick)


 69 mg/dL


(70-99) 


 


 











Micro





Microbiology


4/5/21 Blood Culture - Preliminary, Resulted


         NO GROWTH AFTER 1 DAY





Objective


Assessment


IMPRESSION:  Abnormal CT abdomen as a part of a workup for the kidney


transplant, probably just showing the old diskitis and osteomyelitis with


changes in the disk and spine that is probably visible.  He has no other


evidence for any new infection or reactivation of infection.  In fact, we do not

have the


report from the [facility] to make sure there is no fluid collection, which


would make that this is a reactivation.  If he does have it, of course, he needs


workup.  Without the CT or MRI report, I still offered him the only way to 100%


know there is no active bacteria is to put a needle and get the sample.  The


patient absolutely refused that.  We are waiting for the CT or MRI whatever was


done by the [facility] to review that.  The patient does not need any


antibiotics right now.  We tried to call and talk to transplant coordinator, but


he ended up leaving a message that he wanted me to talk to them to clarify.  I 

will be


happy to talk to them.





Plan


Plan of Care


We still have not received the [facility] record of that CT or MRI


No antibiotics


Patient can be discharged from the infectious disease standpoint of view I would

still like to get the copy of that CT or MRI to review


Patient to call my office for any questions or concerns











SALOME CRUZ MD                Apr 7, 2021 09:04

## 2021-04-07 NOTE — NUR
SW following. Discussed with RN, pt from home, room air, renal diet. Discharge order for 
home with self care, if okay with consultants. RN following up with consultants. Pt having 
dialysis today. SW will continue to follow.

## 2021-04-07 NOTE — PDOC
DATE OF SERVICE


DATE: 4/7/21 


TIME: 10:34





SUBJECTIVE


ROS


Seen on dialysis, no complaints





OBJECTIVE


Vital Signs





Vital Signs








  Date Time  Temp Pulse Resp B/P (MAP) Pulse Ox O2 Delivery O2 Flow Rate FiO2


 


4/7/21 08:00      Room Air  


 


4/7/21 07:07 97.8 68 18 151/73 (99) 98   





 97.8       








I & 0











Intake and Output 


 


 4/7/21





 07:00


 


Intake Total 1540 ml


 


Output Total 0 ml


 


Balance 1540 ml


 


 


 


Intake Oral 1540 ml


 


Output Urine Total 0 ml











PHYSICAL EXAM


Physical Exam


GENERAL:  not in distress.


HEENT:  NAD.


NECK:  Supple, 


LUNGS:  Clear.


HEART:  S1, S2 regular.murmur +


ABDOMEN:  Benign. incisional abdominal hernia+


EXTREMITIES:  No edema or cyanosis.    AV graft in the left upper extremity.


SKIN: No rash  


NEUROLOGIC:   Grossly normal


 No Altman, No SP or CVA tenderness





DIAGNOSIS/ASSESSMENT


Assessment & Plan





ESRD - On HD MWF, switched from PD in 2018  . Seen on dialysis, tolerating well.

Continue as ordered. [facility] Dialysis RN  





Hx of  discitis osteomyelitis at T11-T12 disc, concern for active infection- ID 

consulted  





Intractable back pain





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 

valve measuring approximately 1.2 x 1.2 cm via MADALYN 6/3/20





Coronary artery disease- stable  





 Diabetes.





Acute on chronic diastolic/systolic CHF: recent EF at 40%





CAD: past CABG clinically stable





HTN: controlled





COMMENT/RELEVANT DATA


Meds





Current Medications








 Medications


  (Trade)  Dose


 Ordered  Sig/Anna  Start Time


 Stop Time Status Last Admin


Dose Admin


 


 Acetaminophen


  (Tylenol)  650 mg  PRN Q4HRS  PRN  4/5/21 19:30


     





 


 Albumin Human  200 ml @ 


 200 mls/hr  1X PRN  PRN  4/7/21 09:00


 4/7/21 14:59   





 


 Albuterol Sulfate


  (Ventolin Neb


 Soln)  2.5 mg  PRN Q4HRS  PRN  4/5/21 19:30


     





 


 Aspirin


  (Ecotrin)  81 mg  DAILY08  4/6/21 08:00


    4/7/21 08:42


81 MG


 


 Dextrose


  (Dextrose


 50%-Water Syringe)  12.5 gm  PRN Q15MIN  PRN  4/5/21 21:00


     





 


 Docusate Sodium


  (Colace)  100 mg  DAILY  4/6/21 09:00


    4/7/21 08:42


100 MG


 


 Gabapentin


  (Neurontin)  100 mg  HS  4/6/21 21:00


    4/6/21 20:34


100 MG


 


 Guaifenesin


  (Robitussin)  200 mg  PRN Q4HRS  PRN  4/5/21 19:30


     





 


 Heparin Sodium


  (Porcine)


  (Heparin Sodium)  5,000 unit  Q8HRS  4/5/21 22:00


    4/7/21 05:53


5,000 UNIT


 


 Info


  (PHARMACY


 MONITORING -- do


 not chart)  1 each  PRN DAILY  PRN  4/7/21 09:00


     





 


 Insulin Human


 Lispro


  (HumaLOG)  0-5 UNITS  TIDWMEALS  4/6/21 08:00


     





 


 Lorazepam


  (Ativan)  0.5 mg  PRN Q4HRS  PRN  4/5/21 19:30


     





 


 Morphine Sulfate


  (Morphine


 Sulfate)  4 mg  1X  ONCE  4/5/21 16:30


 4/5/21 16:32 DC 4/5/21 17:09


4 MG


 


 Ondansetron HCl


  (Zofran)  4 mg  PRN Q6HRS  PRN  4/5/21 21:45


     





 


 Oxycodone HCl


  (OxyCONTIN)  10 mg  BID  4/5/21 21:45


    4/7/21 08:46


10 MG


 


 Oxycodone/


 Acetaminophen


  (Percocet 5/325)  2 tab  PRN TID  PRN  4/5/21 21:45


    4/6/21 11:14


2 TAB


 


 Piperacillin Sod/


 Tazobactam Sod


  (Zosyn Per


 Pharmacy)  1 each  PRN DAILY  PRN  4/5/21 19:30


 4/6/21 10:24 DC  





 


 Piperacillin Sod/


 Tazobactam Sod


 2.25 gm/Sodium


 Chloride  50 ml @ 


 100 mls/hr  Q8HRS  4/5/21 22:00


 4/5/21 19:52 DC  





 


 Polyethylene


 Glycol


  (miraLAX PACKET)  17 gm  PRN DAILY  PRN  4/5/21 21:45


     





 


 Sevelamer


 Carbonate


  (Renvela)  800 mg  TIDWMEALS  4/6/21 08:00


    4/7/21 08:42


800 MG


 


 Simvastatin


  (Zocor)  10 mg  QHS  4/5/21 21:45


    4/6/21 20:36


10 MG


 


 Sodium Chloride  1,000 ml @ 


 400 mls/hr  Q2H30M PRN  4/7/21 09:00


 4/7/21 20:59   





 


 Sodium Chloride


  (Normal Saline


 Flush)  3 ml  QSHIFT  PRN  4/5/21 19:30


     





 


 Vitamin A/Vitamin


 D


  (Vitamin A & D


 Ointment)  1 dioni  PRN Q1HR  PRN  4/5/21 21:45


     











Lab





Laboratory Tests








Test


 4/6/21


11:43 4/6/21


16:40 4/6/21


20:54 4/7/21


05:35


 


Glucose (Fingerstick)


 103 mg/dL


(70-99) 120 mg/dL


(70-99) 121 mg/dL


(70-99) 





 


Erythrocyte Sedimentation Rate    35 (0-15) 


 


Sodium Level


 


 


 


 140 mmol/L


(136-145)


 


Potassium Level


 


 


 


 4.9 mmol/L


(3.5-5.1)


 


Chloride Level


 


 


 


 99 mmol/L


()


 


Carbon Dioxide Level


 


 


 


 29 mmol/L


(21-32)


 


Anion Gap    12 (6-14) 


 


Blood Urea Nitrogen


 


 


 


 50 mg/dL


(8-26)


 


Creatinine


 


 


 


 9.8 mg/dL


(0.7-1.3)


 


Estimated GFR


(Cockcroft-Gault) 


 


 


 6.4 





 


Glucose Level


 


 


 


 77 mg/dL


(70-99)


 


Calcium Level


 


 


 


 8.9 mg/dL


(8.5-10.1)


 


Test


 4/7/21


07:03 


 


 





 


Glucose (Fingerstick)


 69 mg/dL


(70-99) 


 


 











Results


All relevant outside records, renal labs, imaging studies, telemetry/EKG's were 

reviewed.





Justicifation of Admission Dx:


Justifications for Admission:


Justification of Admission Dx:  N/A











SHERRY CHRIS MD                 Apr 7, 2021 10:34

## 2021-04-07 NOTE — PDOC
ANTHONY MORRIS APRN 4/7/21 0935:


CARDIO Progress Notes


Date and Time


Date of Service


4/7/21


Time of Evaluation


0930





Subjective


Subjective:  No Chest Pain, No shortness of breath, No Palpitations


Comments:  seen on HD





Vitals


Vitals





Vital Signs








  Date Time  Temp Pulse Resp B/P (MAP) Pulse Ox O2 Delivery O2 Flow Rate FiO2


 


4/7/21 07:07 97.8 68 18 151/73 (99) 98 Room Air  





 97.8       








Weight


Weight [ ]





Input and Output


Intake and Output











Intake and Output 


 


 4/7/21





 06:59


 


Intake Total 1540 ml


 


Output Total 0 ml


 


Balance 1540 ml


 


 


 


Intake Oral 1540 ml


 


Output Urine Total 0 ml











Laboratory


Labs





Laboratory Tests








Test


 4/6/21


11:43 4/6/21


16:40 4/6/21


20:54 4/7/21


05:35


 


Glucose (Fingerstick)


 103 mg/dL


(70-99) 120 mg/dL


(70-99) 121 mg/dL


(70-99) 





 


Sodium Level


 


 


 


 140 mmol/L


(136-145)


 


Potassium Level


 


 


 


 4.9 mmol/L


(3.5-5.1)


 


Chloride Level


 


 


 


 99 mmol/L


()


 


Carbon Dioxide Level


 


 


 


 29 mmol/L


(21-32)


 


Anion Gap    12 (6-14) 


 


Blood Urea Nitrogen


 


 


 


 50 mg/dL


(8-26)


 


Creatinine


 


 


 


 9.8 mg/dL


(0.7-1.3)


 


Estimated GFR


(Cockcroft-Gault) 


 


 


 6.4 





 


Glucose Level


 


 


 


 77 mg/dL


(70-99)


 


Calcium Level


 


 


 


 8.9 mg/dL


(8.5-10.1)


 


Test


 4/7/21


07:03 


 


 





 


Glucose (Fingerstick)


 69 mg/dL


(70-99) 


 


 














Microbiology


Micro





Microbiology


4/5/21 Blood Culture - Preliminary, Resulted


         NO GROWTH AFTER 1 DAY





Physical Exam


HEENT:  Neck Supple W Full Motion


Chest:  Symmetric


LUNGS:  Clear to Auscultation


Heart:  RRR


Abdomen:  Soft N/T


Extremities:  No Edema


Neurology:  alert, oriented, follow commands





Assessment


Assessment


1.  Low back pain with h/o osteomyelitis/diskitis of T11-T12. Concerns for 

present infection 


2.  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 

tricuspid valve measuring approximately 1.2 x 1.2 cm via MADALYN 6/3/20


3.  Chronic diastolic/systolic CHF: recent EF at 40%, compensated


4.  CAD: past CABG clinically stable


5.  Hypertension; controlled 


6.  Hyperlipidemia 


7.  ESRD on HD


8.  Diabetes, II





Recommendations 





Echo today to re-assess tricuspid valve vegetation


Fluid offloading via HD


Follow ID recs


Supportive care





Justicifation of Admission Dx:


Justifications for Admission:


Justification of Admission Dx:  N/A





CAM LEE MD 4/7/21 1738:


CARDIO Progress Notes


Assessment


Assessment


Patient seen and evaluated


I agree with our nurse practitioners assessment and plan.


Low back pain with h/o osteomyelitis/diskitis of T11-T12.  ID evaluating. 


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 

tricuspid valve measuring approximately 1.2 x 1.2 cm via MADALYN 6/3/20.  Echo.


Chronic diastolic/systolic CHF: recent EF at 40%, compensated.  Continue present

treatment.


CAD: past CABG clinically stable


Hypertension; controlled on present treatment.


Hyperlipidemia.  Continue present treatment.


ESRD on HD as per renal.











ANTHONY MORRIS            Apr 7, 2021 09:35


CAM LEE MD             Apr 7, 2021 17:38

## 2021-11-12 ENCOUNTER — HOSPITAL ENCOUNTER (INPATIENT)
Dept: HOSPITAL 61 - ER | Age: 71
LOS: 1 days | Discharge: HOME | DRG: 189 | End: 2021-11-13
Attending: INTERNAL MEDICINE | Admitting: INTERNAL MEDICINE
Payer: COMMERCIAL

## 2021-11-12 VITALS — SYSTOLIC BLOOD PRESSURE: 158 MMHG | DIASTOLIC BLOOD PRESSURE: 79 MMHG

## 2021-11-12 VITALS — WEIGHT: 225.53 LBS | BODY MASS INDEX: 32.29 KG/M2 | HEIGHT: 70 IN

## 2021-11-12 VITALS — DIASTOLIC BLOOD PRESSURE: 89 MMHG | SYSTOLIC BLOOD PRESSURE: 140 MMHG

## 2021-11-12 DIAGNOSIS — M46.40: ICD-10-CM

## 2021-11-12 DIAGNOSIS — Z20.822: ICD-10-CM

## 2021-11-12 DIAGNOSIS — E78.5: ICD-10-CM

## 2021-11-12 DIAGNOSIS — E87.5: ICD-10-CM

## 2021-11-12 DIAGNOSIS — I13.2: ICD-10-CM

## 2021-11-12 DIAGNOSIS — E11.22: ICD-10-CM

## 2021-11-12 DIAGNOSIS — J96.01: Primary | ICD-10-CM

## 2021-11-12 DIAGNOSIS — N17.9: ICD-10-CM

## 2021-11-12 DIAGNOSIS — J96.02: ICD-10-CM

## 2021-11-12 DIAGNOSIS — J44.1: ICD-10-CM

## 2021-11-12 DIAGNOSIS — Z87.891: ICD-10-CM

## 2021-11-12 DIAGNOSIS — I25.10: ICD-10-CM

## 2021-11-12 DIAGNOSIS — Z82.49: ICD-10-CM

## 2021-11-12 DIAGNOSIS — Z99.2: ICD-10-CM

## 2021-11-12 DIAGNOSIS — R79.1: ICD-10-CM

## 2021-11-12 DIAGNOSIS — K21.9: ICD-10-CM

## 2021-11-12 DIAGNOSIS — G89.29: ICD-10-CM

## 2021-11-12 DIAGNOSIS — Z83.3: ICD-10-CM

## 2021-11-12 DIAGNOSIS — I50.43: ICD-10-CM

## 2021-11-12 DIAGNOSIS — N18.6: ICD-10-CM

## 2021-11-12 DIAGNOSIS — Z95.1: ICD-10-CM

## 2021-11-12 LAB
ALBUMIN SERPL-MCNC: 3.5 G/DL (ref 3.4–5)
ALBUMIN/GLOB SERPL: 0.8 {RATIO} (ref 1–1.7)
ALP SERPL-CCNC: 65 U/L (ref 46–116)
ALT SERPL-CCNC: 15 U/L (ref 16–63)
ANION GAP SERPL CALC-SCNC: 8 MMOL/L (ref 6–14)
AST SERPL-CCNC: 9 U/L (ref 15–37)
BASE EXCESS STD BLDA CALC-SCNC: 2 MMOL/L (ref -3–3)
BASOPHILS # BLD AUTO: 0 X10^3/UL (ref 0–0.2)
BASOPHILS NFR BLD: 0 % (ref 0–3)
BILIRUB SERPL-MCNC: 0.4 MG/DL (ref 0.2–1)
BUN SERPL-MCNC: 33 MG/DL (ref 8–26)
BUN/CREAT SERPL: 4 (ref 6–20)
CALCIUM SERPL-MCNC: 9.1 MG/DL (ref 8.5–10.1)
CHLORIDE SERPL-SCNC: 100 MMOL/L (ref 98–107)
CK SERPL-CCNC: 50 U/L (ref 39–308)
CO2 SERPL-SCNC: 31 MMOL/L (ref 21–32)
CREAT SERPL-MCNC: 9.3 MG/DL (ref 0.7–1.3)
EOSINOPHIL NFR BLD: 0.1 X10^3/UL (ref 0–0.7)
EOSINOPHIL NFR BLD: 1 % (ref 0–3)
ERYTHROCYTE [DISTWIDTH] IN BLOOD BY AUTOMATED COUNT: 16.1 % (ref 11.5–14.5)
GFR SERPLBLD BASED ON 1.73 SQ M-ARVRAT: 6.8 ML/MIN
GLUCOSE SERPL-MCNC: 118 MG/DL (ref 70–99)
HCO3 BLDA-SCNC: 27 MMOL/L (ref 21–28)
HCT VFR BLD CALC: 24.8 % (ref 39–53)
HGB BLD-MCNC: 8.2 G/DL (ref 13–17.5)
INFLUENZA A PATIENT: NEGATIVE
INFLUENZA B PATIENT: NEGATIVE
LIPASE: 36 U/L (ref 73–393)
LYMPHOCYTES # BLD: 0.8 X10^3/UL (ref 1–4.8)
LYMPHOCYTES NFR BLD AUTO: 10 % (ref 24–48)
MAGNESIUM SERPL-MCNC: 2.4 MG/DL (ref 1.8–2.4)
MCH RBC QN AUTO: 35 PG (ref 25–35)
MCHC RBC AUTO-ENTMCNC: 33 G/DL (ref 31–37)
MCV RBC AUTO: 106 FL (ref 79–100)
METHGB MFR BLD: 0.2 % (ref 0–1.9)
MONO #: 0.5 X10^3/UL (ref 0–1.1)
MONOCYTES NFR BLD: 7 % (ref 0–9)
NEUT #: 6.5 X10^3/UL (ref 1.8–7.7)
NEUTROPHILS NFR BLD AUTO: 82 % (ref 31–73)
OXYHGB MFR BLD: 98.4 %
PCO2 BLDA: 47 MMHG (ref 35–46)
PLATELET # BLD AUTO: 135 X10^3/UL (ref 140–400)
PO2 BLDA: 237 MMHG (ref 65–108)
POTASSIUM SERPL-SCNC: 5.2 MMOL/L (ref 3.5–5.1)
PROT SERPL-MCNC: 7.7 G/DL (ref 6.4–8.2)
PROTHROMBIN TIME: 14.2 SEC (ref 11.7–14)
RBC # BLD AUTO: 2.35 X10^6/UL (ref 4.3–5.7)
SAO2 % BLDA: 99 % (ref 92–99)
SODIUM SERPL-SCNC: 139 MMOL/L (ref 136–145)
WBC # BLD AUTO: 8 X10^3/UL (ref 4–11)

## 2021-11-12 PROCEDURE — 87804 INFLUENZA ASSAY W/OPTIC: CPT

## 2021-11-12 PROCEDURE — 83735 ASSAY OF MAGNESIUM: CPT

## 2021-11-12 PROCEDURE — 94660 CPAP INITIATION&MGMT: CPT

## 2021-11-12 PROCEDURE — 85610 PROTHROMBIN TIME: CPT

## 2021-11-12 PROCEDURE — U0003 INFECTIOUS AGENT DETECTION BY NUCLEIC ACID (DNA OR RNA); SEVERE ACUTE RESPIRATORY SYNDROME CORONAVIRUS 2 (SARS-COV-2) (CORONAVIRUS DISEASE [COVID-19]), AMPLIFIED PROBE TECHNIQUE, MAKING USE OF HIGH THROUGHPUT TECHNOLOGIES AS DESCRIBED BY CMS-2020-01-R: HCPCS

## 2021-11-12 PROCEDURE — G0378 HOSPITAL OBSERVATION PER HR: HCPCS

## 2021-11-12 PROCEDURE — 82607 VITAMIN B-12: CPT

## 2021-11-12 PROCEDURE — 87426 SARSCOV CORONAVIRUS AG IA: CPT

## 2021-11-12 PROCEDURE — 83540 ASSAY OF IRON: CPT

## 2021-11-12 PROCEDURE — 80053 COMPREHEN METABOLIC PANEL: CPT

## 2021-11-12 PROCEDURE — 36600 WITHDRAWAL OF ARTERIAL BLOOD: CPT

## 2021-11-12 PROCEDURE — 82553 CREATINE MB FRACTION: CPT

## 2021-11-12 PROCEDURE — 36415 COLL VENOUS BLD VENIPUNCTURE: CPT

## 2021-11-12 PROCEDURE — 82962 GLUCOSE BLOOD TEST: CPT

## 2021-11-12 PROCEDURE — 85025 COMPLETE CBC W/AUTO DIFF WBC: CPT

## 2021-11-12 PROCEDURE — 5A09357 ASSISTANCE WITH RESPIRATORY VENTILATION, LESS THAN 24 CONSECUTIVE HOURS, CONTINUOUS POSITIVE AIRWAY PRESSURE: ICD-10-PCS | Performed by: INTERNAL MEDICINE

## 2021-11-12 PROCEDURE — 94640 AIRWAY INHALATION TREATMENT: CPT

## 2021-11-12 PROCEDURE — 83880 ASSAY OF NATRIURETIC PEPTIDE: CPT

## 2021-11-12 PROCEDURE — 71045 X-RAY EXAM CHEST 1 VIEW: CPT

## 2021-11-12 PROCEDURE — 96374 THER/PROPH/DIAG INJ IV PUSH: CPT

## 2021-11-12 PROCEDURE — 93005 ELECTROCARDIOGRAM TRACING: CPT

## 2021-11-12 PROCEDURE — 87040 BLOOD CULTURE FOR BACTERIA: CPT

## 2021-11-12 PROCEDURE — 82805 BLOOD GASES W/O2 SATURATION: CPT

## 2021-11-12 PROCEDURE — 87340 HEPATITIS B SURFACE AG IA: CPT

## 2021-11-12 PROCEDURE — 83605 ASSAY OF LACTIC ACID: CPT

## 2021-11-12 PROCEDURE — 84484 ASSAY OF TROPONIN QUANT: CPT

## 2021-11-12 PROCEDURE — 83550 IRON BINDING TEST: CPT

## 2021-11-12 PROCEDURE — 83690 ASSAY OF LIPASE: CPT

## 2021-11-12 RX ADMIN — OXYCODONE HYDROCHLORIDE AND ACETAMINOPHEN PRN TAB: 10; 325 TABLET ORAL at 22:17

## 2021-11-12 RX ADMIN — METHYLPREDNISOLONE SODIUM SUCCINATE SCH MG: 125 INJECTION, POWDER, FOR SOLUTION INTRAMUSCULAR; INTRAVENOUS at 23:31

## 2021-11-12 NOTE — PDOC1
History and Physical


Date of Admission


Date of Admission


DATE: 11/12/21 


TIME: 20:32





Identification/Chief Complaint


Chief Complaint


cant breathe





Source


Source:  Chart review, Patient





History of Present Illness


History of Present Illness





 Mr. Cruz,  is a 70  year old male admit in respiratory distress,.  Sudden 

onset of wheezing and dyspnea,  he has no cough and no fever.


He had COPD in 2005 with asthma,  was hospitalzied at [facility] but got markedly 

better when he quit smoking and he has had no problem since, and got off oxygen 

that year.


He used to weigh about 100 lbs more and has chronic LE lymphedema that he says 

is normal for him


he was in extremis on presentation and bipap placed and he feels much better. 


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

a [employer] carrier.





Past Medical History


Cardiovascular:  CAD, HTN, Hyperlipidemia, Other


Pulmonary:  Asthma, Bronchitis, COPD


CENTRAL NERVOUS SYSTEM:  Periperal neuropathy


GI:  GERD, GI bleed, Gastritis


Heme/Onc:  Anemia NOS


Hepatobiliary:  No pertinent hx


Psych:  No pertinent hx


Musculoskeletal:   low back pain


Rheumatologic:  No pertinent hx


Infectious disease:  Other


Renal/:  Chronic renal failure


Endocrine:  Diabetes





Past Surgical History


Past Surgical History:  Cataract Removal, Other (he had right middle and lower 

lobes of his lung remvoed in 1990,  benign tumor)





Family History


Family History:  Diabetes





Social History


Smoke:  Quit


ALCOHOL:  none


Drugs:  None





Current Problem List


Problem List


Problems


Medical Problems:


(1) COPD exacerbation


Status: Acute  





(2) End stage renal disease


Status: Acute  





(3) Respiratory failure


Status: Acute  











Current Medications


Current Medications





Current Medications


Albuterol/ Ipratropium (Duoneb) 3 ml 1X  ONCE NEB  Last administered on 

11/12/21at 16:53;  Start 11/12/21 at 16:45;  Stop 11/12/21 at 16:46;  Status DC


Methylprednisolone Sodium Succinate (SOLU-Medrol 125MG VIAL) 125 mg 1X  ONCE IV 

Last administered on 11/12/21at 19:17;  Start 11/12/21 at 17:00;  Stop 11/12/21 

at 17:01;  Status DC


Ondansetron HCl (Zofran) 4 mg PRN Q8HRS  PRN IVP NAUSEA/VOMITING;  Start 

11/12/21 at 18:30;  Stop 11/13/21 at 18:29


Albuterol/ Ipratropium (Duoneb) 3 ml Q4HRS  W/A NEB ;  Start 11/12/21 at 22:00


Methylprednisolone Sodium Succinate (SOLU-Medrol 125MG VIAL) 125 mg Q6HRS IV ;  

Start 11/13/21 at 00:00


Ceftriaxone Sodium (Rocephin) 2 gm 1X  ONCE IVP ;  Start 11/12/21 at 19:30;  

Stop 11/12/21 at 19:31;  Status DC


Azithromycin (Zithromax) 500 mg 1X  ONCE PO ;  Start 11/12/21 at 19:30;  Stop 

11/12/21 at 19:31;  Status DC





Active Scripts


Active


Admelog (Insulin Lispro) 100 Unit/1 Ml Vial 10 Units SQ TIDWMEALS 10 Days


Gabapentin ** (Gabapentin) 100 Mg Capsule 100 Mg PO HS 30 Days


Percocet 5-325 Mg Tablet ** (Oxycodone/Acetaminophen) 1 Each Tablet 1-2 Each PO 

PRN TID PRN


     pain


Oxycontin ** (Oxycodone HCl) 10 Mg Tab.er.12h 1 Tab PO BID PRN MDD 2 Tablet(s)


A and D Ointment (Vits A and D/White Pet/Lanolin) 42.5 Gm Oint...g. 1 Cira TP PRN

Q1HR PRN 14 Days


Gabapentin 300 Mg Capsule 300 Mg PO TID 30 Days


Colace (Docusate Sodium) 100 Mg Capsule 100 Mg PO DAILY


Polyethylene Glycol 3350 17 Gm Powd.pack 17 Gm PO PRN DAILY PRN


Reported


Simvastatin 10 Mg Tablet 1 Tab PO QHS


Aspir 81 (Aspirin) 81 Mg Tablet.dr 1 Tab PO DAILY


Renvela (Sevelamer Carbonate) 800 Mg Tablet 1 Tab PO TID





Allergies


Allergies:  


Coded Allergies:  


     No Known Drug Allergies (Unverified , 4/22/20)





ROS


General:  YES: Chills, Malaise; 


   No: Night Sweats, Fatigue, Appetite, Other


PSYCHOLOGICAL ROS:  No: Anxiety, Behavioral Disorder, Concentration difficultie,

Decreased libido, Depression, Disorientation, Hallucinations, Hostility, 

Irritablity, Memory difficulties, Mood Swings, Obsessive thoughts, Physical 

abuse, Sexual abuse, Sleep disturbances, Suicidal ideation, Other


Eyes:  No Blurry vision, No Decreased vision, No Double vision, No Dry eyes, No 

Excessive tearing, No Eye Pain, No Itchy Eyes, No Loss of vision, No 

Photophobia, No Scotomata, No Uses contacts, No Uses glasses, No Other


HEENT:  No: Heacaches, Visual Changes, Hearing change, Nasal congestion, Nasal 

discharge, Oral lesions, Sinus pain, Sore Throat, Epistaxis, Sneezing, Snoring, 

Tinnitus, Vertigo, Vocal changes, Other


Respiratory:  YES: Shortness of breath, SOB with excertion; 


   No: Cough, Hemoptysis, Orthopnea, Pleuritic Pain, Sputum Changes, Stridor, 

Tachypnea, Wheezing, Other


Cardiovascular:  yes Edema (stable, chronic lymph); 


   No Chest Pain, No Palpitations, No Orthopnea, No Paroxysmal Noc. Dyspnea, No 

Lt Headedness, No Other


Gastrointestinal:  Yes Nausea; 


   No Vomiting, No Abdominal Pain, No Diarrhea, No Constipation, No Melena, No 

Hematochezia, No Other


Genitourinary:  No Dysuria, No Frequency, No Incontinence, No Hematuria, No 

Retention, No Discharge, No Urgency, No Pain, No Flank Pain, No Other, No , No ,

No , No , No , No , No 


Musculoskeletal:  Yes Joint Stiffness; 


   No Gait Disturbance, No Joint Pain, No Joint Swelling, No Muscle Pain, No 

Muscular Weakness, No Pain In:, No Swelling In:, No Other


Neurological:  No Behavorial Changes, No Bowel/Bladder ControlChng, No 

Confusion, No Dizziness, No Gait Disturbance, No Headaches, No Impaired 

Coord/balance, No Memory Loss, No Numbness/Tingling, No Seizures, No Speech 

Problems, No Tremors, No Visual Changes, No Weakness, No Other


Skin:  No Dry Skin, No Eczema, No Hair Changes, No Lumps, No Mole Changes, No 

Mottling, No Nail Changes, No Pruritus, No Rash, No Skin Lesion Changes, No 

Other, No Acne





Physical Exam


General:  Alert, Oriented X3, Cooperative, moderate distress


HEENT:  Atraumatic, PERRLA


Lungs:  Other (rales, wheeze,  moderate volunme,  no pain or cough)


Abdomen:  Normal bowel sounds, Soft


Extremities:  No cyanosis, Normal pulses, Other (chronci LE,   with skin change 

)


Neuro:  Normal speech, Sensation intact


Psych/Mental Status:  Mental status NL, Mood NL





Vitals


Vitals





Vital Signs








  Date Time  Temp Pulse Resp B/P (MAP) Pulse Ox O2 Delivery O2 Flow Rate FiO2


 


11/12/21 19:40  66  169/84 (112) 100 Room Air  


 


11/12/21 16:23 98.7  28    6.0 





 98.7       











Labs


Labs





Laboratory Tests








Test


 11/12/21


17:08 11/12/21


17:17 11/12/21


18:30


 


O2 Saturation 99 % (92-99)   


 


Arterial Blood pH


 7.38


(7.35-7.45) 


 





 


Arterial Blood pCO2 at


Patient Temp 47 mmHg


(35-46) 


 





 


Arterial Blood pO2 at Patient


Temp 237 mmHg


() 


 





 


Arterial Blood HCO3


 27 mmol/L


(21-28) 


 





 


Arterial Blood Base Excess


 2 mmol/L


(-3-3) 


 





 


Oxyhemoglobin 98.4 %   


 


Methemoglobin


 0.2 %


(0.0-1.9) 


 





 


Carbon Monoxide, Quantitative


 0.3 %


(0.0-1.9) 


 





 


FiO2 60 bipap   


 


Influenza Type A Antigen


 


 Negative


(NEGATIVE) 





 


Influenza Type B Antigen


 


 Negative


(NEGATIVE) 





 


White Blood Count


 


 


 8.0 x10^3/uL


(4.0-11.0)


 


Red Blood Count


 


 


 2.35 x10^6/uL


(4.30-5.70)


 


Hemoglobin


 


 


 8.2 g/dL


(13.0-17.5)


 


Hematocrit


 


 


 24.8 %


(39.0-53.0)


 


Mean Corpuscular Volume


 


 


 106 fL


()


 


Mean Corpuscular Hemoglobin   35 pg (25-35) 


 


Mean Corpuscular Hemoglobin


Concent 


 


 33 g/dL


(31-37)


 


Red Cell Distribution Width


 


 


 16.1 %


(11.5-14.5)


 


Platelet Count


 


 


 135 x10^3/uL


(140-400)


 


Neutrophils (%) (Auto)   82 % (31-73) 


 


Lymphocytes (%) (Auto)   10 % (24-48) 


 


Monocytes (%) (Auto)   7 % (0-9) 


 


Eosinophils (%) (Auto)   1 % (0-3) 


 


Basophils (%) (Auto)   0 % (0-3) 


 


Neutrophils # (Auto)


 


 


 6.5 x10^3/uL


(1.8-7.7)


 


Lymphocytes # (Auto)


 


 


 0.8 x10^3/uL


(1.0-4.8)


 


Monocytes # (Auto)


 


 


 0.5 x10^3/uL


(0.0-1.1)


 


Eosinophils # (Auto)


 


 


 0.1 x10^3/uL


(0.0-0.7)


 


Basophils # (Auto)


 


 


 0.0 x10^3/uL


(0.0-0.2)


 


Prothrombin Time


 


 


 14.2 SEC


(11.7-14.0)


 


Prothromb Time International


Ratio 


 


 1.1 (0.8-1.1) 





 


Sodium Level


 


 


 139 mmol/L


(136-145)


 


Potassium Level


 


 


 5.2 mmol/L


(3.5-5.1)


 


Chloride Level


 


 


 100 mmol/L


()


 


Carbon Dioxide Level


 


 


 31 mmol/L


(21-32)


 


Anion Gap   8 (6-14) 


 


Blood Urea Nitrogen


 


 


 33 mg/dL


(8-26)


 


Creatinine


 


 


 9.3 mg/dL


(0.7-1.3)


 


Estimated GFR


(Cockcroft-Gault) 


 


 6.8 





 


BUN/Creatinine Ratio   4 (6-20) 


 


Glucose Level


 


 


 118 mg/dL


(70-99)


 


Lactic Acid Level


 


 


 1.2 mmol/L


(0.4-2.0)


 


Calcium Level


 


 


 9.1 mg/dL


(8.5-10.1)


 


Magnesium Level


 


 


 2.4 mg/dL


(1.8-2.4)


 


Total Bilirubin


 


 


 0.4 mg/dL


(0.2-1.0)


 


Aspartate Amino Transf


(AST/SGOT) 


 


 9 U/L (15-37) 





 


Alanine Aminotransferase


(ALT/SGPT) 


 


 15 U/L (16-63) 





 


Alkaline Phosphatase


 


 


 65 U/L


()


 


Creatine Kinase


 


 


 50 U/L


()


 


Creatine Kinase MB (Mass)


 


 


 0.5 ng/mL


(0.0-3.6)


 


Creatine Kinase MB Relative


Index 


 


  % (0-4) 





 


Troponin I High Sensitivity   25 ng/L (4-75) 


 


NT-Pro-B-Type Natriuretic


Peptide 


 


 19265 pg/mL


(0-124)


 


Total Protein


 


 


 7.7 g/dL


(6.4-8.2)


 


Albumin


 


 


 3.5 g/dL


(3.4-5.0)


 


Albumin/Globulin Ratio   0.8 (1.0-1.7) 


 


Lipase


 


 


 36 U/L


()








Laboratory Tests








Test


 11/12/21


17:08 11/12/21


17:17 11/12/21


18:30


 


O2 Saturation 99 % (92-99)   


 


Arterial Blood pH


 7.38


(7.35-7.45) 


 





 


Arterial Blood pCO2 at


Patient Temp 47 mmHg


(35-46) 


 





 


Arterial Blood pO2 at Patient


Temp 237 mmHg


() 


 





 


Arterial Blood HCO3


 27 mmol/L


(21-28) 


 





 


Arterial Blood Base Excess


 2 mmol/L


(-3-3) 


 





 


Oxyhemoglobin 98.4 %   


 


Methemoglobin


 0.2 %


(0.0-1.9) 


 





 


Carbon Monoxide, Quantitative


 0.3 %


(0.0-1.9) 


 





 


FiO2 60 bipap   


 


Influenza Type A Antigen


 


 Negative


(NEGATIVE) 





 


Influenza Type B Antigen


 


 Negative


(NEGATIVE) 





 


White Blood Count


 


 


 8.0 x10^3/uL


(4.0-11.0)


 


Red Blood Count


 


 


 2.35 x10^6/uL


(4.30-5.70)


 


Hemoglobin


 


 


 8.2 g/dL


(13.0-17.5)


 


Hematocrit


 


 


 24.8 %


(39.0-53.0)


 


Mean Corpuscular Volume


 


 


 106 fL


()


 


Mean Corpuscular Hemoglobin   35 pg (25-35) 


 


Mean Corpuscular Hemoglobin


Concent 


 


 33 g/dL


(31-37)


 


Red Cell Distribution Width


 


 


 16.1 %


(11.5-14.5)


 


Platelet Count


 


 


 135 x10^3/uL


(140-400)


 


Neutrophils (%) (Auto)   82 % (31-73) 


 


Lymphocytes (%) (Auto)   10 % (24-48) 


 


Monocytes (%) (Auto)   7 % (0-9) 


 


Eosinophils (%) (Auto)   1 % (0-3) 


 


Basophils (%) (Auto)   0 % (0-3) 


 


Neutrophils # (Auto)


 


 


 6.5 x10^3/uL


(1.8-7.7)


 


Lymphocytes # (Auto)


 


 


 0.8 x10^3/uL


(1.0-4.8)


 


Monocytes # (Auto)


 


 


 0.5 x10^3/uL


(0.0-1.1)


 


Eosinophils # (Auto)


 


 


 0.1 x10^3/uL


(0.0-0.7)


 


Basophils # (Auto)


 


 


 0.0 x10^3/uL


(0.0-0.2)


 


Prothrombin Time


 


 


 14.2 SEC


(11.7-14.0)


 


Prothromb Time International


Ratio 


 


 1.1 (0.8-1.1) 





 


Sodium Level


 


 


 139 mmol/L


(136-145)


 


Potassium Level


 


 


 5.2 mmol/L


(3.5-5.1)


 


Chloride Level


 


 


 100 mmol/L


()


 


Carbon Dioxide Level


 


 


 31 mmol/L


(21-32)


 


Anion Gap   8 (6-14) 


 


Blood Urea Nitrogen


 


 


 33 mg/dL


(8-26)


 


Creatinine


 


 


 9.3 mg/dL


(0.7-1.3)


 


Estimated GFR


(Cockcroft-Gault) 


 


 6.8 





 


BUN/Creatinine Ratio   4 (6-20) 


 


Glucose Level


 


 


 118 mg/dL


(70-99)


 


Lactic Acid Level


 


 


 1.2 mmol/L


(0.4-2.0)


 


Calcium Level


 


 


 9.1 mg/dL


(8.5-10.1)


 


Magnesium Level


 


 


 2.4 mg/dL


(1.8-2.4)


 


Total Bilirubin


 


 


 0.4 mg/dL


(0.2-1.0)


 


Aspartate Amino Transf


(AST/SGOT) 


 


 9 U/L (15-37) 





 


Alanine Aminotransferase


(ALT/SGPT) 


 


 15 U/L (16-63) 





 


Alkaline Phosphatase


 


 


 65 U/L


()


 


Creatine Kinase


 


 


 50 U/L


()


 


Creatine Kinase MB (Mass)


 


 


 0.5 ng/mL


(0.0-3.6)


 


Creatine Kinase MB Relative


Index 


 


  % (0-4) 





 


Troponin I High Sensitivity   25 ng/L (4-75) 


 


NT-Pro-B-Type Natriuretic


Peptide 


 


 86752 pg/mL


(0-124)


 


Total Protein


 


 


 7.7 g/dL


(6.4-8.2)


 


Albumin


 


 


 3.5 g/dL


(3.4-5.0)


 


Albumin/Globulin Ratio   0.8 (1.0-1.7) 


 


Lipase


 


 


 36 U/L


()











VTE Prophylaxis Ordered


VTE Prophylaxis Devices:  Yes


VTE Pharmacological Prophylaxi:  Yes





Assessment/Plan


Assessment/Plan


acute hypercarbic respiratory failure


COPD with acute exacerbation, bronchitis








CAD, CHF,  Diabetes-Type II, Hypertension, 


End stage Renal Disease on HD,  had dialysis today, left arm fistula 


Chronic BACK/neck pain, OA


s/p lung lobectomy 1990, benign





Justifications for Admission


Other Justification


acute discitis











ASIF KHAN MD                 Nov 12, 2021 20:36

## 2021-11-12 NOTE — PHYS DOC
Past Medical History


Past Medical History:  CAD, CHF, COPD, Diabetes-Type II, Hypertension, Renal 

Disease, Renal Failure, Other


Additional Past Medical Histor:  Chronic BACK/neck pain,L UPPER ARM FISTUL

A,CRF,ESRD,DISKITIS,OSTEOMYELITIS


Past Surgical History:  Coronary Bypass Surgery, Other


Additional Past Surgical Histo:  R middle/lower lobe lung removal,vein graft 

left thigh,SHUNT RT CHEST


Smoking Status:  Former Smoker


Alcohol Use:  None


Drug Use:  None





General Adult


EDM:


Chief Complaint:  SHORTNESS OF BREATH





HPI:


HPI:





Patient is a 70  year old male who presents with dyspnea, which began abruptly a

couple of hours ago.  He is wheezing.  He denies cough.  Denies chest pain.  He 

has a history of COPD and congestive heart failure.  He dialyzed earlier today, 

and reports the fluid was removed.  He reports compliance with all medications. 

He has a previous history of smoking but had quit several years ago.  He is 

markedly tachypneic, markedly dyspneic, repeatedly states "I cannot breathe.  I 

cannot breathe."  He has chronic lower extremity swelling, which is unchanged.  

He denies recent hospitalizations, denies recent sick contacts.  Denies 

hemoptysis.  Denies syncope.  He does not usually require supplemental oxygen 

therapy, per his report.





Review of Systems:


Review of Systems:


Constitutional:   Denies fever or chills. []


Eyes:   Denies change in visual acuity. []


HENT:   Denies nasal congestion or sore throat. [] 


Respiratory: Reports severe dyspnea, wheezing, dry cough.


Cardiovascular:   Denies chest pain.  Chronic lower extremity edema


GI:   Denies abdominal pain, nausea, vomiting


Musculoskeletal:   Denies back pain or joint pain.  Bilateral lower extremity 

swelling.


Integument: Bilateral lower extremity stasis dermatitis.


Neurologic:   Denies headache, focal weakness or sensory changes. [] 


Psychiatric: Severe anxiety as it pertains to current acute illness.  []





Heart Score:


C/O Chest Pain:  No


Risk Factors:


Risk Factors:  DM, Current or recent (<one month) smoker, HTN, HLP, family 

history of CAD, obesity.


Risk Scores:


Score 0 - 3:  2.5% MACE over next 6 weeks - Discharge Home


Score 4 - 6:  20.3% MACE over next 6 weeks - Admit for Clinical Observation


Score 7 - 10:  72.7% MACE over next 6 weeks - Early Invasive Strategies





Current Medications:





Current Medications








 Medications


  (Trade)  Dose


 Ordered  Sig/Anna  Start Time


 Stop Time Status Last Admin


Dose Admin


 


 Albuterol/


 Ipratropium


  (Duoneb)  3 ml  1X  ONCE  11/12/21 16:45


 11/12/21 16:46 DC 11/12/21 16:53


3 ML











Allergies:


Allergies:





Allergies








Coded Allergies Type Severity Reaction Last Updated Verified


 


  No Known Drug Allergies    4/22/20 No











Physical Exam:


PE:





Constitutional: The patient is acutely ill-appearing, he is in severe resp

iratory distress.  He is also chronically ill-appearing.


HENT: Normocephalic, atraumatic, or pharynx is patent and clear.  Mucous 

membranes are moist.


Neck: Trachea is midline, neck is nontender, neck is supple


Cardiovascular: Regular rate and rhythm, +2 radial pulses.


Lungs & Thorax: He is in acute respiratory distress, marked tachypnea, marked 

intercostal and supraclavicular retractions.  Diffuse bilateral inspiratory and 

expiratory wheezing, with markedly diminished breath sounds bilaterally.  No 

stridor.  No palpable crepitus or subcutaneous emphysema of the chest or thorax.

  He does speak in 1-3 word short sentences.


Abdomen: Abdomen soft, obese, nondistended, nontender


Skin: Bilateral lower extremity venous stasis dermatitis.  No large open wounds,

 no warmth or erythema.


Back: No deformity, no tenderness


Extremities: Bilateral lower extremity lymphedema.  No calf tenderness.  

Bilateral lower extremity stasis dermatitis.


Neurologic: He is alert, awake, oriented to person, place, situation, no facial 

asymmetry, moves all 4 extremities equally, follows commands, localizes to pain


Psychologic: Anxious.





Current Patient Data:


Vital Signs:





                                   Vital Signs








  Date Time  Temp Pulse Resp B/P (MAP) Pulse Ox O2 Delivery O2 Flow Rate FiO2


 


11/12/21 16:23  105 28  100 Nasal Cannula 6.0 











EKG:


EKG:


EKG is interpreted at 1726


Rhythm is sinus


Rate is 85 bpm


Axis is left


No STEMI





Radiology/Procedures:


Radiology/Procedures:


[]





Course & Med Decision Making:


Course & Med Decision Making


Pertinent Labs and Imaging studies reviewed. (See chart for details)





The patient is placed on BiPAP shortly after arrival.  He is given a DuoNeb.  He

 is markedly improved almost immediately.  It took quite a while to obtain IV 

access.  He is given IV Solu-Medrol.  He is now speaking in full and clear 

sentences.  Oxygen saturation was 100% on BiPAP.  He is no longer in any 

significant distress.  He is smiling, briskly conversant, reports feeling much 

better.  He continues to deny any chest pain.  He is no longer complaining of 

any dyspnea.  He expresses significant gratitude for helping him.  Blood 

cultures are obtained.  On his chest x-ray, there does appear to be right-sided 

atelectasis, cardiomegaly and some chronic appearing changes.  Questionable 

associated right sided infiltrate.  He is empirically given antibiotics for COPD

 exacerbation, I ordered IV Rocephin and p.o. azithromycin.  I have explained 

that he requires admission to the hospital.  He is comfortable with this plan.  

He is accepted for admission by Dr. Martinez.





Aishwarya Disclaimer:


Dragon Disclaimer:


This electronic medical record was generated, in whole or in part, using a voice

 recognition dictation system.





Departure


Departure


Impression:  


   Primary Impression:  


   Respiratory failure


   Additional Impressions:  


   COPD exacerbation


   End stage renal disease


Disposition:  09 ADMITTED AS INPATIENT


Admitting Physician:  HIMS


Condition:  GUARDED


Referrals:  


KIESHA NUNO MD (PCP)











VERONICA NOLEN DO             Nov 12, 2021 16:56

## 2021-11-12 NOTE — RAD
Exam: Chest one view



INDICATION: Chest pain



TECHNIQUE: Frontal view of the chest



Comparisons: 7/30/2020



FINDINGS:

Sternotomy wires are noted.



Heart is enlarged pulmonary vessels are within normal limits.



Patchy bilateral airspace disease. No pleural effusion



IMPRESSION:

Patchy bilateral airspace disease favored be infectious or inflammatory in etiology.



Electronically signed by: Fozia Tyson MD (11/12/2021 8:42 PM) VINNY

## 2021-11-12 NOTE — NUR
The patient, RAFIQ MENDES, 69 y/o, M admitted by ASIF KHAN MD, was given written 
information regarding hospital policies, unit procedures and contact persons. Pt ambulated 
to bed with stand by assist to bed. Telemetry monitor applied poc explained pt denied pain 
at this time but c/o sob pt has bipap on spo2 92%. Assessment completed vs obtained and 
stable. Pt noted to have bilateral lower ext edema with dryness to extremities and healed 
areas secondary to blisters. Pt oriented to surroundings and call light will resume care and 
continue to monitor pt. Dr Khan on unit and further orders obtained.

## 2021-11-13 VITALS
DIASTOLIC BLOOD PRESSURE: 75 MMHG | SYSTOLIC BLOOD PRESSURE: 133 MMHG | SYSTOLIC BLOOD PRESSURE: 133 MMHG | DIASTOLIC BLOOD PRESSURE: 75 MMHG | SYSTOLIC BLOOD PRESSURE: 133 MMHG | SYSTOLIC BLOOD PRESSURE: 133 MMHG | DIASTOLIC BLOOD PRESSURE: 75 MMHG | DIASTOLIC BLOOD PRESSURE: 75 MMHG

## 2021-11-13 VITALS — SYSTOLIC BLOOD PRESSURE: 145 MMHG | DIASTOLIC BLOOD PRESSURE: 86 MMHG

## 2021-11-13 VITALS — SYSTOLIC BLOOD PRESSURE: 147 MMHG | DIASTOLIC BLOOD PRESSURE: 83 MMHG

## 2021-11-13 LAB
ALBUMIN SERPL-MCNC: 3.3 G/DL (ref 3.4–5)
ALBUMIN/GLOB SERPL: 0.7 {RATIO} (ref 1–1.7)
ALP SERPL-CCNC: 65 U/L (ref 46–116)
ALT SERPL-CCNC: 13 U/L (ref 16–63)
ANION GAP SERPL CALC-SCNC: 8 MMOL/L (ref 6–14)
AST SERPL-CCNC: 7 U/L (ref 15–37)
BASOPHILS # BLD AUTO: 0 X10^3/UL (ref 0–0.2)
BASOPHILS NFR BLD: 0 % (ref 0–3)
BILIRUB SERPL-MCNC: 0.4 MG/DL (ref 0.2–1)
BUN SERPL-MCNC: 39 MG/DL (ref 8–26)
BUN/CREAT SERPL: 4 (ref 6–20)
CALCIUM SERPL-MCNC: 9 MG/DL (ref 8.5–10.1)
CHLORIDE SERPL-SCNC: 100 MMOL/L (ref 98–107)
CO2 SERPL-SCNC: 31 MMOL/L (ref 21–32)
CREAT SERPL-MCNC: 10 MG/DL (ref 0.7–1.3)
EOSINOPHIL NFR BLD: 0 % (ref 0–3)
EOSINOPHIL NFR BLD: 0 X10^3/UL (ref 0–0.7)
ERYTHROCYTE [DISTWIDTH] IN BLOOD BY AUTOMATED COUNT: 16.1 % (ref 11.5–14.5)
GFR SERPLBLD BASED ON 1.73 SQ M-ARVRAT: 6.3 ML/MIN
GLUCOSE SERPL-MCNC: 177 MG/DL (ref 70–99)
HCT VFR BLD CALC: 24.9 % (ref 39–53)
HGB BLD-MCNC: 8.4 G/DL (ref 13–17.5)
LYMPHOCYTES # BLD: 0.5 X10^3/UL (ref 1–4.8)
LYMPHOCYTES NFR BLD AUTO: 11 % (ref 24–48)
MCH RBC QN AUTO: 35 PG (ref 25–35)
MCHC RBC AUTO-ENTMCNC: 34 G/DL (ref 31–37)
MCV RBC AUTO: 105 FL (ref 79–100)
MONO #: 0.1 X10^3/UL (ref 0–1.1)
MONOCYTES NFR BLD: 2 % (ref 0–9)
NEUT #: 3.9 X10^3/UL (ref 1.8–7.7)
NEUTROPHILS NFR BLD AUTO: 87 % (ref 31–73)
PLATELET # BLD AUTO: 129 X10^3/UL (ref 140–400)
POTASSIUM SERPL-SCNC: 6.1 MMOL/L (ref 3.5–5.1)
PROT SERPL-MCNC: 7.8 G/DL (ref 6.4–8.2)
RBC # BLD AUTO: 2.36 X10^6/UL (ref 4.3–5.7)
SODIUM SERPL-SCNC: 139 MMOL/L (ref 136–145)
WBC # BLD AUTO: 4.5 X10^3/UL (ref 4–11)

## 2021-11-13 PROCEDURE — 5A1D70Z PERFORMANCE OF URINARY FILTRATION, INTERMITTENT, LESS THAN 6 HOURS PER DAY: ICD-10-PCS | Performed by: INTERNAL MEDICINE

## 2021-11-13 PROCEDURE — 5A09357 ASSISTANCE WITH RESPIRATORY VENTILATION, LESS THAN 24 CONSECUTIVE HOURS, CONTINUOUS POSITIVE AIRWAY PRESSURE: ICD-10-PCS | Performed by: INTERNAL MEDICINE

## 2021-11-13 RX ADMIN — OXYCODONE HYDROCHLORIDE AND ACETAMINOPHEN PRN TAB: 10; 325 TABLET ORAL at 08:10

## 2021-11-13 RX ADMIN — METHYLPREDNISOLONE SODIUM SUCCINATE SCH MG: 125 INJECTION, POWDER, FOR SOLUTION INTRAMUSCULAR; INTRAVENOUS at 05:37

## 2021-11-13 RX ADMIN — SEVELAMER CARBONATE SCH MG: 800 TABLET, FILM COATED ORAL at 08:03

## 2021-11-13 RX ADMIN — SEVELAMER CARBONATE SCH MG: 800 TABLET, FILM COATED ORAL at 14:59

## 2021-11-13 NOTE — CONS
DATE OF CONSULTATION: 11/13/2021

REASON FOR CONSULTATION:  I was asked to see this 70-year-old gentleman for 

acute respiratory failure.



HISTORY OF PRESENT ILLNESS:  He is currently on BiPAP and it is difficult to get

information from him, but he states that he has history of 30-pack-year smoking,

stopped smoking in 2006.  He has COPD.  He has end-stage renal disease and is on

dialysis on Monday, Wednesday and Friday.  He did get dialysis yesterday.  He 

presented to the Emergency Room with shortness of breath a couple of hours 

before presentation.  He also had wheezing.  He has occasional cough.  Denies 

fever and chills.



PAST MEDICAL HISTORY:  Coronary artery disease, CHF.  He has diastolic and 

systolic CHF, COPD, diabetes mellitus, hypertension, end-stage renal disease, on

hemodialysis.  He has a history of right middle and lower lobectomy for benign 

disease in 1990s.



ALLERGIES:  No known drug allergies.



MEDICATIONS:  Currently, he is on Rocephin, azithromycin, Solu-Medrol 125 mg 

every 6 hours, DuoNeb.



SOCIAL HISTORY:  History of 30-pack-year smoking, quit smoking in 2006.



FAMILY HISTORY:  Hypertension.



REVIEW OF SYSTEMS:  As mentioned as above.  He is not on oxygen at home.  Other 

systems otherwise negative.



PHYSICAL EXAMINATION:

GENERAL:  He is on BiPAP 16/8, 40% FiO2.  His shortness of breath has improved. 

He is not tachypneic.  His temperature 97.9, heart rate 69, respiratory rate 20,

blood pressure 147/83, O2 saturation 94%.

HEENT:  Normocephalic, atraumatic.  Pupils equal, round, reactive to light.  

There is a BiPAP mask on.

NECK:  There is no JVD.  No lymphadenopathy or thyromegaly.

CARDIOVASCULAR:  Regular rate and rhythm.

CHEST:  Inspection is normal.

LUNGS:  There are bibasilar crackles, dullness at the bases.

ABDOMEN:  Soft.  Bowel sounds are good.

EXTREMITIES:  There are chronic changes.  He has chronic lymphedema.

SKIN:  Chronic changes.

NEUROLOGIC:  Alert and oriented.



LABORATORY DATA:  I reviewed the following lab data:  Chest x-ray shows 

bilateral infiltrate.  COVID rapid testing is negative.  Influenza A and B are 

negative.  This morning, sodium 139, potassium 6.1, chloride 100, CO2 of 31, BUN

39, creatinine 10.  BNP 24,653.  Troponin 37, 36 within normal limit.  AST 7, 

ALT 13, alkaline phosphatase 65.  WBC 4.5, hemoglobin 8.4, platelets 129.  ABG 

at 1700 yesterday, pH 7.38, pCO2 of 47, pO2 of 237 on BiPAP 60%.



IMPRESSION:

1.  Acute hypoxemic respiratory failure, multifactorial in etiology including 

acute systolic and diastolic congestive heart failure, acute exacerbation of 

chronic obstructive pulmonary disease, rule out pneumonia.

2.  Abnormal chest x-ray.

3.  Acute systolic and diastolic congestive heart failure.

4.  Acute exacerbation of chronic obstructive pulmonary disease.

5.  Coronary artery disease.

6.  History of diskitis.

7.  End-stage renal disease, on hemodialysis.

8.  Hyperkalemia.



PLAN AND RECOMMENDATIONS:

1.  Titrate FiO2 to keep O2 saturation 90%.

2.  BiPAP p.r.n. during day and continuously at night.  We will try without 

BiPAP.

3.  Agree with emergent hemodialysis.

4.  Continue Rocephin and azithromycin for now.

5.  We will change Solu-Medrol to 40 mg IV every 8 hours.

6.  Continue not smoking.

7.  Lovenox for DVT prophylaxis.

8.  Protonix for stress ulcer prophylaxis.

9.  Nephrology and Cardiology consultation.



Thank you very much for allowing me to participate in care of this very nice 

gentleman.  The findings and recommendations were discussed with the patient and

RN.







NIKOLAI/SUN

DR: NIKOLAI/yuniel   DD: 11/13/2021 09:05

DT: 11/13/2021 09:39   TID: [id number]

## 2021-11-13 NOTE — PDOC2
CONSULT


Date of Consult


Date of Consult


DATE: 11/13/21 


TIME: 11:49





Reason for Consult


Reason for Consult:


Acute renal failure (however patient carries a diagnosis of ESRD and has been on

dialysis for 4 years)





Referring Physician


Referring Physician:


Marsh





Identification/Chief Complaint


Chief Complaint


Shortness of breath





Source


Source:  Chart review, Patient





History of Present Illness


Reason for Visit:


Patient is a pleasant 70-year-old -American gentleman with known ESRD 

secondary to diabetes and hypertension.  He has been on dialysis for 4 years.  

He initially started off with peritoneal dialysis but has transition to 

hemodialysis in the last few years.  He dialyzes on a Monday Wednesday Friday 

basis at [facility] under the care of Dr. Fraga.





He claims he stepped out of the bathroom and was noted to have broken out into a

sweat.  He was also increasingly weak with shortness of breath and had some 

symptoms of a sore throat.  He was brought to the hospital for further 

evaluation he was hence unable to go to dialysis yesterday.  We were asked to 

finish his dialysis today.  He has been seen by pulmonology.





Past Medical History


Cardiovascular:  CAD, HTN, Hyperlipidemia, Other


Pulmonary:  Asthma, Bronchitis, COPD


CENTRAL NERVOUS SYSTEM:  Periperal neuropathy


GI:  GERD, GI bleed, Gastritis


Heme/Onc:  Anemia NOS


Hepatobiliary:  No pertinent hx


Psych:  No pertinent hx


Musculoskeletal:   low back pain


Rheumatologic:  No pertinent hx


Infectious disease:  Other


Renal/:  Chronic renal failure


Endocrine:  Diabetes





Past Surgical History


Past Surgical History:  Cataract Removal, Other (he had right middle and lower 

lobes of his lung remvoed in 1990,  benign tumor)





Family History


Family History:  Diabetes, Hypertension





Social History


Quit


ALCOHOL:  none


Drugs:  None


Lives:  with Family


Domestic Violence:  Neg





Current Problem List


Problem List


Problems


Medical Problems:


(1) COPD exacerbation


Status: Acute  





(2) End stage renal disease


Status: Acute  





(3) Respiratory failure


Status: Acute  











Current Medications


Current Medications





Current Medications


Albuterol/ Ipratropium (Duoneb) 3 ml 1X  ONCE NEB  Last administered on 

11/12/21at 16:53;  Start 11/12/21 at 16:45;  Stop 11/12/21 at 16:46;  Status DC


Methylprednisolone Sodium Succinate (SOLU-Medrol 125MG VIAL) 125 mg 1X  ONCE IV 

Last administered on 11/12/21at 19:17;  Start 11/12/21 at 17:00;  Stop 11/12/21 

at 17:01;  Status DC


Ondansetron HCl (Zofran) 4 mg PRN Q8HRS  PRN IVP NAUSEA/VOMITING;  Start 

11/12/21 at 18:30;  Stop 11/13/21 at 18:29


Albuterol/ Ipratropium (Duoneb) 3 ml Q4HRS  W/A NEB ;  Start 11/12/21 at 22:00


Methylprednisolone Sodium Succinate (SOLU-Medrol 125MG VIAL) 125 mg Q6HRS IV  

Last administered on 11/13/21at 05:37;  Start 11/13/21 at 00:00;  Stop 11/13/21 

at 08:58;  Status DC


Ceftriaxone Sodium (Rocephin) 2 gm 1X  ONCE IVP ;  Start 11/12/21 at 19:30;  

Stop 11/12/21 at 19:31;  Status Cancel


Azithromycin (Zithromax) 500 mg 1X  ONCE PO  Last administered on 11/12/21at 

23:27;  Start 11/12/21 at 19:30;  Stop 11/12/21 at 19:31;  Status DC


Ceftriaxone Sodium (Rocephin) 1 gm QHS IVP ;  Start 11/13/21 at 21:00


Azithromycin (Zithromax) 250 mg DAILY PO  Last administered on 11/13/21at 08:03;

 Start 11/13/21 at 09:00


Aspirin (Ecotrin) 81 mg DAILY PO  Last administered on 11/13/21at 08:02;  Start 

11/13/21 at 09:00


Atorvastatin Calcium (Lipitor) 10 mg DAILY PO ;  Start 11/13/21 at 09:00;  Stop 

11/12/21 at 22:16;  Status DC


Carvedilol (Coreg) 6.25 mg BIDWMEALS PO  Last administered on 11/13/21at 08:03; 

Start 11/13/21 at 08:00


Lisinopril (Prinivil) 5 mg DAILY PO  Last administered on 11/13/21at 08:03;  

Start 11/13/21 at 09:00


Oxycodone/ Acetaminophen (Percocet 10/325) 1 tab PRN BID  PRN PO PAIN Last 

administered on 11/13/21at 08:10;  Start 11/12/21 at 21:45


Sevelamer Carbonate (Renvela) 800 mg TIDWMEALS PO  Last administered on 

11/13/21at 08:03;  Start 11/13/21 at 08:00


Cyanocobalamin (Vitamin B-12 Inj) 1,000 mcg 1X  ONCE IM  Last administered on 

11/12/21at 22:30;  Start 11/12/21 at 21:45;  Stop 11/12/21 at 22:01;  Status DC


Atorvastatin Calcium (Lipitor) 10 mg HS PO  Last administered on 11/12/21at 

22:28;  Start 11/12/21 at 22:15


Ceftriaxone Sodium (Rocephin) 2 gm ONCE  ONCE IVP  Last administered on 

11/12/21at 23:33;  Start 11/12/21 at 23:45;  Stop 11/12/21 at 23:46;  Status DC


Lactobacillus Rhamnosus (Culturelle) 1 cap BID PO  Last administered on 

11/13/21at 08:02;  Start 11/13/21 at 09:00


Methylprednisolone Sodium Succinate (SOLU-Medrol 40MG VIAL) 40 mg Q8HRS IV ;  

Start 11/13/21 at 14:00


Sodium Chloride 1,000 ml @  1,000 mls/hr Q1H PRN IV hypotension;  Start 11/13/21

at 10:30;  Stop 11/13/21 at 16:29;  Status UNV


Albumin Human 200 ml @  200 mls/hr 1X PRN  PRN IV Hypotension;  Start 11/13/21 

at 10:30;  Stop 11/13/21 at 16:29;  Status UNV


Sodium Chloride 1,000 ml @  400 mls/hr Q2H30M PRN IV PATENCY;  Start 11/13/21 at

10:30;  Stop 11/13/21 at 22:29;  Status UNV


Info (PHARMACY MONITORING -- do not chart) 1 each PRN DAILY  PRN MC SEE 

COMMENTS;  Start 11/13/21 at 11:45;  Status UNV


Info (PHARMACY MONITORING -- do not chart) 1 each PRN DAILY  PRN MC SEE 

COMMENTS;  Start 11/13/21 at 11:45;  Status UNV





Active Scripts


Active


Reported


Atorvastatin Calcium 10 Mg Tablet 1 Tab PO HS


Coreg  ** (Carvedilol) 6.25 Mg Tablet 6.25 Mg PO BIDWMEALS


Lisinopril 5 Mg Tablet 1 Tab PO DAILY


Percocet  Mg Tablet ** (Oxycodone/Acetaminophen) 1 Each Tablet 1 Tab PO 

PRN BID PRN MDD 2 Tablet(s) 5 Days


Aspir 81 (Aspirin) 81 Mg Tablet. 1 Tab PO DAILY


Renvela (Sevelamer Carbonate) 800 Mg Tablet 1 Tab PO TID





Allergies


Allergies:  


Coded Allergies:  


     No Known Drug Allergies (Unverified , 4/22/20)





ROS


Review of System


14 point review of system as per HPI otherwise negative





Physical Exam


Physical Exam





General Appearance:    Awake      Alert Oriented x  3   In  no  Distress


Eyes:       VIsion Unchanged Conjunctiva Normal


EN:       No EN Drainage  Mucous Memb.   moist


Neck:         no  JVD   no  JVP  Supple   no  Thyromegaly


CVS:       S1 S2   soft   Murmur  No Gallop  No Rub   +++  Edema/lymphedema


Resp:       Few current basal Rales   rare rhonchi   no Acc. Muscle use


GI:       BAS +ve    NO Bruit   Non Tender   Non Distended


:       No CVA tenderness;   no suprapubic Tenderness


SKIN:       No visible petechial rashes  Breast Exam deferred


Mu.Sk:       Adequate ROM    no  Muscle Atrophy


Heme:       Unable to palpate Obvious LAD  no palpable splenomegaly


NEURO:       Good Strength and Tone   Cranial Nerves II - XII grossly intact


Psych:        not   Depressed    no  Active hallucination


Vital Signs





Vital Signs








  Date Time  Temp Pulse Resp B/P (MAP) Pulse Ox O2 Delivery O2 Flow Rate FiO2


 


11/13/21 08:10     94  2.0 


 


11/13/21 08:08      Nasal Cannula  


 


11/13/21 08:03  64  145/86    


 


11/13/21 07:00 97.9  20     





 97.9       








Assessment & Plan


ESRD: Seen on hemodialysis and tolerating well.  Blood pressure was 114/68 with 

a heart rate of 63 respirations 14 and afebrile.  See dialysis orders for 

details





Anemia:   Anticipate that he will need initiation of Epogen.  We will transfuse 

with next HD as needed if hemoglobin drops below 7.  May need IV iron based on 

current levels of iron saturation also.





HTN:   Current BP meds reviewed.  See orders for changes.





Abnormal chest x-ray may be suggestive of pulmonary edema.  Will attempt to 

ultrafiltrate as best tolerated by blood pressures.  Defer to pulmonology to 

evaluate for other etiologies of the same





Chronic lymphedema: Unable to aggressively ultrafiltrate at this time





Hyperkalemia: Anticipate correction with dialysis





Discussed Plan of Care and prognosis etc. at length with patient during dialysis





Labs


Labs





Laboratory Tests








Test


 11/12/21


17:08 11/12/21


17:17 11/12/21


17:47 11/12/21


18:30


 


O2 Saturation 99 % (92-99)    


 


Arterial Blood pH


 7.38


(7.35-7.45) 


 


 





 


Arterial Blood pCO2 at


Patient Temp 47 mmHg


(35-46) 


 


 





 


Arterial Blood pO2 at Patient


Temp 237 mmHg


() 


 


 





 


Arterial Blood HCO3


 27 mmol/L


(21-28) 


 


 





 


Arterial Blood Base Excess


 2 mmol/L


(-3-3) 


 


 





 


Oxyhemoglobin 98.4 %    


 


Methemoglobin


 0.2 %


(0.0-1.9) 


 


 





 


Carbon Monoxide, Quantitative


 0.3 %


(0.0-1.9) 


 


 





 


FiO2 60 bipap    


 


Influenza Type A Antigen


 


 Negative


(NEGATIVE) 


 





 


Influenza Type B Antigen


 


 Negative


(NEGATIVE) 


 





 


SARS-CoV-2 Antigen (Rapid)


 


 


 Negative


(NEGATIVE) 





 


White Blood Count


 


 


 


 8.0 x10^3/uL


(4.0-11.0)


 


Red Blood Count


 


 


 


 2.35 x10^6/uL


(4.30-5.70)


 


Hemoglobin


 


 


 


 8.2 g/dL


(13.0-17.5)


 


Hematocrit


 


 


 


 24.8 %


(39.0-53.0)


 


Mean Corpuscular Volume


 


 


 


 106 fL


()


 


Mean Corpuscular Hemoglobin    35 pg (25-35) 


 


Mean Corpuscular Hemoglobin


Concent 


 


 


 33 g/dL


(31-37)


 


Red Cell Distribution Width


 


 


 


 16.1 %


(11.5-14.5)


 


Platelet Count


 


 


 


 135 x10^3/uL


(140-400)


 


Neutrophils (%) (Auto)    82 % (31-73) 


 


Lymphocytes (%) (Auto)    10 % (24-48) 


 


Monocytes (%) (Auto)    7 % (0-9) 


 


Eosinophils (%) (Auto)    1 % (0-3) 


 


Basophils (%) (Auto)    0 % (0-3) 


 


Neutrophils # (Auto)


 


 


 


 6.5 x10^3/uL


(1.8-7.7)


 


Lymphocytes # (Auto)


 


 


 


 0.8 x10^3/uL


(1.0-4.8)


 


Monocytes # (Auto)


 


 


 


 0.5 x10^3/uL


(0.0-1.1)


 


Eosinophils # (Auto)


 


 


 


 0.1 x10^3/uL


(0.0-0.7)


 


Basophils # (Auto)


 


 


 


 0.0 x10^3/uL


(0.0-0.2)


 


Prothrombin Time


 


 


 


 14.2 SEC


(11.7-14.0)


 


Prothromb Time International


Ratio 


 


 


 1.1 (0.8-1.1) 





 


Sodium Level


 


 


 


 139 mmol/L


(136-145)


 


Potassium Level


 


 


 


 5.2 mmol/L


(3.5-5.1)


 


Chloride Level


 


 


 


 100 mmol/L


()


 


Carbon Dioxide Level


 


 


 


 31 mmol/L


(21-32)


 


Anion Gap    8 (6-14) 


 


Blood Urea Nitrogen


 


 


 


 33 mg/dL


(8-26)


 


Creatinine


 


 


 


 9.3 mg/dL


(0.7-1.3)


 


Estimated GFR


(Cockcroft-Gault) 


 


 


 6.8 





 


BUN/Creatinine Ratio    4 (6-20) 


 


Glucose Level


 


 


 


 118 mg/dL


(70-99)


 


Lactic Acid Level


 


 


 


 1.2 mmol/L


(0.4-2.0)


 


Calcium Level


 


 


 


 9.1 mg/dL


(8.5-10.1)


 


Magnesium Level


 


 


 


 2.4 mg/dL


(1.8-2.4)


 


Total Bilirubin


 


 


 


 0.4 mg/dL


(0.2-1.0)


 


Aspartate Amino Transf


(AST/SGOT) 


 


 


 9 U/L (15-37) 





 


Alanine Aminotransferase


(ALT/SGPT) 


 


 


 15 U/L (16-63) 





 


Alkaline Phosphatase


 


 


 


 65 U/L


()


 


Creatine Kinase


 


 


 


 50 U/L


()


 


Creatine Kinase MB (Mass)


 


 


 


 0.5 ng/mL


(0.0-3.6)


 


Creatine Kinase MB Relative


Index 


 


 


  % (0-4) 





 


Troponin I High Sensitivity    25 ng/L (4-75) 


 


NT-Pro-B-Type Natriuretic


Peptide 


 


 


 39728 pg/mL


(0-124)


 


Total Protein


 


 


 


 7.7 g/dL


(6.4-8.2)


 


Albumin


 


 


 


 3.5 g/dL


(3.4-5.0)


 


Albumin/Globulin Ratio    0.8 (1.0-1.7) 


 


Lipase


 


 


 


 36 U/L


()


 


Test


 11/12/21


21:15 11/12/21


23:20 11/13/21


03:45 11/13/21


07:30


 


Glucose (Fingerstick)


 89 mg/dL


(70-99) 


 


 163 mg/dL


(70-99)


 


Troponin I High Sensitivity  37 ng/L (4-75)  36 ng/L (4-75)  


 


White Blood Count


 


 


 4.5 x10^3/uL


(4.0-11.0) 





 


Red Blood Count


 


 


 2.36 x10^6/uL


(4.30-5.70) 





 


Hemoglobin


 


 


 8.4 g/dL


(13.0-17.5) 





 


Hematocrit


 


 


 24.9 %


(39.0-53.0) 





 


Mean Corpuscular Volume


 


 


 105 fL


() 





 


Mean Corpuscular Hemoglobin   35 pg (25-35)  


 


Mean Corpuscular Hemoglobin


Concent 


 


 34 g/dL


(31-37) 





 


Red Cell Distribution Width


 


 


 16.1 %


(11.5-14.5) 





 


Platelet Count


 


 


 129 x10^3/uL


(140-400) 





 


Neutrophils (%) (Auto)   87 % (31-73)  


 


Lymphocytes (%) (Auto)   11 % (24-48)  


 


Monocytes (%) (Auto)   2 % (0-9)  


 


Eosinophils (%) (Auto)   0 % (0-3)  


 


Basophils (%) (Auto)   0 % (0-3)  


 


Neutrophils # (Auto)


 


 


 3.9 x10^3/uL


(1.8-7.7) 





 


Lymphocytes # (Auto)


 


 


 0.5 x10^3/uL


(1.0-4.8) 





 


Monocytes # (Auto)


 


 


 0.1 x10^3/uL


(0.0-1.1) 





 


Eosinophils # (Auto)


 


 


 0.0 x10^3/uL


(0.0-0.7) 





 


Basophils # (Auto)


 


 


 0.0 x10^3/uL


(0.0-0.2) 





 


Sodium Level


 


 


 139 mmol/L


(136-145) 





 


Potassium Level


 


 


 6.1 mmol/L


(3.5-5.1) 





 


Chloride Level


 


 


 100 mmol/L


() 





 


Carbon Dioxide Level


 


 


 31 mmol/L


(21-32) 





 


Anion Gap   8 (6-14)  


 


Blood Urea Nitrogen


 


 


 39 mg/dL


(8-26) 





 


Creatinine


 


 


 10.0 mg/dL


(0.7-1.3) 





 


Estimated GFR


(Cockcroft-Gault) 


 


 6.3 


 





 


BUN/Creatinine Ratio   4 (6-20)  


 


Glucose Level


 


 


 177 mg/dL


(70-99) 





 


Calcium Level


 


 


 9.0 mg/dL


(8.5-10.1) 





 


Iron Level


 


 


 21 ug/dL


() 





 


Total Iron Binding Capacity


 


 


 159 ug/dL


(250-450) 





 


Iron Saturation   13 % (15-34)  


 


Total Bilirubin


 


 


 0.4 mg/dL


(0.2-1.0) 





 


Aspartate Amino Transf


(AST/SGOT) 


 


 7 U/L (15-37) 


 





 


Alanine Aminotransferase


(ALT/SGPT) 


 


 13 U/L (16-63) 


 





 


Alkaline Phosphatase


 


 


 65 U/L


() 





 


Total Protein


 


 


 7.8 g/dL


(6.4-8.2) 





 


Albumin


 


 


 3.3 g/dL


(3.4-5.0) 





 


Albumin/Globulin Ratio   0.7 (1.0-1.7)  








Laboratory Tests








Test


 11/12/21


17:08 11/12/21


17:17 11/12/21


17:47 11/12/21


18:30


 


O2 Saturation 99 % (92-99)    


 


Arterial Blood pH


 7.38


(7.35-7.45) 


 


 





 


Arterial Blood pCO2 at


Patient Temp 47 mmHg


(35-46) 


 


 





 


Arterial Blood pO2 at Patient


Temp 237 mmHg


() 


 


 





 


Arterial Blood HCO3


 27 mmol/L


(21-28) 


 


 





 


Arterial Blood Base Excess


 2 mmol/L


(-3-3) 


 


 





 


Oxyhemoglobin 98.4 %    


 


Methemoglobin


 0.2 %


(0.0-1.9) 


 


 





 


Carbon Monoxide, Quantitative


 0.3 %


(0.0-1.9) 


 


 





 


FiO2 60 bipap    


 


Influenza Type A Antigen


 


 Negative


(NEGATIVE) 


 





 


Influenza Type B Antigen


 


 Negative


(NEGATIVE) 


 





 


SARS-CoV-2 Antigen (Rapid)


 


 


 Negative


(NEGATIVE) 





 


White Blood Count


 


 


 


 8.0 x10^3/uL


(4.0-11.0)


 


Red Blood Count


 


 


 


 2.35 x10^6/uL


(4.30-5.70)


 


Hemoglobin


 


 


 


 8.2 g/dL


(13.0-17.5)


 


Hematocrit


 


 


 


 24.8 %


(39.0-53.0)


 


Mean Corpuscular Volume


 


 


 


 106 fL


()


 


Mean Corpuscular Hemoglobin    35 pg (25-35) 


 


Mean Corpuscular Hemoglobin


Concent 


 


 


 33 g/dL


(31-37)


 


Red Cell Distribution Width


 


 


 


 16.1 %


(11.5-14.5)


 


Platelet Count


 


 


 


 135 x10^3/uL


(140-400)


 


Neutrophils (%) (Auto)    82 % (31-73) 


 


Lymphocytes (%) (Auto)    10 % (24-48) 


 


Monocytes (%) (Auto)    7 % (0-9) 


 


Eosinophils (%) (Auto)    1 % (0-3) 


 


Basophils (%) (Auto)    0 % (0-3) 


 


Neutrophils # (Auto)


 


 


 


 6.5 x10^3/uL


(1.8-7.7)


 


Lymphocytes # (Auto)


 


 


 


 0.8 x10^3/uL


(1.0-4.8)


 


Monocytes # (Auto)


 


 


 


 0.5 x10^3/uL


(0.0-1.1)


 


Eosinophils # (Auto)


 


 


 


 0.1 x10^3/uL


(0.0-0.7)


 


Basophils # (Auto)


 


 


 


 0.0 x10^3/uL


(0.0-0.2)


 


Prothrombin Time


 


 


 


 14.2 SEC


(11.7-14.0)


 


Prothromb Time International


Ratio 


 


 


 1.1 (0.8-1.1) 





 


Sodium Level


 


 


 


 139 mmol/L


(136-145)


 


Potassium Level


 


 


 


 5.2 mmol/L


(3.5-5.1)


 


Chloride Level


 


 


 


 100 mmol/L


()


 


Carbon Dioxide Level


 


 


 


 31 mmol/L


(21-32)


 


Anion Gap    8 (6-14) 


 


Blood Urea Nitrogen


 


 


 


 33 mg/dL


(8-26)


 


Creatinine


 


 


 


 9.3 mg/dL


(0.7-1.3)


 


Estimated GFR


(Cockcroft-Gault) 


 


 


 6.8 





 


BUN/Creatinine Ratio    4 (6-20) 


 


Glucose Level


 


 


 


 118 mg/dL


(70-99)


 


Lactic Acid Level


 


 


 


 1.2 mmol/L


(0.4-2.0)


 


Calcium Level


 


 


 


 9.1 mg/dL


(8.5-10.1)


 


Magnesium Level


 


 


 


 2.4 mg/dL


(1.8-2.4)


 


Total Bilirubin


 


 


 


 0.4 mg/dL


(0.2-1.0)


 


Aspartate Amino Transf


(AST/SGOT) 


 


 


 9 U/L (15-37) 





 


Alanine Aminotransferase


(ALT/SGPT) 


 


 


 15 U/L (16-63) 





 


Alkaline Phosphatase


 


 


 


 65 U/L


()


 


Creatine Kinase


 


 


 


 50 U/L


()


 


Creatine Kinase MB (Mass)


 


 


 


 0.5 ng/mL


(0.0-3.6)


 


Creatine Kinase MB Relative


Index 


 


 


  % (0-4) 





 


Troponin I High Sensitivity    25 ng/L (4-75) 


 


NT-Pro-B-Type Natriuretic


Peptide 


 


 


 71864 pg/mL


(0-124)


 


Total Protein


 


 


 


 7.7 g/dL


(6.4-8.2)


 


Albumin


 


 


 


 3.5 g/dL


(3.4-5.0)


 


Albumin/Globulin Ratio    0.8 (1.0-1.7) 


 


Lipase


 


 


 


 36 U/L


()


 


Test


 11/12/21


21:15 11/12/21


23:20 11/13/21


03:45 11/13/21


07:30


 


Glucose (Fingerstick)


 89 mg/dL


(70-99) 


 


 163 mg/dL


(70-99)


 


Troponin I High Sensitivity  37 ng/L (4-75)  36 ng/L (4-75)  


 


White Blood Count


 


 


 4.5 x10^3/uL


(4.0-11.0) 





 


Red Blood Count


 


 


 2.36 x10^6/uL


(4.30-5.70) 





 


Hemoglobin


 


 


 8.4 g/dL


(13.0-17.5) 





 


Hematocrit


 


 


 24.9 %


(39.0-53.0) 





 


Mean Corpuscular Volume


 


 


 105 fL


() 





 


Mean Corpuscular Hemoglobin   35 pg (25-35)  


 


Mean Corpuscular Hemoglobin


Concent 


 


 34 g/dL


(31-37) 





 


Red Cell Distribution Width


 


 


 16.1 %


(11.5-14.5) 





 


Platelet Count


 


 


 129 x10^3/uL


(140-400) 





 


Neutrophils (%) (Auto)   87 % (31-73)  


 


Lymphocytes (%) (Auto)   11 % (24-48)  


 


Monocytes (%) (Auto)   2 % (0-9)  


 


Eosinophils (%) (Auto)   0 % (0-3)  


 


Basophils (%) (Auto)   0 % (0-3)  


 


Neutrophils # (Auto)


 


 


 3.9 x10^3/uL


(1.8-7.7) 





 


Lymphocytes # (Auto)


 


 


 0.5 x10^3/uL


(1.0-4.8) 





 


Monocytes # (Auto)


 


 


 0.1 x10^3/uL


(0.0-1.1) 





 


Eosinophils # (Auto)


 


 


 0.0 x10^3/uL


(0.0-0.7) 





 


Basophils # (Auto)


 


 


 0.0 x10^3/uL


(0.0-0.2) 





 


Sodium Level


 


 


 139 mmol/L


(136-145) 





 


Potassium Level


 


 


 6.1 mmol/L


(3.5-5.1) 





 


Chloride Level


 


 


 100 mmol/L


() 





 


Carbon Dioxide Level


 


 


 31 mmol/L


(21-32) 





 


Anion Gap   8 (6-14)  


 


Blood Urea Nitrogen


 


 


 39 mg/dL


(8-26) 





 


Creatinine


 


 


 10.0 mg/dL


(0.7-1.3) 





 


Estimated GFR


(Cockcroft-Gault) 


 


 6.3 


 





 


BUN/Creatinine Ratio   4 (6-20)  


 


Glucose Level


 


 


 177 mg/dL


(70-99) 





 


Calcium Level


 


 


 9.0 mg/dL


(8.5-10.1) 





 


Iron Level


 


 


 21 ug/dL


() 





 


Total Iron Binding Capacity


 


 


 159 ug/dL


(250-450) 





 


Iron Saturation   13 % (15-34)  


 


Total Bilirubin


 


 


 0.4 mg/dL


(0.2-1.0) 





 


Aspartate Amino Transf


(AST/SGOT) 


 


 7 U/L (15-37) 


 





 


Alanine Aminotransferase


(ALT/SGPT) 


 


 13 U/L (16-63) 


 





 


Alkaline Phosphatase


 


 


 65 U/L


() 





 


Total Protein


 


 


 7.8 g/dL


(6.4-8.2) 





 


Albumin


 


 


 3.3 g/dL


(3.4-5.0) 





 


Albumin/Globulin Ratio   0.7 (1.0-1.7)  








Review


All relevant outside records, renal labs, imaging studies, telemetry/EKG's were 

reviewed.





Images


Images


Chest x-ray 11/12/2021








IMPRESSION:


Patchy bilateral airspace disease favored be infectious or inflammatory in 

etiology.











MARIELA CRUZ MD               Nov 13, 2021 11:55

## 2021-11-13 NOTE — NUR
DISCHARGED PATIENT TO HOME. DISCHARGE INSTRUCTIONS GIVEN. PIV AND HEART MONITOR REMOVED. 
ESCORTED PATIENT OFF UNIT PER WHEELCHAIR INTO A PRIVATE VEHICLE.

## 2021-11-22 NOTE — PDOC3
Discharge Summary


Visit Information


Date of Admission:  Nov 12, 2021


Date of Discharge:  Nov 13, 2021


Final Diagnosis


acute hypercarbic respiratory failure


COPD with acute exacerbation, bronchitis





CAD, CHF,  Diabetes-Type II, Hypertension, 


End stage Renal Disease on HD,   with hyperkalemia,    





Chronic BACK/neck pain, OA


s/p lung lobectomy 1990, benign


 





 








Problems


Medical Problems:


(1) COPD exacerbation


Status: Acute  





(2) End stage renal disease


Status: Acute  





(3) Respiratory failure


Status: Acute  








Brief Hospital Course


Allergies





                                    Allergies








Coded Allergies Type Severity Reaction Last Updated Verified


 


  No Known Drug Allergies    4/22/20 No








Brief Hospital Course


Mr. Cruz  is a 70 old male, admit with cough, dyspnea,  weakness, 


steroids,  abx given,   dialysis done


he felt much improved and wanted DC home





Discharge Information


Condition at Discharge:  Improved


Follow Up:  Weeks


Disposition/Orders:  D/C to Home


Scheduled


Aspirin (Aspir 81) 81 Mg Tablet.dr, 1 TAB PO DAILY, #30 Ref 5 (Reported)


   Entered as Reported by: ANIRUDH MIRAMONTES on 8/22/17 1518


   Last Action: Continued on 11/12/21 2143 by Colleen Gallego


Atorvastatin Calcium (Atorvastatin Calcium) 10 Mg Tablet, 1 TAB PO HS for   , 

#30 Ref 5 (Reported)


   Entered as Reported by: Colleen Gallego on 11/12/21 2142


   Last Action: Edited on 11/12/21 2144 by Colleen Gallego


Carvedilol (Coreg  **) 6.25 Mg Tablet, 6.25 MG PO BIDWMEALS for CARDIAC, 

(Reported)


   Entered as Reported by: Colleen Gallego on 11/12/21 2142


   Last Action: Continued on 11/12/21 2143 by Colleen Gallego


Doxycycline Hyclate (Doxycycline Hyclate) 100 Mg Capsule, 1 CAP PO BID for 

LUNGS, #14 (Reported)


   Entered as Reported by: Evan Jose on 11/13/21 1531


Lisinopril (Lisinopril) 5 Mg Tablet, 1 TAB PO DAILY for   , #30 Ref 5 (Reported)


   Entered as Reported by: Colleen Gallego on 11/12/21 2142


   Last Action: Continued on 11/12/21 2143 by Colleen Gallego


Methylprednisolone (Medrol) 4 Mg Tab.ds.pk, 1 PKG PO UD for LUNGS, #1 (Reported)


   Entered as Reported by: Evan Jose on 11/13/21 1530


Sevelamer Carbonate (Renvela) 800 Mg Tablet, 1 TAB PO TID for binder, #270 Ref 3

(Reported)


   Entered as Reported by: CHARLY RALPH on 5/21/17 1535


   Last Action: Continued on 11/12/21 2143 by Colleen Gallego





Scheduled PRN


Oxycodone/Apap  (Percocet  Mg Tablet **) 1 Each Tablet, 1 TAB PO PRN

BID PRN for    MDD 2 Tablet(s) for 5 Days, #10 Ref 0 (Reported)


   Entered as Reported by: Colleen Gallego on 11/12/21 2142


   Last Action: Continued on 11/12/21 2143 by Colleen Gallego





Patient Instructions


Patient Instructions


PT seen that day,   full progress note,  was much better near 6pm and wanted DC 

home


face to face that day


> 30 min total time





Justicifation of Admission Dx:


Justifications for Admission:


Justification of Admission Dx:  N/A











ASIF KHAN MD                 Nov 22, 2021 09:01

## 2021-12-01 ENCOUNTER — HOSPITAL ENCOUNTER (OUTPATIENT)
Dept: HOSPITAL 61 - KCIC MRI | Age: 71
End: 2021-12-01
Attending: FAMILY MEDICINE
Payer: COMMERCIAL

## 2021-12-01 DIAGNOSIS — M51.27: ICD-10-CM

## 2021-12-01 DIAGNOSIS — M48.8X6: ICD-10-CM

## 2021-12-01 DIAGNOSIS — M51.46: ICD-10-CM

## 2021-12-01 DIAGNOSIS — M51.37: ICD-10-CM

## 2021-12-01 DIAGNOSIS — N28.1: ICD-10-CM

## 2021-12-01 DIAGNOSIS — M47.26: Primary | ICD-10-CM

## 2021-12-01 DIAGNOSIS — M41.86: ICD-10-CM

## 2021-12-01 DIAGNOSIS — G09: ICD-10-CM

## 2021-12-01 DIAGNOSIS — M48.07: ICD-10-CM

## 2021-12-01 PROCEDURE — 72148 MRI LUMBAR SPINE W/O DYE: CPT

## 2021-12-09 ENCOUNTER — HOSPITAL ENCOUNTER (OUTPATIENT)
Dept: HOSPITAL 61 - PNCL | Age: 71
Discharge: HOME | End: 2021-12-09
Attending: ANESTHESIOLOGY
Payer: COMMERCIAL

## 2021-12-09 DIAGNOSIS — I50.9: ICD-10-CM

## 2021-12-09 DIAGNOSIS — M19.90: ICD-10-CM

## 2021-12-09 DIAGNOSIS — Z87.891: ICD-10-CM

## 2021-12-09 DIAGNOSIS — E11.22: ICD-10-CM

## 2021-12-09 DIAGNOSIS — J44.9: ICD-10-CM

## 2021-12-09 DIAGNOSIS — M54.50: Primary | ICD-10-CM

## 2021-12-09 DIAGNOSIS — I25.10: ICD-10-CM

## 2021-12-09 DIAGNOSIS — I13.2: ICD-10-CM

## 2021-12-09 DIAGNOSIS — Z79.84: ICD-10-CM

## 2021-12-09 DIAGNOSIS — N18.6: ICD-10-CM

## 2021-12-09 DIAGNOSIS — Z79.899: ICD-10-CM

## 2021-12-09 DIAGNOSIS — M79.605: ICD-10-CM

## 2021-12-09 DIAGNOSIS — Z79.82: ICD-10-CM

## 2021-12-09 DIAGNOSIS — Z98.890: ICD-10-CM

## 2021-12-09 DIAGNOSIS — E78.00: ICD-10-CM

## 2021-12-09 PROCEDURE — G0463 HOSPITAL OUTPT CLINIC VISIT: HCPCS

## 2021-12-09 PROCEDURE — 99214 OFFICE O/P EST MOD 30 MIN: CPT

## 2021-12-09 NOTE — PDOC1
INITIAL PAIN CONSULT


DATE OF SERVICE:


DOS:


DATE: 12/9/21 


TIME: 10:15





CHIEF COMPLAINT:


Chief Complaint:


Low back and left lower extremity pain





HISTORY OF PRESENT ILLNESS:


70-year-old male presents history of pain low back and left lower extremity for 

about 2 years not the result of any specific injury or accident that he is aware

of is getting worse with time rating the posterior back posterior thigh calf and

to the foot occasionally but mostly in the low back patient reports he only walk

about 20 yards when he has to sit down and rest before going on a patient 

reports when he does sit down it takes about 1 to 2 minutes for the pain is 

better than he can continue on he is using a cane also has a walker that he uses

as well patient reports it is worse with walking standing changing positions 

disturbed sleeping a sleeping with his foot on the ground in a sitting position 

or leaning to his right side to get the pressure off of the left side and sits 

that way on his exam today as well.  Patient has tried methocarbamol as well as 

oral analgesics mostly Tylenol he is doing some exercises stretching 

strengthening exercises from his primary care physician has had no formal 

physical therapy at this point but has had some chiropractic treatment the past 

was not helpful as well.  Patient scribes pain is constant sharp stabbing 

throbbing and shooting in the left leg posterior gluteus posterior thigh 

posterior calf and into the foot on occasion.  Patient reports no bowel or 

bladder incontinence prefix is walking significantly.  Patient is been taking 

methocarbamol as well as over-the-counter Tylenol without significant reduction 

in pain.  Patient rates his disability rating 0-10 10 being worst a 10 in all 

categories especially self-care with sitting and light support activities 

walking and sleeping.





PAST MEDICAL HISTORY:


PMH:


Arthritis, hypertension, shortness of breath, diabetes, chronic renal failure 

with dialysis





PREVIOUS SURGERIES:


Past Surgical Hx:


Coronary artery bypass grafting, AV shunt left upper extremity, right lung 

middle and lower lobe resection





CURRENT MEDICATIONS:


Current Meds:





Active Scripts








 Medications  Dose


 Route/Sig


 Max Daily Dose Days Date Category


 


 Methocarbamol 500


 Mg Tablet  1,000 Mg


 PO Q8HRS


   12/9/21 Reported


 


 Atorvastatin


 Calcium 10 Mg


 Tablet  1 Tab


 PO HS


   11/12/21 Reported


 


 Coreg  **


  (Carvedilol) 6.25


 Mg Tablet  6.25 Mg


 PO BIDWMEALS


   11/12/21 Reported


 


 Percocet 


 Mg Tablet **


  (Oxycodone/Acetaminophen)


 1 Each Tablet  1 Tab


 PO PRN BID PRN


 MDD 2 Tablet(s)  5 11/12/21 Reported


 


 Aspir 81


  (Aspirin) 81 Mg


 Tablet.dr  1 Tab


 PO DAILY


   8/22/17 Reported


 


 Renvela


  (Sevelamer


 Carbonate) 800 Mg


 Tablet  1 Tab


 PO TID


   5/21/17 Reported











ALLERGIES;


Allergies:  


Coded Allergies:  


     No Known Drug Allergies (Unverified , 4/22/20)





FAMILY HISTORY:


Family Hx:


Hypertension





SOCIAL HISTORY:


Social Hx:


Patient is under alcohol does not smoke says any illegal illicit recreational 

drugs patient is a  lives locally in [facility]





REVIEW OF SYSTEMS:


ROS:


Positive for those items mentioned in history of present illness, all systems 

are reviewed, otherwise negative ,and are complete full and well-documented on 

patient's chart.





PHYSICAL EXAM:


VS:


Blood pressure is 105/59 pulse 81 respirations 18 temperature 97.5 F height 5 

feet 10 inches weight is 223 pounds


PE:


PHYSICAL EXAMINATION:





GENERAL: The patient is awake, alert, oriented, appropriate, very pleasant in 

demeanor


HEENT: Shows normocephalic, atraumatic.  Extraocular movements are intact and 

symmetrical.  Oral cavity: Mucous membranes moist and pink. 


NECK: Shows anterior throat supple without palpable lymphadenopathy noted.  

Swallow reflex symmetrical.


CHEST: Shows normal on inspection.  Breath sounds are clear bilaterally, distant

but no rales rhonchi or wheezes auscultated.


HEART: Shows S1, S2 clear.  No murmurs auscultated.


ABDOMEN: Soft, nontender, nondistended, obese.  No palpable organomegaly is 

noted.  No rebound or guarding demonstrated.


BACK: Shows spine grossly in the midline.  Normal-appearing cervical lordotic 

curvature.  There is slightly increased thoracic kyphosis, some flattening of 

the lumbar lordotic curvature.  Lumbar paraspinous muscles show symmetrical on 

inspection, on palpation shows some moderate tenderness diffusely throughout the

upper, middle and lower distribution of the paraspinous muscles bilaterally and 

also into the lower thoracic paraspinous musculature, firm and tender, but 

without specific trigger points, without radiation of pain.  The patient has 

good rotational motion of the lumbar spine, both laterally as well as extension 

and flexion without significant difficulty.  No tenderness over the spinous 

processes, sacrum or sacroiliac regions.


EXTREMITIES: Lower extremities show deep tendon reflexes 1 to in the patellar 

and tendo calcaneus tendons.  Motor exam is 5 on a scale of 5 with right 

dorsiflexion, extension, quadriceps and hamstring flexion and 4/5 on the left.  

Peripheral pulses are 1 posterior tibial.  1+ peripheral edema is noted 

bilaterally.  Lower extremities are warm and dry to touch, equal in color and 

appearance.  Straight leg raise noted to be positive on the left at 

approximately 30 degrees, right side is negative.  Gaenslen's and Jeremy's 

maneuvers are negative bilaterally.  The patient is able to stand but needs help

getting up from seated position using his cane in the arms of the chair walks 

with a significant limping gait favoring the left lower extremity and walks to 

his right side cane is in his right hand.


SKIN: Shows warm and dry, good turgor.  No edema.  No sores, rashes or bruising 

throughout.





IMPRESSION:


Impression:


70-year-old male with approximate 2-year history of pain low back left lower 

extremity radicular fashion following L5-S1 dermatomal distribution.


MRI scan lumbar spine showing cervical disc bulge asymmetric to the left at L5-

S1 with severe left and mild right neuroforaminal stenosis also moderate 

advanced lumbar spondylosis mild right and mild left foraminal stenosis L4-5 as 

well


Arthritis


Hypertension


Diabetes


Chronic renal failure with dialysis





Plan: Options were discussed with the patient including serve medical 

managements continued physical therapies and interventional techniques.  Patient

like to pursue interventional techniques as he is still taking oral analgesics 

as well as muscle relaxants and doing stretching strength exercise without 

significant decrease in pain.  We discussed a lumbar epidural steroid injection 

description as well as anatomical models to describe the procedure.  We will 

wait for preauthorization with insurance provider, once obtained we will have 

him return for a translaminar L5-S1 lumbar epidural steroid injection with 

fluoroscopic guidance.  In the meantime, patient will continue with stretching 

and strength exercises, and oral analgesics as currently.











SAEED CASTANEDA MD                Dec 9, 2021 10:23

## 2021-12-22 ENCOUNTER — HOSPITAL ENCOUNTER (OUTPATIENT)
Dept: HOSPITAL 61 - PNCL | Age: 71
Discharge: HOME | End: 2021-12-22
Attending: ANESTHESIOLOGY
Payer: COMMERCIAL

## 2021-12-22 DIAGNOSIS — Z79.82: ICD-10-CM

## 2021-12-22 DIAGNOSIS — I50.9: ICD-10-CM

## 2021-12-22 DIAGNOSIS — M51.16: Primary | ICD-10-CM

## 2021-12-22 DIAGNOSIS — J44.9: ICD-10-CM

## 2021-12-22 DIAGNOSIS — I11.0: ICD-10-CM

## 2021-12-22 DIAGNOSIS — E11.9: ICD-10-CM

## 2021-12-22 DIAGNOSIS — Z87.891: ICD-10-CM

## 2021-12-22 DIAGNOSIS — E78.00: ICD-10-CM

## 2021-12-22 DIAGNOSIS — Z79.899: ICD-10-CM

## 2021-12-22 DIAGNOSIS — I25.10: ICD-10-CM

## 2021-12-22 DIAGNOSIS — Z98.890: ICD-10-CM

## 2021-12-22 DIAGNOSIS — M48.061: ICD-10-CM

## 2021-12-22 DIAGNOSIS — Z79.84: ICD-10-CM

## 2021-12-22 PROCEDURE — 62323 NJX INTERLAMINAR LMBR/SAC: CPT
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
## 2021-12-22 NOTE — PDOC4
Procedure Note:


ICD 10 Code:


ICD 10 Code:


M54.17


M5 127


M4 8.07





Procedure Note:


Patient was consented for lumbar epidural steroid injection fluoroscopic 

guidance.  Risks were discussed including but not limited to: Bleeding, 

infection, possibility of epidural hematoma and subsequent neurological 

compromise, dural puncture, headaches, spinal cord and/or nerve damage, side 

effects of steroid medication, and poor results regarding pain control.  Patient

understands and wished to proceed.


Procedure is lumbar epidural steroid injection under local anesthetic using 

sterile prep and drape at the L5-S1 level using C-arm fluoroscopic guidance in 

both AP and lateral views medications injected is 120 mg Depo-Medrol +10mL 

preservative-free normal saline and 2 mL contrast- condition at discharge is 

stable patient tolerated procedure well had no complications.











SAEED CASTANEDA MD               Dec 22, 2021 13:43

## 2022-05-26 ENCOUNTER — HOSPITAL ENCOUNTER (EMERGENCY)
Dept: HOSPITAL 61 - ER | Age: 72
Discharge: HOME | End: 2022-05-26
Payer: COMMERCIAL

## 2022-05-26 VITALS — BODY MASS INDEX: 31.69 KG/M2 | WEIGHT: 221.34 LBS | HEIGHT: 70 IN

## 2022-05-26 VITALS — DIASTOLIC BLOOD PRESSURE: 78 MMHG | SYSTOLIC BLOOD PRESSURE: 125 MMHG

## 2022-05-26 DIAGNOSIS — Z95.1: ICD-10-CM
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
## 2022-05-26 DIAGNOSIS — Z99.2: ICD-10-CM
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
## 2022-05-26 DIAGNOSIS — T20.24XA: Primary | ICD-10-CM
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
## 2022-05-26 DIAGNOSIS — Z87.891: ICD-10-CM

## 2022-05-26 DIAGNOSIS — I50.9: ICD-10-CM
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
## 2022-05-26 DIAGNOSIS — E11.22: ICD-10-CM

## 2022-05-26 DIAGNOSIS — I25.10: ICD-10-CM

## 2022-05-26 DIAGNOSIS — I13.2: ICD-10-CM

## 2022-05-26 DIAGNOSIS — N18.6: ICD-10-CM

## 2022-05-26 DIAGNOSIS — R06.02: ICD-10-CM

## 2022-05-26 PROCEDURE — 99284 EMERGENCY DEPT VISIT MOD MDM: CPT
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
## 2022-05-27 ENCOUNTER — HOSPITAL ENCOUNTER (INPATIENT)
Dept: HOSPITAL 61 - ER | Age: 72
LOS: 2 days | Discharge: HOME | DRG: 640 | End: 2022-05-29
Attending: STUDENT IN AN ORGANIZED HEALTH CARE EDUCATION/TRAINING PROGRAM | Admitting: STUDENT IN AN ORGANIZED HEALTH CARE EDUCATION/TRAINING PROGRAM
Payer: COMMERCIAL

## 2022-05-27 VITALS — DIASTOLIC BLOOD PRESSURE: 66 MMHG | SYSTOLIC BLOOD PRESSURE: 132 MMHG

## 2022-05-27 VITALS — SYSTOLIC BLOOD PRESSURE: 120 MMHG | DIASTOLIC BLOOD PRESSURE: 69 MMHG

## 2022-05-27 VITALS — BODY MASS INDEX: 31.78 KG/M2 | HEIGHT: 70 IN | WEIGHT: 222.01 LBS

## 2022-05-27 DIAGNOSIS — Z83.3: ICD-10-CM

## 2022-05-27 DIAGNOSIS — Z82.49: ICD-10-CM

## 2022-05-27 DIAGNOSIS — Z99.2: ICD-10-CM

## 2022-05-27 DIAGNOSIS — N18.6: ICD-10-CM

## 2022-05-27 DIAGNOSIS — Z95.1: ICD-10-CM

## 2022-05-27 DIAGNOSIS — J44.9: ICD-10-CM

## 2022-05-27 DIAGNOSIS — E21.3: ICD-10-CM

## 2022-05-27 DIAGNOSIS — D53.9: ICD-10-CM

## 2022-05-27 DIAGNOSIS — I50.9: ICD-10-CM

## 2022-05-27 DIAGNOSIS — T20.00XA: ICD-10-CM

## 2022-05-27 DIAGNOSIS — E11.22: ICD-10-CM

## 2022-05-27 DIAGNOSIS — I13.2: ICD-10-CM

## 2022-05-27 DIAGNOSIS — E87.5: Primary | ICD-10-CM

## 2022-05-27 DIAGNOSIS — K21.9: ICD-10-CM

## 2022-05-27 DIAGNOSIS — G89.29: ICD-10-CM

## 2022-05-27 DIAGNOSIS — I25.10: ICD-10-CM

## 2022-05-27 DIAGNOSIS — D69.6: ICD-10-CM

## 2022-05-27 DIAGNOSIS — E78.5: ICD-10-CM

## 2022-05-27 DIAGNOSIS — J96.01: ICD-10-CM
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
## 2022-05-27 LAB
ALBUMIN SERPL-MCNC: 3.4 G/DL (ref 3.4–5)
ALBUMIN/GLOB SERPL: 0.8 {RATIO} (ref 1–1.7)
ALP SERPL-CCNC: 83 U/L (ref 46–116)
ALT SERPL-CCNC: 45 U/L (ref 16–63)
ANION GAP SERPL CALC-SCNC: 19 MMOL/L (ref 6–14)
AST SERPL-CCNC: 42 U/L (ref 15–37)
BASOPHILS # BLD AUTO: 0 X10^3/UL (ref 0–0.2)
BASOPHILS NFR BLD: 1 % (ref 0–3)
BILIRUB SERPL-MCNC: 0.9 MG/DL (ref 0.2–1)
BUN SERPL-MCNC: 57 MG/DL (ref 8–26)
BUN/CREAT SERPL: 6 (ref 6–20)
CALCIUM SERPL-MCNC: 7.9 MG/DL (ref 8.5–10.1)
CHLORIDE SERPL-SCNC: 98 MMOL/L (ref 98–107)
CO2 SERPL-SCNC: 23 MMOL/L (ref 21–32)
CREAT SERPL-MCNC: 9.5 MG/DL (ref 0.7–1.3)
EOSINOPHIL NFR BLD: 0 % (ref 0–3)
EOSINOPHIL NFR BLD: 0 X10^3/UL (ref 0–0.7)
ERYTHROCYTE [DISTWIDTH] IN BLOOD BY AUTOMATED COUNT: 18 % (ref 11.5–14.5)
GFR SERPLBLD BASED ON 1.73 SQ M-ARVRAT: 6.6 ML/MIN
GLUCOSE SERPL-MCNC: 67 MG/DL (ref 70–99)
HCT VFR BLD CALC: 29.8 % (ref 39–53)
HGB BLD-MCNC: 10.2 G/DL (ref 13–17.5)
LYMPHOCYTES # BLD: 0.8 X10^3/UL (ref 1–4.8)
LYMPHOCYTES NFR BLD AUTO: 13 % (ref 24–48)
MCH RBC QN AUTO: 35 PG (ref 25–35)
MCHC RBC AUTO-ENTMCNC: 34 G/DL (ref 31–37)
MCV RBC AUTO: 103 FL (ref 79–100)
MONO #: 0.5 X10^3/UL (ref 0–1.1)
MONOCYTES NFR BLD: 9 % (ref 0–9)
NEUT #: 4.7 X10^3/UL (ref 1.8–7.7)
NEUTROPHILS NFR BLD AUTO: 78 % (ref 31–73)
PLATELET # BLD AUTO: 130 X10^3/UL (ref 140–400)
POTASSIUM SERPL-SCNC: 5.9 MMOL/L (ref 3.5–5.1)
PROT SERPL-MCNC: 7.7 G/DL (ref 6.4–8.2)
RBC # BLD AUTO: 2.89 X10^6/UL (ref 4.3–5.7)
SODIUM SERPL-SCNC: 140 MMOL/L (ref 136–145)
WBC # BLD AUTO: 6.1 X10^3/UL (ref 4–11)

## 2022-05-27 PROCEDURE — 80048 BASIC METABOLIC PNL TOTAL CA: CPT

## 2022-05-27 PROCEDURE — 84484 ASSAY OF TROPONIN QUANT: CPT

## 2022-05-27 PROCEDURE — 85025 COMPLETE CBC W/AUTO DIFF WBC: CPT

## 2022-05-27 PROCEDURE — 5A1D70Z PERFORMANCE OF URINARY FILTRATION, INTERMITTENT, LESS THAN 6 HOURS PER DAY: ICD-10-PCS | Performed by: STUDENT IN AN ORGANIZED HEALTH CARE EDUCATION/TRAINING PROGRAM

## 2022-05-27 PROCEDURE — 36415 COLL VENOUS BLD VENIPUNCTURE: CPT

## 2022-05-27 PROCEDURE — 93005 ELECTROCARDIOGRAM TRACING: CPT

## 2022-05-27 PROCEDURE — 83880 ASSAY OF NATRIURETIC PEPTIDE: CPT

## 2022-05-27 PROCEDURE — 82962 GLUCOSE BLOOD TEST: CPT

## 2022-05-27 PROCEDURE — G0378 HOSPITAL OBSERVATION PER HR: HCPCS

## 2022-05-27 PROCEDURE — 71046 X-RAY EXAM CHEST 2 VIEWS: CPT

## 2022-05-27 PROCEDURE — 85027 COMPLETE CBC AUTOMATED: CPT

## 2022-05-27 PROCEDURE — 71045 X-RAY EXAM CHEST 1 VIEW: CPT

## 2022-05-27 PROCEDURE — 80053 COMPREHEN METABOLIC PANEL: CPT

## 2022-05-27 RX ADMIN — CARVEDILOL SCH MG: 6.25 TABLET, FILM COATED ORAL at 20:57

## 2022-05-27 RX ADMIN — HEPARIN SODIUM SCH UNIT: 5000 INJECTION, SOLUTION INTRAVENOUS; SUBCUTANEOUS at 21:01

## 2022-05-27 RX ADMIN — SEVELAMER CARBONATE SCH MG: 800 TABLET, FILM COATED ORAL at 21:00

## 2022-05-27 RX ADMIN — METHOCARBAMOL SCH MG: 500 TABLET ORAL at 20:55

## 2022-05-27 RX ADMIN — METHOCARBAMOL SCH MG: 500 TABLET ORAL at 21:00

## 2022-05-27 RX ADMIN — ATORVASTATIN CALCIUM SCH MG: 10 TABLET, FILM COATED ORAL at 20:56

## 2022-05-27 RX ADMIN — METHOCARBAMOL SCH MG: 500 TABLET ORAL at 16:00
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
## 2022-05-27 NOTE — RAD
XR CHEST 1V



History: Reason: SOA / Spl. Instructions:  / History: 



Comparison: May 20, 2022



Findings:

Moderate diffuse interstitial thickening with ill-defined opacities. Cardiomegaly. Small right pleura
l effusion. No pneumothorax. Prior median sternotomy. Prior granulomatous disease within the chest. P
rior lower thoracic vertebral augmentation.



Impression: 

1.  Moderate diffuse interstitial thickening with ill-defined opacities, concerning for edema.

2.  Cardiomegaly and small right pleural effusion.



Electronically signed by: Apollo Celestin DO (5/27/2022 2:18 PM) [id number]

## 2022-05-28 VITALS — DIASTOLIC BLOOD PRESSURE: 64 MMHG | SYSTOLIC BLOOD PRESSURE: 115 MMHG

## 2022-05-28 VITALS — DIASTOLIC BLOOD PRESSURE: 58 MMHG | SYSTOLIC BLOOD PRESSURE: 114 MMHG

## 2022-05-28 VITALS — DIASTOLIC BLOOD PRESSURE: 63 MMHG | SYSTOLIC BLOOD PRESSURE: 114 MMHG

## 2022-05-28 VITALS — SYSTOLIC BLOOD PRESSURE: 134 MMHG | DIASTOLIC BLOOD PRESSURE: 77 MMHG

## 2022-05-28 VITALS — DIASTOLIC BLOOD PRESSURE: 71 MMHG | SYSTOLIC BLOOD PRESSURE: 106 MMHG

## 2022-05-28 LAB
ANION GAP SERPL CALC-SCNC: 13 MMOL/L (ref 6–14)
BUN SERPL-MCNC: 46 MG/DL (ref 8–26)
CALCIUM SERPL-MCNC: 8.5 MG/DL (ref 8.5–10.1)
CHLORIDE SERPL-SCNC: 98 MMOL/L (ref 98–107)
CO2 SERPL-SCNC: 27 MMOL/L (ref 21–32)
CREAT SERPL-MCNC: 7.3 MG/DL (ref 0.7–1.3)
ERYTHROCYTE [DISTWIDTH] IN BLOOD BY AUTOMATED COUNT: 18.6 % (ref 11.5–14.5)
GFR SERPLBLD BASED ON 1.73 SQ M-ARVRAT: 9 ML/MIN
GLUCOSE SERPL-MCNC: 116 MG/DL (ref 70–99)
HCT VFR BLD CALC: 30.9 % (ref 39–53)
HGB BLD-MCNC: 10.4 G/DL (ref 13–17.5)
MCH RBC QN AUTO: 34 PG (ref 25–35)
MCHC RBC AUTO-ENTMCNC: 34 G/DL (ref 31–37)
MCV RBC AUTO: 102 FL (ref 79–100)
PLATELET # BLD AUTO: 141 X10^3/UL (ref 140–400)
POTASSIUM SERPL-SCNC: 4.5 MMOL/L (ref 3.5–5.1)
RBC # BLD AUTO: 3.02 X10^6/UL (ref 4.3–5.7)
SODIUM SERPL-SCNC: 138 MMOL/L (ref 136–145)
WBC # BLD AUTO: 4.8 X10^3/UL (ref 4–11)

## 2022-05-28 PROCEDURE — 5A1D70Z PERFORMANCE OF URINARY FILTRATION, INTERMITTENT, LESS THAN 6 HOURS PER DAY: ICD-10-PCS | Performed by: STUDENT IN AN ORGANIZED HEALTH CARE EDUCATION/TRAINING PROGRAM

## 2022-05-28 RX ADMIN — METHOCARBAMOL SCH MG: 500 TABLET ORAL at 06:01

## 2022-05-28 RX ADMIN — METHOCARBAMOL SCH MG: 500 TABLET ORAL at 13:56

## 2022-05-28 RX ADMIN — HEPARIN SODIUM SCH UNIT: 5000 INJECTION, SOLUTION INTRAVENOUS; SUBCUTANEOUS at 09:05

## 2022-05-28 RX ADMIN — HEPARIN SODIUM SCH UNIT: 5000 INJECTION, SOLUTION INTRAVENOUS; SUBCUTANEOUS at 20:37

## 2022-05-28 RX ADMIN — OXYMETAZOLINE HYDROCHLORIDE SCH SPRAY: 5 SPRAY NASAL at 20:27

## 2022-05-28 RX ADMIN — ASPIRIN SCH MG: 81 TABLET, COATED ORAL at 13:55

## 2022-05-28 RX ADMIN — SEVELAMER CARBONATE SCH MG: 800 TABLET, FILM COATED ORAL at 09:02

## 2022-05-28 RX ADMIN — ATORVASTATIN CALCIUM SCH MG: 10 TABLET, FILM COATED ORAL at 20:28

## 2022-05-28 RX ADMIN — METHOCARBAMOL SCH MG: 500 TABLET ORAL at 20:28

## 2022-05-28 RX ADMIN — SEVELAMER CARBONATE SCH MG: 800 TABLET, FILM COATED ORAL at 13:55

## 2022-05-28 RX ADMIN — OXYMETAZOLINE HYDROCHLORIDE SCH SPRAY: 5 SPRAY NASAL at 17:23

## 2022-05-28 RX ADMIN — CARVEDILOL SCH MG: 6.25 TABLET, FILM COATED ORAL at 13:55

## 2022-05-28 RX ADMIN — CARVEDILOL SCH MG: 6.25 TABLET, FILM COATED ORAL at 17:22

## 2022-05-28 RX ADMIN — SEVELAMER CARBONATE SCH MG: 800 TABLET, FILM COATED ORAL at 17:22
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
## 2022-05-28 NOTE — RAD
PA and lateral chest.



HISTORY: Pulmonary edema



PA and lateral views were taken of the chest. The heart is enlarged. The aorta is tortuous. There is 
mild vascular congestion. There are no confluent areas of infiltrate. There is no definite pleural ef
fusion. Patient's had previous coronary bypass. There is linear scarring in the right lung base and l
eft midlung.



IMPRESSION:

1. Prior bypass.

2. Mild cardiac enlargement.

3. Linear scarring in each lung, no confluent infiltrates.



Electronically signed by: Damion Perez MD (5/28/2022 3:14 PM) [id number]

## 2022-05-28 NOTE — PDOC
[facility] [facility] PROGRESS NOTE


Date of Service


DOS:


DATE: 5/28/22 


TIME: 11:23





Chief Complaint


Chief Complaint


Acute respiratory failure with hypoxia


ESRD on HD Monday/Wednesday/Friday


Acute volume overload due to missed HD


Macrocytic anemia


Thrombocytopenia


COPD/asthma


CAD





Plan:


Will place consultation to nephrology


Patient will likely require HD today or tomorrow based on potassium and fluid 

status.


Resume home medications


FEN - Renal diet


PPX  - Heparin


FULL CODE/surrogate decision-maker is his sister (Meghan Tran)


Dispo - inpatient for above





History of Present Illness


History of Present Illness


5/28


Patient evaluated examined at bedside.  Planning for dialysis today.  Still some

shortness of breath likely due to fluid status.  If respiratory status improved 

after dialysis could potentially discharge home and resume normal regimen on 

Monday.  Follow-up after treatment.





Vitals/I&O


Vitals/I&O:





                                   Vital Signs








  Date Time  Temp Pulse Resp B/P (MAP) Pulse Ox O2 Delivery O2 Flow Rate FiO2


 


5/28/22 07:00 98.5 68 18 115/64 (81) 92 Room Air  





 98.5       


 


5/27/22 15:58       2.0 














                                    I & O   


 


 5/27/22 5/27/22 5/28/22





 15:00 23:00 07:00


 


Intake Total  120 ml 240 ml


 


Balance  120 ml 240 ml











Physical Exam


General:  Alert, Oriented X3, Cooperative


Heart:  Regular rate


Lungs:  Clear, Other


Abdomen:  Normal bowel sounds, Soft, No tenderness


Extremities:  No edema, Normal pulses


Skin:  No significant lesion





Labs


Labs:





Laboratory Tests








Test


 5/27/22


14:40 5/28/22


08:05 5/28/22


08:53


 


White Blood Count


 6.1 x10^3/uL


(4.0-11.0) 4.8 x10^3/uL


(4.0-11.0) 





 


Red Blood Count


 2.89 x10^6/uL


(4.30-5.70) 3.02 x10^6/uL


(4.30-5.70) 





 


Hemoglobin


 10.2 g/dL


(13.0-17.5) 10.4 g/dL


(13.0-17.5) 





 


Hematocrit


 29.8 %


(39.0-53.0) 30.9 %


(39.0-53.0) 





 


Mean Corpuscular Volume


 103 fL


() 102 fL


() 





 


Mean Corpuscular Hemoglobin 35 pg (25-35)  34 pg (25-35)  


 


Mean Corpuscular Hemoglobin


Concent 34 g/dL


(31-37) 34 g/dL


(31-37) 





 


Red Cell Distribution Width


 18.0 %


(11.5-14.5) 18.6 %


(11.5-14.5) 





 


Platelet Count


 130 x10^3/uL


(140-400) 141 x10^3/uL


(140-400) 





 


Neutrophils (%) (Auto) 78 % (31-73)   


 


Lymphocytes (%) (Auto) 13 % (24-48)   


 


Monocytes (%) (Auto) 9 % (0-9)   


 


Eosinophils (%) (Auto) 0 % (0-3)   


 


Basophils (%) (Auto) 1 % (0-3)   


 


Neutrophils # (Auto)


 4.7 x10^3/uL


(1.8-7.7) 


 





 


Lymphocytes # (Auto)


 0.8 x10^3/uL


(1.0-4.8) 


 





 


Monocytes # (Auto)


 0.5 x10^3/uL


(0.0-1.1) 


 





 


Eosinophils # (Auto)


 0.0 x10^3/uL


(0.0-0.7) 


 





 


Basophils # (Auto)


 0.0 x10^3/uL


(0.0-0.2) 


 





 


Sodium Level


 140 mmol/L


(136-145) 138 mmol/L


(136-145) 





 


Potassium Level


 5.9 mmol/L


(3.5-5.1) 4.5 mmol/L


(3.5-5.1) 





 


Chloride Level


 98 mmol/L


() 98 mmol/L


() 





 


Carbon Dioxide Level


 23 mmol/L


(21-32) 27 mmol/L


(21-32) 





 


Anion Gap 19 (6-14)  13 (6-14)  


 


Blood Urea Nitrogen


 57 mg/dL


(8-26) 46 mg/dL


(8-26) 





 


Creatinine


 9.5 mg/dL


(0.7-1.3) 7.3 mg/dL


(0.7-1.3) 





 


Estimated GFR


(Cockcroft-Gault) 6.6 


 9.0 


 





 


BUN/Creatinine Ratio 6 (6-20)   


 


Glucose Level


 67 mg/dL


(70-99) 116 mg/dL


(70-99) 





 


Calcium Level


 7.9 mg/dL


(8.5-10.1) 8.5 mg/dL


(8.5-10.1) 





 


Total Bilirubin


 0.9 mg/dL


(0.2-1.0) 


 





 


Aspartate Amino Transf


(AST/SGOT) 42 U/L (15-37) 


 


 





 


Alanine Aminotransferase


(ALT/SGPT) 45 U/L (16-63) 


 


 





 


Alkaline Phosphatase


 83 U/L


() 


 





 


Troponin I High Sensitivity 53 ng/L (4-75)   


 


NT-Pro-B-Type Natriuretic


Peptide > 19781 pg/mL


(0-124) 


 





 


Total Protein


 7.7 g/dL


(6.4-8.2) 


 





 


Albumin


 3.4 g/dL


(3.4-5.0) 


 





 


Albumin/Globulin Ratio 0.8 (1.0-1.7)   


 


Glucose (Fingerstick)


 


 


 155 mg/dL


(70-99)











Assessment and Plan


Assessmemt and Plan


Problems


Medical Problems:


(1) End stage renal disease


Status: Acute  





(2) Shortness of breath


Status: Acute  











Comment


Review of Relevant


I have reviewed the following items henry (where applicable) has been applied.


Medications:





Current Medications








 Medications


  (Trade)  Dose


 Ordered  Sig/Anna


 Route


 PRN Reason  Start Time


 Stop Time Status Last Admin


Dose Admin


 


 Atorvastatin


 Calcium


  (Lipitor)  10 mg  QHS


 PO


   5/27/22 21:00


    5/27/22 20:56





 


 Carvedilol


  (Coreg)  6.25 mg  BIDWMEALS


 PO


   5/27/22 17:00


    5/27/22 20:57





 


 Methocarbamol


  (Robaxin)  1,000 mg  Q8HRS


 PO


   5/27/22 16:00


    5/28/22 06:01





 


 Sevelamer


 Carbonate


  (Renvela)  800 mg  TIDWMEALS


 PO


   5/27/22 17:00


    5/28/22 09:02





 


 Heparin Sodium


  (Porcine)


  (Heparin Sodium)  5,000 unit  Q12HR


 SQ


   5/27/22 17:00


    5/28/22 09:05














Justifications for Admission


Other Justification


acute discitis











ELLIE MARQUEZ MD         May 28, 2022 11:24

## 2022-05-29 VITALS — DIASTOLIC BLOOD PRESSURE: 69 MMHG | SYSTOLIC BLOOD PRESSURE: 129 MMHG

## 2022-05-29 VITALS — SYSTOLIC BLOOD PRESSURE: 123 MMHG | DIASTOLIC BLOOD PRESSURE: 75 MMHG

## 2022-05-29 VITALS — SYSTOLIC BLOOD PRESSURE: 114 MMHG | DIASTOLIC BLOOD PRESSURE: 67 MMHG

## 2022-05-29 LAB
ANION GAP SERPL CALC-SCNC: 12 MMOL/L (ref 6–14)
BUN SERPL-MCNC: 34 MG/DL (ref 8–26)
CALCIUM SERPL-MCNC: 8 MG/DL (ref 8.5–10.1)
CHLORIDE SERPL-SCNC: 101 MMOL/L (ref 98–107)
CO2 SERPL-SCNC: 28 MMOL/L (ref 21–32)
CREAT SERPL-MCNC: 5.8 MG/DL (ref 0.7–1.3)
GFR SERPLBLD BASED ON 1.73 SQ M-ARVRAT: 11.7 ML/MIN
GLUCOSE SERPL-MCNC: 124 MG/DL (ref 70–99)
POTASSIUM SERPL-SCNC: 4.2 MMOL/L (ref 3.5–5.1)
SODIUM SERPL-SCNC: 141 MMOL/L (ref 136–145)

## 2022-05-29 RX ADMIN — SEVELAMER CARBONATE SCH MG: 800 TABLET, FILM COATED ORAL at 08:44

## 2022-05-29 RX ADMIN — SEVELAMER CARBONATE SCH MG: 800 TABLET, FILM COATED ORAL at 12:06

## 2022-05-29 RX ADMIN — HEPARIN SODIUM SCH UNIT: 5000 INJECTION, SOLUTION INTRAVENOUS; SUBCUTANEOUS at 08:53

## 2022-05-29 RX ADMIN — METHOCARBAMOL SCH MG: 500 TABLET ORAL at 06:08

## 2022-05-29 RX ADMIN — OXYMETAZOLINE HYDROCHLORIDE SCH SPRAY: 5 SPRAY NASAL at 08:53

## 2022-05-29 RX ADMIN — ASPIRIN SCH MG: 81 TABLET, COATED ORAL at 08:44

## 2022-05-29 RX ADMIN — CARVEDILOL SCH MG: 6.25 TABLET, FILM COATED ORAL at 08:45

## 2022-05-29 NOTE — PDOC
DATE OF SERVICE:


DOS:


DATE: 5/29/22 


TIME: 11:28





SUBJECTIVE


ROS


Follow-up for ESRD on hemodialysis Monday Wednesday Friday





Patient claims he is feeling great.  He thinks he is ready to go home.  He has 

arranged a ride for himself to get to dialysis





CVS:   no Orthopnea, no CP


RESP:   no SOB, no BARRIENTOS


GI:   no Nausea, no Vomiting


:   no Dysuria, no Urgency





OBJECTIVE


Vital Signs





Vital Signs








  Date Time  Temp Pulse Resp B/P (MAP) Pulse Ox O2 Delivery O2 Flow Rate FiO2


 


5/29/22 08:45  67  114/67    


 


5/29/22 07:00 98.3  18  92 Room Air  





 98.3       








I & 0











Intake and Output 


 


 5/29/22





 07:00


 


Intake Total 680 ml


 


Balance 680 ml


 


 


 


Intake Oral 680 ml











PHYSICAL EXAM


Physical Exam


General Appearance:    Awake      Alert Oriented x  3   In  no  Distress


Eyes:       VIsion Unchanged Conjunctiva Normal


EN:       Dry blood in nostrils, no active drainage  Mucous Memb.   moist


Neck:         no  JVD   min  JVP  Supple   no  Thyromegaly


CVS:       S1 S2   ?  Soft murmur  No Gallop  No Rub   chronic brawny 

edema/lymphedema


Resp:        no  Rales   no  Rhonchi   no  Acc. Muscle use


GI:       BS +ve    NO Bruit   Non Tender   Non Distended, morbidly obese


:        no  CVA tenderness;    no  Suprapubic Tenderness





DIAGNOSIS/ASSESSMENT


Assessment & Plan


ESRD :      Current fluid and E-lyte status does not necessitate emergent need 

for dialysis.  Will re-evaluate for dialysis in the am and continue on MWF 

schedule.





Anemia in the setting of ESRD: Some due to epistaxis cannot be ruled out.  Most 

recent available hemoglobin above 10, hence supplemental erythropoietin was not 

started





HTN:   Current BP meds reviewed.  See orders for changes.





Fluid overload at presentation: Cleared up on recheck chest x-ray





Hyperkalemia presentation presumably due to missed dialysis.  Now corrected





Okay to discharge from renal standpoint





COMMENT/RELEVANT DATA


Meds





Current Medications








 Medications


  (Trade)  Dose


 Ordered  Sig/Anna  Start Time


 Stop Time Status Last Admin


Dose Admin


 


 Acetaminophen


  (Tylenol)  650 mg  PRN Q6HRS  PRN  5/27/22 16:15


     





 


 Acetaminophen/


 Hydrocodone Bitart


  (Lortab 5/325)  1 tab  PRN Q4HRS  PRN  5/27/22 16:15


     





 


 Albumin Human  200 ml @ 


 200 mls/hr  1X PRN  PRN  5/28/22 09:45


 5/28/22 15:44 DC  





 


 Aspirin


  (Ecotrin)  81 mg  DAILY  5/28/22 09:00


    5/29/22 08:44


81 MG


 


 Atorvastatin


 Calcium


  (Lipitor)  10 mg  QHS  5/27/22 21:00


    5/28/22 20:28


10 MG


 


 Calcium Carbonate/


 Glycine


  (Tums)  500 mg  PRN Q3HRS  PRN  5/27/22 16:15


     





 


 Carvedilol


  (Coreg)  6.25 mg  BIDWMEALS  5/27/22 17:00


    5/29/22 08:45


6.25 MG


 


 Docusate Sodium


  (Colace)  100 mg  PRN BID  PRN  5/27/22 16:15


     





 


 Heparin Sodium


  (Porcine)


  (Heparin Sodium)  5,000 unit  Q12HR  5/27/22 17:00


    5/29/22 08:53


5,000 UNIT


 


 Hydralazine HCl


  (Apresoline Inj)  10 mg  PRN Q4HRS  PRN  5/27/22 16:15


     





 


 Info


  (PHARMACY


 MONITORING -- do


 not chart)  1 each  PRN DAILY  PRN  5/28/22 09:45


     





 


 Methocarbamol


  (Robaxin)  1,000 mg  Q8HRS  5/27/22 16:00


    5/29/22 06:08


1,000 MG


 


 Ondansetron HCl


  (Zofran)  4 mg  PRN Q6HRS  PRN  5/27/22 16:15


     





 


 Oxycodone/


 Acetaminophen


  (Percocet 10/325)  1 tab  PRN BID  PRN  5/27/22 16:15


     





 


 Oxymetazoline HCl


  (Afrin)  2 spray  BID  5/28/22 12:00


    5/29/22 08:53


2 SPRAY


 


 Sevelamer


 Carbonate


  (Renvela)  800 mg  TIDWMEALS  5/27/22 17:00


    5/29/22 08:44


800 MG


 


 Sodium Chloride  1,000 ml @ 


 400 mls/hr  Q2H30M PRN  5/28/22 09:45


 5/28/22 21:44 DC  





 


 Zolpidem Tartrate


  (Ambien)  5 mg  PRN QHS  PRN  5/27/22 16:15


     











Lab





Laboratory Tests








Test


 5/28/22


20:26 5/29/22


07:16 5/29/22


07:35 5/29/22


11:18


 


Glucose (Fingerstick)


 150 mg/dL


(70-99) 


 127 mg/dL


(70-99) 165 mg/dL


(70-99)


 


Sodium Level


 


 141 mmol/L


(136-145) 


 





 


Potassium Level


 


 4.2 mmol/L


(3.5-5.1) 


 





 


Chloride Level


 


 101 mmol/L


() 


 





 


Carbon Dioxide Level


 


 28 mmol/L


(21-32) 


 





 


Anion Gap  12 (6-14)   


 


Blood Urea Nitrogen


 


 34 mg/dL


(8-26) 


 





 


Creatinine


 


 5.8 mg/dL


(0.7-1.3) 


 





 


Estimated GFR


(Cockcroft-Gault) 


 11.7 


 


 





 


Glucose Level


 


 124 mg/dL


(70-99) 


 





 


Calcium Level


 


 8.0 mg/dL


(8.5-10.1) 


 











Results


All relevant outside records, renal labs, imaging studies, telemetry/EKG's were 

reviewed.


Other


Repeat chest x-ray post dialysis x2








IMPRESSION:


1. Prior bypass.


2. Mild cardiac enlargement.


3. Linear scarring in each lung, no confluent infiltrates.





Electronically signed by: Damion Perez MD (5/28/2022 3:14 PM) [id number]





Justicifation of Admission Dx:


Justifications for Admission:


Justification of Admission Dx:  N/A











MARIELA CRUZ MD               May 29, 2022 11:29

## 2022-05-29 NOTE — NUR
PATIENT LEAVES THE UNIT PER W/C AND ACCOMPANIED BY THIS WRITER, EMOTIONAL SUPPORT GIVEN, 
FOLLOW UP APPOINTMENTS ENCOURAGED.

## 2022-05-29 NOTE — NUR
DISCHARGE INSTRUCTIONS GIVEN, QUESTIONS AND CONCERNS ANSWERED, PATIENT VERBALIZED 
UNDERSTANDING OF DISCHARGE INFORMATION, PATIENT ENCOURAGED TO CONTINUE DIALYSIS AS PRIOR TO 
ADMISSION ON MON, WED, AND FRI.  ALL PERSONAL BELONGINGS GATHERED BY THE PATIENT AND PLACED 
IN BAGS FOR DISCHARGE.  [place] REMOVED FROM PATIENTS' RIGHT FOREARM PER STAFF, BANDAGE 
APPLIED.

## 2022-05-31 NOTE — EKG
[facility]

              [address] [place] [address]

Test Date:    2022               Test Time:    12:53:10

Pat Name:     RAFIQ MENDES             Department:   

Patient ID:   [id number]           Room:         [facility]

Gender:       M                        Technician:   

:          1950               Requested By: STEFANY MONTERO

Order Number: [id number]           Reading MD:   Leander Bernstein MD

                                 Measurements

Intervals                              Axis          

Rate:         79                       P:            28

AZ:           186                      QRS:          -18

QRSD:         100                      T:            144

QT:           424                                    

QTc:          487                                    

                           Interpretive Statements

SINUS RHYTHM

LAD

NON-SPECIFIC ST/T CHANGES

Electronically Signed On 2022 11:25:23 CDT by Leander Bernstein MD

## 2023-07-15 NOTE — EKG
DISPLAY PLAN FREE TEXT [facility]

              [address] [place] [address]

Test Date:    2021               Test Time:    17:25:32

Pat Name:     RAFIQ MENDES             Department:   

Patient ID:   [id number]           Room:          

Gender:       M                        Technician:   

:          1950               Requested By: POONAM ALEXANDER

Order Number: [id number]           Reading MD:   Leander Bernstein MD

                                 Measurements

Intervals                              Axis          

Rate:         85                       P:            41

HI:           172                      QRS:          -8

QRSD:         116                      T:            103

QT:           398                                    

QTc:          474                                    

                           Interpretive Statements

SINUS RHYTHM

NON-SPECIFIC ST/T CHANGES

Electronically Signed On 2021 13:56:22 CST by Leander Bernstein MD